# Patient Record
Sex: FEMALE | Race: WHITE | Employment: FULL TIME | URBAN - METROPOLITAN AREA
[De-identification: names, ages, dates, MRNs, and addresses within clinical notes are randomized per-mention and may not be internally consistent; named-entity substitution may affect disease eponyms.]

---

## 2019-08-04 ENCOUNTER — HOSPITAL ENCOUNTER (EMERGENCY)
Age: 34
Discharge: HOME | End: 2019-08-04
Attending: EMERGENCY MEDICINE
Payer: MEDICARE

## 2019-08-04 VITALS
WEIGHT: 200 LBS | TEMPERATURE: 98.3 F | BODY MASS INDEX: 37.76 KG/M2 | DIASTOLIC BLOOD PRESSURE: 92 MMHG | SYSTOLIC BLOOD PRESSURE: 146 MMHG | HEIGHT: 61 IN | HEART RATE: 100 BPM | RESPIRATION RATE: 16 BRPM | OXYGEN SATURATION: 96 %

## 2019-08-04 DIAGNOSIS — S91.309A OPEN WOUND OF FOOT EXCLUDING TOES: ICD-10-CM

## 2019-08-04 DIAGNOSIS — N18.9 CHRONIC KIDNEY DISEASE, UNSPECIFIED CKD STAGE: ICD-10-CM

## 2019-08-04 DIAGNOSIS — R60.9 PERIPHERAL EDEMA: Primary | ICD-10-CM

## 2019-08-04 DIAGNOSIS — R60.9 DEPENDENT EDEMA: ICD-10-CM

## 2019-08-04 LAB
A/G RATIO: 0.9 (ref 1.1–2.2)
ALBUMIN SERPL-MCNC: 3.5 G/DL (ref 3.4–5)
ALP BLD-CCNC: 94 U/L (ref 40–129)
ALT SERPL-CCNC: 34 U/L (ref 10–40)
ANION GAP SERPL CALCULATED.3IONS-SCNC: 11 MMOL/L (ref 3–16)
AST SERPL-CCNC: 38 U/L (ref 15–37)
BASOPHILS ABSOLUTE: 0 K/UL (ref 0–0.2)
BASOPHILS RELATIVE PERCENT: 0.4 %
BILIRUB SERPL-MCNC: <0.2 MG/DL (ref 0–1)
BUN BLDV-MCNC: 34 MG/DL (ref 7–20)
CALCIUM SERPL-MCNC: 8.7 MG/DL (ref 8.3–10.6)
CHLORIDE BLD-SCNC: 105 MMOL/L (ref 99–110)
CO2: 22 MMOL/L (ref 21–32)
CREAT SERPL-MCNC: 1.7 MG/DL (ref 0.6–1.1)
EOSINOPHILS ABSOLUTE: 0.3 K/UL (ref 0–0.6)
EOSINOPHILS RELATIVE PERCENT: 2.8 %
GFR AFRICAN AMERICAN: 41
GFR NON-AFRICAN AMERICAN: 34
GLOBULIN: 4.1 G/DL
GLUCOSE BLD-MCNC: 203 MG/DL (ref 70–99)
HCG QUALITATIVE: NEGATIVE
HCT VFR BLD CALC: 28.2 % (ref 36–48)
HEMOGLOBIN: 9.6 G/DL (ref 12–16)
LACTIC ACID, SEPSIS: 0.7 MMOL/L (ref 0.4–1.9)
LYMPHOCYTES ABSOLUTE: 2.6 K/UL (ref 1–5.1)
LYMPHOCYTES RELATIVE PERCENT: 27.3 %
MCH RBC QN AUTO: 28.8 PG (ref 26–34)
MCHC RBC AUTO-ENTMCNC: 34 G/DL (ref 31–36)
MCV RBC AUTO: 84.7 FL (ref 80–100)
MONOCYTES ABSOLUTE: 0.5 K/UL (ref 0–1.3)
MONOCYTES RELATIVE PERCENT: 5.2 %
NEUTROPHILS ABSOLUTE: 6.1 K/UL (ref 1.7–7.7)
NEUTROPHILS RELATIVE PERCENT: 64.3 %
PDW BLD-RTO: 13.3 % (ref 12.4–15.4)
PLATELET # BLD: 332 K/UL (ref 135–450)
PMV BLD AUTO: 7.1 FL (ref 5–10.5)
POTASSIUM SERPL-SCNC: 4.3 MMOL/L (ref 3.5–5.1)
RBC # BLD: 3.32 M/UL (ref 4–5.2)
SODIUM BLD-SCNC: 138 MMOL/L (ref 136–145)
TOTAL PROTEIN: 7.6 G/DL (ref 6.4–8.2)
WBC # BLD: 9.6 K/UL (ref 4–11)

## 2019-08-04 PROCEDURE — 80053 COMPREHEN METABOLIC PANEL: CPT

## 2019-08-04 PROCEDURE — 85025 COMPLETE CBC W/AUTO DIFF WBC: CPT

## 2019-08-04 PROCEDURE — 99283 EMERGENCY DEPT VISIT LOW MDM: CPT

## 2019-08-04 PROCEDURE — 6370000000 HC RX 637 (ALT 250 FOR IP): Performed by: PHYSICIAN ASSISTANT

## 2019-08-04 PROCEDURE — 84703 CHORIONIC GONADOTROPIN ASSAY: CPT

## 2019-08-04 PROCEDURE — 83605 ASSAY OF LACTIC ACID: CPT

## 2019-08-04 RX ORDER — SULFAMETHOXAZOLE AND TRIMETHOPRIM 800; 160 MG/1; MG/1
2 TABLET ORAL ONCE
Status: COMPLETED | OUTPATIENT
Start: 2019-08-04 | End: 2019-08-04

## 2019-08-04 RX ORDER — GABAPENTIN 300 MG/1
300 CAPSULE ORAL 3 TIMES DAILY PRN
Status: ON HOLD | COMMUNITY

## 2019-08-04 RX ORDER — CIPROFLOXACIN 500 MG/1
750 TABLET, FILM COATED ORAL ONCE
Status: COMPLETED | OUTPATIENT
Start: 2019-08-04 | End: 2019-08-04

## 2019-08-04 RX ORDER — CIPROFLOXACIN 750 MG/1
750 TABLET, FILM COATED ORAL 2 TIMES DAILY
Qty: 20 TABLET | Refills: 0 | Status: SHIPPED | OUTPATIENT
Start: 2019-08-04 | End: 2019-08-14

## 2019-08-04 RX ORDER — AMLODIPINE BESYLATE 10 MG/1
10 TABLET ORAL EVERY EVENING
Status: ON HOLD | COMMUNITY

## 2019-08-04 RX ORDER — SULFAMETHOXAZOLE AND TRIMETHOPRIM 800; 160 MG/1; MG/1
2 TABLET ORAL 2 TIMES DAILY
Qty: 40 TABLET | Refills: 0 | Status: SHIPPED | OUTPATIENT
Start: 2019-08-04 | End: 2019-08-14

## 2019-08-04 RX ORDER — M-VIT,TX,IRON,MINS/CALC/FOLIC 27MG-0.4MG
1 TABLET ORAL DAILY
Status: ON HOLD | COMMUNITY

## 2019-08-04 RX ORDER — CETIRIZINE HYDROCHLORIDE 10 MG/1
10 TABLET ORAL EVERY EVENING
Status: ON HOLD | COMMUNITY

## 2019-08-04 RX ADMIN — SULFAMETHOXAZOLE AND TRIMETHOPRIM 2 TABLET: 800; 160 TABLET ORAL at 21:27

## 2019-08-04 RX ADMIN — CIPROFLOXACIN HYDROCHLORIDE 750 MG: 500 TABLET, FILM COATED ORAL at 21:27

## 2019-08-04 ASSESSMENT — PAIN DESCRIPTION - LOCATION: LOCATION: LEG

## 2019-08-04 ASSESSMENT — PAIN DESCRIPTION - ORIENTATION: ORIENTATION: RIGHT;LEFT

## 2019-08-04 ASSESSMENT — ENCOUNTER SYMPTOMS
ABDOMINAL PAIN: 0
DIARRHEA: 0
SHORTNESS OF BREATH: 0
VOMITING: 0
BLOOD IN STOOL: 0
NAUSEA: 0

## 2019-08-04 ASSESSMENT — PAIN DESCRIPTION - PAIN TYPE: TYPE: ACUTE PAIN

## 2019-08-04 ASSESSMENT — PAIN SCALES - GENERAL: PAINLEVEL_OUTOF10: 6

## 2019-08-05 ENCOUNTER — HOSPITAL ENCOUNTER (OUTPATIENT)
Dept: VASCULAR LAB | Age: 34
Discharge: HOME | End: 2019-08-05
Payer: MEDICARE

## 2019-08-05 DIAGNOSIS — R60.9 PERIPHERAL EDEMA: ICD-10-CM

## 2019-08-05 PROCEDURE — 93970 EXTREMITY STUDY: CPT

## 2019-08-05 NOTE — ED PROVIDER NOTES
Exam reveals no gallop and no friction rub. No murmur heard. 2+ dorsal pedal pulse in bilateral feet. Musculoskeletal: Normal range of motion. She exhibits edema. She exhibits no deformity. Some scabs over the dorsal aspect of the left foot and the lateral aspect of the right ankle. Mild erythema around this but no streaking erythema, fluctuance or drainage noted. No vesicles. No palpable cords. Legs are edematous bilaterally but no pitting edema. Sensation light touch bilateral lower extremity's intact. Neurological: She is alert and oriented to person, place, and time. No cranial nerve deficit. Skin: Skin is warm and dry. No rash noted. She is not diaphoretic. No erythema. Psychiatric: She has a normal mood and affect. Her behavior is normal.   Nursing note and vitals reviewed.                   DIAGNOSTIC RESULTS   LABS:    Labs Reviewed   CBC WITH AUTO DIFFERENTIAL - Abnormal; Notable for the following components:       Result Value    RBC 3.32 (*)     Hemoglobin 9.6 (*)     Hematocrit 28.2 (*)     All other components within normal limits    Narrative:     Performed at:  [facility] [address], [place], [address] [employer]   Phone [phone]   COMPREHENSIVE METABOLIC PANEL - Abnormal; Notable for the following components:    Glucose 203 (*)     BUN 34 (*)     CREATININE 1.7 (*)     GFR Non- 34 (*)     GFR African American 41 (*)     Albumin/Globulin Ratio 0.9 (*)     AST 38 (*)     All other components within normal limits    Narrative:     Performed at:  [facility] [address], [place], [address] [employer]   Phone [phone]   LACTATE, SEPSIS    Narrative:     Performed at:  [facility] [address], [place], [address] [employer]   Phone [phone]   HCG, SERUM, QUALITATIVE    Narrative:     Performed at:  [facility] [address], [place], unremarkable. She denies any chest pain or shortness of breath. Low suspicion for acute congestive heart failure, pulmonary embolus. Nothing to suggest acute arterial occlusion, compartment syndrome, necrotizing fasciitis. Given that this wound did happen while she was staying in the edition she will be covered with Cipro and Bactrim for possible vibrio and bacteria from salt water. These do not need to have any renal adjustment as her creatinine clearance is over 50. She will follow-up with her family physician return here for any worsening of symptoms or problems at home. FINAL IMPRESSION      1. Peripheral edema    2. Open wound of foot excluding toes    3. Chronic kidney disease, unspecified CKD stage    4. Dependent edema          DISPOSITION/PLAN   DISPOSITION Decision To Discharge 08/04/2019 09:15:56 PM      PATIENT REFERREDTO:  No follow-up provider specified.     DISCHARGE MEDICATIONS:  Discharge Medication List as of 8/4/2019  9:35 PM      START taking these medications    Details   ciprofloxacin (CIPRO) 750 MG tablet Take 1 tablet by mouth 2 times daily for 10 days, Disp-20 tablet, R-0Print      sulfamethoxazole-trimethoprim (BACTRIM DS) 800-160 MG per tablet Take 2 tablets by mouth 2 times daily for 10 days, Disp-40 tablet, R-0Print             DISCONTINUED MEDICATIONS:  Discharge Medication List as of 8/4/2019  9:35 PM      STOP taking these medications       lisinopril (PRINIVIL;ZESTRIL) 10 MG tablet Comments:   Reason for Stopping:         insulin detemir (LEVEMIR) 100 UNIT/ML injection vial Comments:   Reason for Stopping:                      (Please note that portions ofthis note were completed with a voice recognition program.  Efforts were made to edit the dictations but occasionally words are mis-transcribed.)    Terrye Scheuermann, PA-C (electronically signed)           Terrye Scheuermann, PA-C  08/05/19 0229

## 2022-10-30 ENCOUNTER — HOSPITAL ENCOUNTER (INPATIENT)
Age: 37
LOS: 1 days | Discharge: LEFT BEFORE DISCHARGE | DRG: 369 | End: 2022-10-31
Attending: EMERGENCY MEDICINE | Admitting: STUDENT IN AN ORGANIZED HEALTH CARE EDUCATION/TRAINING PROGRAM
Payer: COMMERCIAL

## 2022-10-30 ENCOUNTER — APPOINTMENT (OUTPATIENT)
Dept: CT IMAGING | Age: 37
DRG: 369 | End: 2022-10-30
Payer: COMMERCIAL

## 2022-10-30 ENCOUNTER — APPOINTMENT (OUTPATIENT)
Dept: GENERAL RADIOLOGY | Age: 37
DRG: 369 | End: 2022-10-30
Payer: COMMERCIAL

## 2022-10-30 DIAGNOSIS — K92.0 HEMATEMESIS WITH NAUSEA: Primary | ICD-10-CM

## 2022-10-30 PROBLEM — E10.69 TYPE 1 DIABETES MELLITUS WITH OTHER SPECIFIED COMPLICATION (HCC): Status: ACTIVE | Noted: 2022-10-30

## 2022-10-30 PROBLEM — D64.9 NORMOCYTIC ANEMIA: Status: ACTIVE | Noted: 2022-10-30

## 2022-10-30 PROBLEM — N12 PYELONEPHRITIS: Status: ACTIVE | Noted: 2022-10-30

## 2022-10-30 LAB
ABSOLUTE EOS #: <0.03 K/UL (ref 0–0.44)
ABSOLUTE IMMATURE GRANULOCYTE: 0.04 K/UL (ref 0–0.3)
ABSOLUTE LYMPH #: 1.4 K/UL (ref 1.1–3.7)
ABSOLUTE MONO #: 0.22 K/UL (ref 0.1–1.2)
ALBUMIN SERPL-MCNC: 3.9 G/DL (ref 3.5–5.2)
ALBUMIN/GLOBULIN RATIO: 1 (ref 1–2.5)
ALP BLD-CCNC: 80 U/L (ref 35–104)
ALT SERPL-CCNC: 10 U/L (ref 5–33)
ANION GAP SERPL CALCULATED.3IONS-SCNC: 10 MMOL/L (ref 9–17)
AST SERPL-CCNC: 22 U/L
BASOPHILS # BLD: 1 % (ref 0–2)
BASOPHILS ABSOLUTE: 0.05 K/UL (ref 0–0.2)
BILIRUB SERPL-MCNC: 0.5 MG/DL (ref 0.3–1.2)
BUN BLDV-MCNC: 16 MG/DL (ref 6–20)
CALCIUM SERPL-MCNC: 8.9 MG/DL (ref 8.6–10.4)
CHLORIDE BLD-SCNC: 108 MMOL/L (ref 98–107)
CO2: 19 MMOL/L (ref 20–31)
CREAT SERPL-MCNC: 1.45 MG/DL (ref 0.5–0.9)
DIRECT EXAM: POSITIVE
EOSINOPHILS RELATIVE PERCENT: 0 % (ref 1–4)
GFR SERPL CREATININE-BSD FRML MDRD: 47 ML/MIN/1.73M2
GLUCOSE BLD-MCNC: 121 MG/DL (ref 65–105)
GLUCOSE BLD-MCNC: 211 MG/DL (ref 70–99)
GLUCOSE BLD-MCNC: 99 MG/DL (ref 65–105)
HCG QUALITATIVE: NEGATIVE
HCT VFR BLD CALC: 31.7 % (ref 36.3–47.1)
HEMOGLOBIN: 10.5 G/DL (ref 11.9–15.1)
IMMATURE GRANULOCYTES: 0 %
LIPASE: 34 U/L (ref 13–60)
LYMPHOCYTES # BLD: 13 % (ref 24–43)
Lab: ABNORMAL
MAGNESIUM: 1.9 MG/DL (ref 1.6–2.6)
MCH RBC QN AUTO: 27.6 PG (ref 25.2–33.5)
MCHC RBC AUTO-ENTMCNC: 33.1 G/DL (ref 28.4–34.8)
MCV RBC AUTO: 83.2 FL (ref 82.6–102.9)
MONOCYTES # BLD: 2 % (ref 3–12)
NRBC AUTOMATED: 0 PER 100 WBC
PDW BLD-RTO: 13.3 % (ref 11.8–14.4)
PLATELET # BLD: 376 K/UL (ref 138–453)
PMV BLD AUTO: 9.3 FL (ref 8.1–13.5)
POTASSIUM SERPL-SCNC: 4.4 MMOL/L (ref 3.7–5.3)
RBC # BLD: 3.81 M/UL (ref 3.95–5.11)
SEG NEUTROPHILS: 84 % (ref 36–65)
SEGMENTED NEUTROPHILS ABSOLUTE COUNT: 8.9 K/UL (ref 1.5–8.1)
SODIUM BLD-SCNC: 137 MMOL/L (ref 135–144)
SPECIMEN DESCRIPTION: ABNORMAL
TOTAL PROTEIN: 7.8 G/DL (ref 6.4–8.3)
WBC # BLD: 10.6 K/UL (ref 3.5–11.3)

## 2022-10-30 PROCEDURE — 2060000000 HC ICU INTERMEDIATE R&B

## 2022-10-30 PROCEDURE — 84703 CHORIONIC GONADOTROPIN ASSAY: CPT

## 2022-10-30 PROCEDURE — 83690 ASSAY OF LIPASE: CPT

## 2022-10-30 PROCEDURE — 6360000002 HC RX W HCPCS: Performed by: STUDENT IN AN ORGANIZED HEALTH CARE EDUCATION/TRAINING PROGRAM

## 2022-10-30 PROCEDURE — C9113 INJ PANTOPRAZOLE SODIUM, VIA: HCPCS | Performed by: STUDENT IN AN ORGANIZED HEALTH CARE EDUCATION/TRAINING PROGRAM

## 2022-10-30 PROCEDURE — 93005 ELECTROCARDIOGRAM TRACING: CPT

## 2022-10-30 PROCEDURE — 99285 EMERGENCY DEPT VISIT HI MDM: CPT

## 2022-10-30 PROCEDURE — 99223 1ST HOSP IP/OBS HIGH 75: CPT | Performed by: STUDENT IN AN ORGANIZED HEALTH CARE EDUCATION/TRAINING PROGRAM

## 2022-10-30 PROCEDURE — 85025 COMPLETE CBC W/AUTO DIFF WBC: CPT

## 2022-10-30 PROCEDURE — 6360000002 HC RX W HCPCS

## 2022-10-30 PROCEDURE — 96374 THER/PROPH/DIAG INJ IV PUSH: CPT

## 2022-10-30 PROCEDURE — 82947 ASSAY GLUCOSE BLOOD QUANT: CPT

## 2022-10-30 PROCEDURE — 80053 COMPREHEN METABOLIC PANEL: CPT

## 2022-10-30 PROCEDURE — 96375 TX/PRO/DX INJ NEW DRUG ADDON: CPT

## 2022-10-30 PROCEDURE — 36415 COLL VENOUS BLD VENIPUNCTURE: CPT

## 2022-10-30 PROCEDURE — 96376 TX/PRO/DX INJ SAME DRUG ADON: CPT

## 2022-10-30 PROCEDURE — 2580000003 HC RX 258: Performed by: STUDENT IN AN ORGANIZED HEALTH CARE EDUCATION/TRAINING PROGRAM

## 2022-10-30 PROCEDURE — 82271 OCCULT BLOOD OTHER SOURCES: CPT

## 2022-10-30 PROCEDURE — 71045 X-RAY EXAM CHEST 1 VIEW: CPT

## 2022-10-30 PROCEDURE — 87040 BLOOD CULTURE FOR BACTERIA: CPT

## 2022-10-30 PROCEDURE — 2500000003 HC RX 250 WO HCPCS

## 2022-10-30 PROCEDURE — 83735 ASSAY OF MAGNESIUM: CPT

## 2022-10-30 PROCEDURE — 2580000003 HC RX 258

## 2022-10-30 PROCEDURE — 74176 CT ABD & PELVIS W/O CONTRAST: CPT

## 2022-10-30 PROCEDURE — 96372 THER/PROPH/DIAG INJ SC/IM: CPT

## 2022-10-30 RX ORDER — ACETAMINOPHEN 325 MG/1
650 TABLET ORAL EVERY 6 HOURS PRN
Status: DISCONTINUED | OUTPATIENT
Start: 2022-10-30 | End: 2022-10-31 | Stop reason: HOSPADM

## 2022-10-30 RX ORDER — POTASSIUM CHLORIDE 7.45 MG/ML
10 INJECTION INTRAVENOUS PRN
Status: DISCONTINUED | OUTPATIENT
Start: 2022-10-30 | End: 2022-10-31 | Stop reason: HOSPADM

## 2022-10-30 RX ORDER — METOCLOPRAMIDE HYDROCHLORIDE 5 MG/ML
10 INJECTION INTRAMUSCULAR; INTRAVENOUS EVERY 6 HOURS PRN
Status: DISCONTINUED | OUTPATIENT
Start: 2022-10-30 | End: 2022-10-31 | Stop reason: HOSPADM

## 2022-10-30 RX ORDER — METOPROLOL TARTRATE 5 MG/5ML
5 INJECTION INTRAVENOUS EVERY 6 HOURS PRN
Status: DISCONTINUED | OUTPATIENT
Start: 2022-10-30 | End: 2022-10-30

## 2022-10-30 RX ORDER — LORAZEPAM 2 MG/ML
1 INJECTION INTRAMUSCULAR ONCE
Status: COMPLETED | OUTPATIENT
Start: 2022-10-30 | End: 2022-10-30

## 2022-10-30 RX ORDER — POLYETHYLENE GLYCOL 3350 17 G/17G
17 POWDER, FOR SOLUTION ORAL DAILY PRN
Status: DISCONTINUED | OUTPATIENT
Start: 2022-10-30 | End: 2022-10-31 | Stop reason: HOSPADM

## 2022-10-30 RX ORDER — ONDANSETRON 2 MG/ML
4 INJECTION INTRAMUSCULAR; INTRAVENOUS ONCE
Status: COMPLETED | OUTPATIENT
Start: 2022-10-30 | End: 2022-10-30

## 2022-10-30 RX ORDER — ONDANSETRON 2 MG/ML
4 INJECTION INTRAMUSCULAR; INTRAVENOUS EVERY 6 HOURS PRN
Status: DISCONTINUED | OUTPATIENT
Start: 2022-10-30 | End: 2022-10-31 | Stop reason: HOSPADM

## 2022-10-30 RX ORDER — INSULIN LISPRO 100 [IU]/ML
0-4 INJECTION, SOLUTION INTRAVENOUS; SUBCUTANEOUS NIGHTLY
Status: DISCONTINUED | OUTPATIENT
Start: 2022-10-30 | End: 2022-10-31 | Stop reason: HOSPADM

## 2022-10-30 RX ORDER — POTASSIUM CHLORIDE 20 MEQ/1
40 TABLET, EXTENDED RELEASE ORAL PRN
Status: DISCONTINUED | OUTPATIENT
Start: 2022-10-30 | End: 2022-10-31 | Stop reason: HOSPADM

## 2022-10-30 RX ORDER — FENTANYL CITRATE 50 UG/ML
50 INJECTION, SOLUTION INTRAMUSCULAR; INTRAVENOUS ONCE
Status: COMPLETED | OUTPATIENT
Start: 2022-10-30 | End: 2022-10-30

## 2022-10-30 RX ORDER — ONDANSETRON 4 MG/1
4 TABLET, ORALLY DISINTEGRATING ORAL EVERY 8 HOURS PRN
Status: DISCONTINUED | OUTPATIENT
Start: 2022-10-30 | End: 2022-10-31 | Stop reason: HOSPADM

## 2022-10-30 RX ORDER — INSULIN LISPRO 100 [IU]/ML
0-8 INJECTION, SOLUTION INTRAVENOUS; SUBCUTANEOUS
Status: DISCONTINUED | OUTPATIENT
Start: 2022-10-30 | End: 2022-10-31 | Stop reason: HOSPADM

## 2022-10-30 RX ORDER — SODIUM CHLORIDE 0.9 % (FLUSH) 0.9 %
5-40 SYRINGE (ML) INJECTION PRN
Status: DISCONTINUED | OUTPATIENT
Start: 2022-10-30 | End: 2022-10-31 | Stop reason: HOSPADM

## 2022-10-30 RX ORDER — SODIUM CHLORIDE 9 MG/ML
INJECTION, SOLUTION INTRAVENOUS PRN
Status: DISCONTINUED | OUTPATIENT
Start: 2022-10-30 | End: 2022-10-31 | Stop reason: HOSPADM

## 2022-10-30 RX ORDER — PROMETHAZINE HYDROCHLORIDE 25 MG/ML
25 INJECTION, SOLUTION INTRAMUSCULAR; INTRAVENOUS EVERY 6 HOURS PRN
Status: DISCONTINUED | OUTPATIENT
Start: 2022-10-30 | End: 2022-10-31 | Stop reason: HOSPADM

## 2022-10-30 RX ORDER — SODIUM CHLORIDE 9 MG/ML
INJECTION, SOLUTION INTRAVENOUS CONTINUOUS
Status: DISCONTINUED | OUTPATIENT
Start: 2022-10-30 | End: 2022-10-31

## 2022-10-30 RX ORDER — 0.9 % SODIUM CHLORIDE 0.9 %
1000 INTRAVENOUS SOLUTION INTRAVENOUS ONCE
Status: COMPLETED | OUTPATIENT
Start: 2022-10-30 | End: 2022-10-30

## 2022-10-30 RX ORDER — LORAZEPAM 2 MG/ML
1 INJECTION INTRAMUSCULAR EVERY 6 HOURS PRN
Status: DISCONTINUED | OUTPATIENT
Start: 2022-10-30 | End: 2022-10-31 | Stop reason: HOSPADM

## 2022-10-30 RX ORDER — ACETAMINOPHEN 650 MG/1
650 SUPPOSITORY RECTAL EVERY 6 HOURS PRN
Status: DISCONTINUED | OUTPATIENT
Start: 2022-10-30 | End: 2022-10-31 | Stop reason: HOSPADM

## 2022-10-30 RX ORDER — MAGNESIUM SULFATE IN WATER 40 MG/ML
2000 INJECTION, SOLUTION INTRAVENOUS PRN
Status: DISCONTINUED | OUTPATIENT
Start: 2022-10-30 | End: 2022-10-31 | Stop reason: HOSPADM

## 2022-10-30 RX ORDER — DEXTROSE MONOHYDRATE 100 MG/ML
INJECTION, SOLUTION INTRAVENOUS CONTINUOUS PRN
Status: DISCONTINUED | OUTPATIENT
Start: 2022-10-30 | End: 2022-10-31 | Stop reason: HOSPADM

## 2022-10-30 RX ORDER — PROCHLORPERAZINE EDISYLATE 5 MG/ML
10 INJECTION INTRAMUSCULAR; INTRAVENOUS EVERY 6 HOURS PRN
Status: DISCONTINUED | OUTPATIENT
Start: 2022-10-30 | End: 2022-10-31 | Stop reason: HOSPADM

## 2022-10-30 RX ORDER — LABETALOL HYDROCHLORIDE 5 MG/ML
10 INJECTION, SOLUTION INTRAVENOUS EVERY 4 HOURS PRN
Status: DISCONTINUED | OUTPATIENT
Start: 2022-10-30 | End: 2022-10-31 | Stop reason: HOSPADM

## 2022-10-30 RX ORDER — PROMETHAZINE HYDROCHLORIDE 25 MG/ML
25 INJECTION, SOLUTION INTRAMUSCULAR; INTRAVENOUS ONCE
Status: COMPLETED | OUTPATIENT
Start: 2022-10-30 | End: 2022-10-30

## 2022-10-30 RX ORDER — SODIUM CHLORIDE 0.9 % (FLUSH) 0.9 %
5-40 SYRINGE (ML) INJECTION EVERY 12 HOURS SCHEDULED
Status: DISCONTINUED | OUTPATIENT
Start: 2022-10-30 | End: 2022-10-31 | Stop reason: HOSPADM

## 2022-10-30 RX ADMIN — SODIUM CHLORIDE 80 MG: 9 INJECTION, SOLUTION INTRAVENOUS at 15:23

## 2022-10-30 RX ADMIN — METOCLOPRAMIDE 10 MG: 5 INJECTION, SOLUTION INTRAMUSCULAR; INTRAVENOUS at 20:55

## 2022-10-30 RX ADMIN — SODIUM CHLORIDE: 9 INJECTION, SOLUTION INTRAVENOUS at 16:59

## 2022-10-30 RX ADMIN — SODIUM CHLORIDE, PRESERVATIVE FREE 40 MG: 5 INJECTION INTRAVENOUS at 20:55

## 2022-10-30 RX ADMIN — CEFTRIAXONE SODIUM 1000 MG: 10 INJECTION, POWDER, FOR SOLUTION INTRAVENOUS at 21:24

## 2022-10-30 RX ADMIN — ONDANSETRON 4 MG: 2 INJECTION INTRAMUSCULAR; INTRAVENOUS at 20:10

## 2022-10-30 RX ADMIN — PROMETHAZINE HYDROCHLORIDE 25 MG: 25 INJECTION INTRAMUSCULAR; INTRAVENOUS at 14:17

## 2022-10-30 RX ADMIN — FENTANYL CITRATE 50 MCG: 50 INJECTION, SOLUTION INTRAMUSCULAR; INTRAVENOUS at 14:21

## 2022-10-30 RX ADMIN — ONDANSETRON 4 MG: 2 INJECTION INTRAMUSCULAR; INTRAVENOUS at 13:19

## 2022-10-30 RX ADMIN — SODIUM CHLORIDE 1000 ML: 9 INJECTION, SOLUTION INTRAVENOUS at 13:19

## 2022-10-30 RX ADMIN — LORAZEPAM 1 MG: 2 INJECTION INTRAMUSCULAR; INTRAVENOUS at 17:24

## 2022-10-30 RX ADMIN — ONDANSETRON 4 MG: 2 INJECTION INTRAMUSCULAR; INTRAVENOUS at 14:47

## 2022-10-30 ASSESSMENT — ENCOUNTER SYMPTOMS
EYE REDNESS: 0
EYES NEGATIVE: 1
CHOKING: 0
SHORTNESS OF BREATH: 0
CHEST TIGHTNESS: 0
RHINORRHEA: 0
VOMITING: 1
ALLERGIC/IMMUNOLOGIC NEGATIVE: 1
COUGH: 0
DIARRHEA: 1
NAUSEA: 1
ABDOMINAL PAIN: 1

## 2022-10-30 ASSESSMENT — PAIN - FUNCTIONAL ASSESSMENT: PAIN_FUNCTIONAL_ASSESSMENT: 0-10

## 2022-10-30 ASSESSMENT — PAIN DESCRIPTION - LOCATION
LOCATION: FLANK;ABDOMEN
LOCATION: ABDOMEN;FLANK

## 2022-10-30 ASSESSMENT — PAIN SCALES - GENERAL
PAINLEVEL_OUTOF10: 10

## 2022-10-30 ASSESSMENT — PAIN DESCRIPTION - ORIENTATION
ORIENTATION: RIGHT;LEFT
ORIENTATION: RIGHT;LEFT

## 2022-10-30 NOTE — PLAN OF CARE
Problem: Discharge Planning  Goal: Discharge to home or other facility with appropriate resources  Outcome: Progressing  Flowsheets (Taken 10/30/2022 1700)  Discharge to home or other facility with appropriate resources:   Identify barriers to discharge with patient and caregiver   Arrange for needed discharge resources and transportation as appropriate   Identify discharge learning needs (meds, wound care, etc)   Refer to discharge planning if patient needs post-hospital services based on physician order or complex needs related to functional status, cognitive ability or social support system     Problem: Pain  Goal: Verbalizes/displays adequate comfort level or baseline comfort level  Outcome: Progressing     Problem: Safety - Adult  Goal: Free from fall injury  Outcome: Progressing

## 2022-10-30 NOTE — ED PROVIDER NOTES
[facility]     Emergency Department     Faculty Attestation    I performed a history and physical examination of the patient and discussed management with the resident. I have reviewed and agree with the residents findings including all diagnostic interpretations, and treatment plans as written at the time of my review. Any areas of disagreement are noted on the chart. I was personally present for the key portions of any procedures. I have documented in the chart those procedures where I was not present during the key portions. For Physician Assistant/ Nurse Practitioner cases/documentation I have personally evaluated this patient and have completed at least one if not all key elements of the E/M (history, physical exam, and MDM). Additional findings are as noted. Primary Care Physician: No primary care provider on file. History: This is a 44 y.o. female who presents to the Emergency Department with complaint of nausea vomiting diarrhea. Began today. Patient states she has history of stage III chronic kidney disease    Physical:   height is 5' 1\" (1.549 m) and weight is 200 lb (90.7 kg). Her blood pressure is 150/133 (abnormal) and her pulse is 88. Her respiration is 24 and oxygen saturation is 100%. Patient is vomiting and having diarrhea in the emergency department, heart regular rate, patient has no significant respiratory distress noted. Impression: Nausea vomiting diarrhea    Plan: IV fluid, antiemetic, CBC, CMP, lipase, urinalysis, hCG and reassess    Based on my initial history, physical exam and review of the past medical history, my initial differential diagnosis includes the following: Electrlyte abnormalities, GI Bleed, UTI    During the Emergency Department course the patient had the following responses to our interventions: Minimal improvement on vomiting and diarrhea.      Review of radiological and laboratory results (and input from consultants) now identifies the following problems listed here: elevated glucose, elevated creatinine, baseline anemia, wit positive gastric cult blood in emesis    After reviewing the Emergency Department evaluation, clinical response and results of diagnostic studies, I believe the most probable diagnosis/es is/are: GI Bleed  In the previous differential diagnosis possibilities are now highly unlikely, including pancreatitis , for the following reasons: normal lipase    After reviewing the diagnosis and (risk, benefits and alternatives) treatment options with the patient, we agreed the following plan: Admission      (Please note that portions of this note were completed with a voice recognition program.  Efforts were made to edit the dictations but occasionally words are mis-transcribed.)    Bello Smith.  Agustin Saenz MD, [address] [facility]  Attending Emergency Medicine Physician         Katherine Baker MD  10/30/22 [address] [facility]. Agustin Saenz MD  10/30/22 0653

## 2022-10-30 NOTE — ED PROVIDER NOTES
[facility]  ED  Emergency Department Encounter  Emergency Medicine Resident     Pt Name: Kathia Delgado  MRN: [id number]  [place] 1985  Date of evaluation: 10/30/22  PCP:  No primary care provider on file. CHIEF COMPLAINT       Chief Complaint   Patient presents with    Emesis     Dark brown in color, constant since this AM    Diarrhea     HISTORY OFPRESENT ILLNESS  (Location/Symptom, Timing/Onset, Context/Setting, Quality, Duration, Modifying Factors,Severity.)      Kathia Delgado is a 44 y.o. female who presents with persistent nausea, vomiting and diarrhea since this morning. Also endorsing abdominal pain. Patient is a stage III kidney patient, not on dialysis being followed in [place]. Patient is concerned as she has had bloody emesis in the past.     Patient denies any fevers, headaches, vision changes, chest pain, shortness of breath, cough, congestion, runny nose, and dysuria. PAST MEDICAL / SURGICAL / SOCIAL / FAMILY HISTORY      has a past medical history of Diabetes mellitus ([facility] [address] and Hypertension. has a past surgical history that includes Tubal ligation (11/2018).     Social History     Socioeconomic History    Marital status: Single     Spouse name: Not on file    Number of children: Not on file    Years of education: Not on file    Highest education level: Not on file   Occupational History    Not on file   Tobacco Use    Smoking status: Never    Smokeless tobacco: Never   Substance and Sexual Activity    Alcohol use: No    Drug use: Never    Sexual activity: Not on file   Other Topics Concern    Not on file   Social History Narrative    Not on file     Social Determinants of Health     Financial Resource Strain: Not on file   Food Insecurity: Not on file   Transportation Needs: Not on file   Physical Activity: Not on file   Stress: Not on file   Social Connections: Not on file   Intimate Partner Violence: Not on file   Housing Stability: Not on file     History reviewed. No pertinent family history. Allergies:  Cinnamon and Morphine    Home Medications:  Prior to Admission medications    Medication Sig Start Date End Date Taking? Authorizing Provider   LOSARTAN POTASSIUM PO Take by mouth    Historical Provider, MD   amLODIPine (NORVASC) 10 MG tablet Take 10 mg by mouth every evening    Edward Provider, MD   gabapentin (NEURONTIN) 300 MG capsule Take 300 mg by mouth 3 times daily. Historical Provider, MD   ATORVASTATIN CALCIUM PO Take by mouth every evening    Edward Provider, MD   cetirizine (ZYRTEC) 10 MG tablet Take 10 mg by mouth every evening    Historical Provider, MD   vitamin D (CHOLECALCIFEROL) 1000 UNIT TABS tablet Take 1,000 Units by mouth once a week Indications: on sundays    Historical Provider, MD   Multiple Vitamins-Minerals (THERAPEUTIC MULTIVITAMIN-MINERALS) tablet Take 1 tablet by mouth daily    Historical Provider, MD   ondansetron (ZOFRAN ODT) 4 MG disintegrating tablet Take 1 tablet by mouth every 8 hours as needed for Nausea or Vomiting 1/26/17   Niesha Navarro MD   AMITRIPTYLINE HCL PO Take by mouth    Historical Provider, MD   Insulin Infusion Pump BERTA by Does not apply route    Historical Provider, MD   insulin aspart (NOVOLOG) 100 UNIT/ML injection vial Inject into the skin 3 times daily (before meals) Sliding scale    Historical Provider, MD       REVIEW OF SYSTEMS    (2-9 systems for level 4, 10 or more for level 5)      Review of Systems   Constitutional:  Negative for fever. HENT:  Negative for congestion and rhinorrhea. Eyes:  Negative for redness. Respiratory:  Negative for shortness of breath. Cardiovascular:  Negative for chest pain. Gastrointestinal:  Positive for abdominal pain, diarrhea, nausea and vomiting. Genitourinary:  Negative for dysuria. Musculoskeletal:  Negative for joint swelling. Skin:  Negative for wound. Neurological:  Negative for headaches.      PHYSICAL EXAM   (up to 7 for level 4, 8 or more for level 5)     INITIAL VITALS:    height is 5' 1\" (1.549 m) and weight is 200 lb (90.7 kg). Her axillary temperature is 97.5 °F (36.4 °C). Her blood pressure is 150/133 (abnormal) and her pulse is 88. Her respiration is 24 and oxygen saturation is 100%. Physical Exam  Constitutional:       General: She is in acute distress. Appearance: She is ill-appearing. She is not diaphoretic. HENT:      Head: Normocephalic. Mouth/Throat:      Mouth: Mucous membranes are dry. Eyes:      Extraocular Movements: Extraocular movements intact. Pupils: Pupils are equal, round, and reactive to light. Cardiovascular:      Rate and Rhythm: Normal rate and regular rhythm. Pulses: Normal pulses. Heart sounds: Normal heart sounds. No murmur heard. Pulmonary:      Effort: Pulmonary effort is normal. No respiratory distress. Breath sounds: Normal breath sounds. No wheezing. Abdominal:      General: There is no distension. Palpations: Abdomen is soft. Tenderness: There is abdominal tenderness (diffuse/generalized). There is no guarding or rebound. Musculoskeletal:         General: Normal range of motion. Cervical back: Normal range of motion. No tenderness. Right lower leg: No edema. Left lower leg: No edema. Lymphadenopathy:      Cervical: No cervical adenopathy. Skin:     General: Skin is warm. Neurological:      Mental Status: She is oriented to person, place, and time. Cranial Nerves: No cranial nerve deficit. Motor: No weakness.      DIFFERENTIAL  DIAGNOSIS     PLAN (LABS / IMAGING / EKG):  Orders Placed This Encounter   Procedures    XR CHEST PORTABLE    CBC with Auto Differential    CMP    Lipase    Magnesium    Urinalysis with Reflex to Culture    OCCULT BLOOD SCREEN    HCG Qualitative, Serum    OCCULT BLOOD GASTRIC / DUODENUM    Occult blood gastric / duodenum    Telemetry monitoring - 72 hour duration    Inpatient consult to GI    Inpatient consult to Internal Medicine    ADMIT TO INPATIENT    ADMIT TO INPATIENT     MEDICATIONS ORDERED:  Orders Placed This Encounter   Medications    ondansetron (ZOFRAN) injection 4 mg    0.9 % sodium chloride bolus    promethazine (PHENERGAN) injection 25 mg    fentaNYL (SUBLIMAZE) injection 50 mcg    pantoprazole (PROTONIX) 80 mg in sodium chloride 0.9 % 50 mL bolus    DISCONTD: pantoprazole (PROTONIX) 80 mg in sodium chloride 0.9 % 100 mL infusion    ondansetron (ZOFRAN) injection 4 mg    0.9 % sodium chloride infusion     DDX: GI bleed, gastroenteritis    Initial MDM/Plan: 44 y.o. female who presents with vomiting, diarrhea since this morning. Concerned for blood in vomit due to brown discoloration. Will check for occult blood in vomit and stool. Also check labs for electrolyte abnormality, blood loss anemia. Give IVF, zofran and reassess. DIAGNOSTIC RESULTS / EMERGENCY DEPARTMENT COURSE / MDM     LABS:  Labs Reviewed   CBC WITH AUTO DIFFERENTIAL - Abnormal; Notable for the following components:       Result Value    RBC 3.81 (*)     Hemoglobin 10.5 (*)     Hematocrit 31.7 (*)     Seg Neutrophils 84 (*)     Lymphocytes 13 (*)     Monocytes 2 (*)     Eosinophils % 0 (*)     Segs Absolute 8.90 (*)     All other components within normal limits   COMPREHENSIVE METABOLIC PANEL - Abnormal; Notable for the following components:    Glucose 211 (*)     Creatinine 1.45 (*)     Est, Glom Filt Rate 47 (*)     Chloride 108 (*)     CO2 19 (*)     All other components within normal limits   OCCULT BLOOD GASTRIC / DUODENUM - Abnormal; Notable for the following components:    Direct Exam POSITIVE (*)     All other components within normal limits   LIPASE   MAGNESIUM   HCG, SERUM, QUALITATIVE   URINALYSIS WITH REFLEX TO CULTURE   OCCULT BLOOD SCREEN   OCCULT BLOOD GASTRIC / DUODENUM       RADIOLOGY:  XR CHEST PORTABLE    Result Date: 10/30/2022  EXAMINATION: ONE XRAY VIEW OF THE CHEST 10/30/2022 2:51 pm COMPARISON: None. HISTORY: ORDERING SYSTEM PROVIDED HISTORY: vomiting blood TECHNOLOGIST PROVIDED HISTORY: vomiting blood FINDINGS: The cardiac silhouette appears within normal limits. No confluent airspace opacity or pleural effusion is seen. No evidence of intra-free air is seen beneath the diaphragm. No evidence of acute pulmonary abnormality seen. No radiographic evidence of intra-free air is seen beneath the diaphragm. EKG  EKG obtained in triage, prior to physician evaluation and orders. EKG did not function in decision making. R[place]y Coma Scale  Eye Opening: Spontaneous  Best Verbal Response: Oriented  Best Motor Response: Obeys commands  [place]y Coma Scale Score: 15    EMERGENCY DEPARTMENT COURSE:  ED Course as of 10/30/22 1638   Sun Oct 30, 2022   1418 Creatinine(!): 1.45  Previously  2.04 on 7/22/22 [CP]   1418 hCG Qual: NEGATIVE  Hcg negative [CP]   1419 Magnesium: 1.9  Mg normal [CP]   0891 Spoke with Carine from GI. Hematemesis is likely secondary to diabetes. Recommended PPO and check hemoglobin A1c [CP]   1456 Spoke with IM team, who will admit the patient [CP]   [phone] with Carli's mom Jaylen with permission ([phone]). 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 \"  [CP]      ED Course User Index  [CP] Cesar Gonzalez MD     PROCEDURES:  None    CONSULTS:  IP CONSULT TO GI  IP CONSULT TO INTERNAL MEDICINE    CRITICAL CARE:  There was significant risk of life threatening deterioration of patient's condition requiring my direct management. Critical care time 5 minutes, excluding any documented procedures. FINAL IMPRESSION      1. Hematemesis with nausea        DISPOSITION / PLAN     DISPOSITION Admitted 10/30/2022 04:00:21 PM    PATIENTREFERRED TO:  No follow-up provider specified.     DISCHARGE MEDICATIONS:  New Prescriptions    No medications on file       Libia Orozco MD  Emergency Medicine Resident    (Please note that portions of this note were completed with a voice recognition program.  Efforts were made to edit the dictations but occasionally words are mis-transcribed.)        Anika Campo MD  Resident  10/30/22 4175       Anika Campo MD  Resident  10/30/22 0318

## 2022-10-30 NOTE — ED NOTES
Writer called Pharmacy regarding Phenergan not being sent. Pharmacy stated they would send medication to ED.       Juanjose Benietz RN  10/30/22 4373

## 2022-10-30 NOTE — ED NOTES
Pt arrived to ED alert and oriented x4 via EMS. Pt c/o abdominal and flank pain with n/v/d. Pt reports symptoms began this AM, states she has been having diarrhea and vomiting \"all day\". Pt c/o diffuse abdominal pain, actively vomiting on arrival.  Pt states \"my kidneys hurt\", has a hx of kidney failure and is on a list for a kidney transplant. Pt denies having been around anyone suspected to have COVID-19 or anyone that has been sick, denies recent travel outside the [facility] or [address]. Pt placed on continuous pulse ox and BP cuff. RR rapid and labored. Will continue plan of care.      Love Johnson RN  10/30/22 6216

## 2022-10-30 NOTE — PROGRESS NOTES
Patient given ativan x1 with good relief of nausea. Patient down for CT abdomen and pelvis. Writer also notifies residents for orders for blood sugars and also that patient's BP remains elevated. New orders pending.

## 2022-10-30 NOTE — ED NOTES
Writer Perfect Served admitting service regarding orders and pt still being in pain and vomiting. Waiting on response.       Erin Narvaez RN  10/30/22 5244

## 2022-10-30 NOTE — ED NOTES
Labeled blood specimens sent to lab via tube system.     [x] Lavender   [] on ice   [x] Blue   [x] Green/yellow  [x] Green/black [] on ice  [] Pink  [x] Red  [] Yellow  [] Blood Cultures      Amina Yoder RN  10/30/22 1885

## 2022-10-31 VITALS
TEMPERATURE: 99.4 F | HEART RATE: 95 BPM | BODY MASS INDEX: 37.76 KG/M2 | WEIGHT: 200 LBS | RESPIRATION RATE: 18 BRPM | OXYGEN SATURATION: 100 % | HEIGHT: 61 IN | DIASTOLIC BLOOD PRESSURE: 98 MMHG | SYSTOLIC BLOOD PRESSURE: 168 MMHG

## 2022-10-31 PROBLEM — R80.9 PROTEINURIA DUE TO TYPE 1 DIABETES MELLITUS (HCC): Status: ACTIVE | Noted: 2022-10-31

## 2022-10-31 PROBLEM — D63.8 ANEMIA OF CHRONIC DISEASE: Status: ACTIVE | Noted: 2022-10-31

## 2022-10-31 PROBLEM — F41.9 ANXIETY: Status: ACTIVE | Noted: 2022-10-31

## 2022-10-31 PROBLEM — E10.22 TYPE 1 DIABETES MELLITUS WITH STAGE 3A CHRONIC KIDNEY DISEASE (HCC): Status: ACTIVE | Noted: 2022-10-30

## 2022-10-31 PROBLEM — Z96.41 INSULIN PUMP IN PLACE: Status: ACTIVE | Noted: 2022-10-31

## 2022-10-31 PROBLEM — N18.31 TYPE 1 DIABETES MELLITUS WITH STAGE 3A CHRONIC KIDNEY DISEASE (HCC): Status: ACTIVE | Noted: 2022-10-30

## 2022-10-31 PROBLEM — N17.9 AKI (ACUTE KIDNEY INJURY) (HCC): Status: ACTIVE | Noted: 2022-10-31

## 2022-10-31 PROBLEM — E10.29 PROTEINURIA DUE TO TYPE 1 DIABETES MELLITUS (HCC): Status: ACTIVE | Noted: 2022-10-31

## 2022-10-31 LAB
ABSOLUTE EOS #: <0.03 K/UL (ref 0–0.44)
ABSOLUTE IMMATURE GRANULOCYTE: 0.04 K/UL (ref 0–0.3)
ABSOLUTE LYMPH #: 2.02 K/UL (ref 1.1–3.7)
ABSOLUTE MONO #: 0.72 K/UL (ref 0.1–1.2)
ANION GAP SERPL CALCULATED.3IONS-SCNC: 15 MMOL/L (ref 9–17)
BASOPHILS # BLD: 0 % (ref 0–2)
BASOPHILS ABSOLUTE: 0.04 K/UL (ref 0–0.2)
BETA-HYDROXYBUTYRATE: 1.64 MMOL/L (ref 0.02–0.27)
BILIRUBIN URINE: NEGATIVE
BUN BLDV-MCNC: 19 MG/DL (ref 6–20)
CALCIUM SERPL-MCNC: 8.1 MG/DL (ref 8.6–10.4)
CASTS UA: NORMAL /LPF (ref 0–2)
CHLORIDE BLD-SCNC: 105 MMOL/L (ref 98–107)
CO2: 18 MMOL/L (ref 20–31)
COLOR: YELLOW
CREAT SERPL-MCNC: 1.75 MG/DL (ref 0.5–0.9)
CREATININE URINE: 105.9 MG/DL (ref 28–217)
EKG ATRIAL RATE: 79 BPM
EKG P AXIS: 58 DEGREES
EKG P-R INTERVAL: 130 MS
EKG Q-T INTERVAL: 396 MS
EKG QRS DURATION: 76 MS
EKG QTC CALCULATION (BAZETT): 454 MS
EKG R AXIS: 17 DEGREES
EKG T AXIS: 19 DEGREES
EKG VENTRICULAR RATE: 79 BPM
EOSINOPHILS RELATIVE PERCENT: 0 % (ref 1–4)
EPITHELIAL CELLS UA: NORMAL /HPF (ref 0–5)
ESTIMATED AVERAGE GLUCOSE: 128 MG/DL
GFR SERPL CREATININE-BSD FRML MDRD: 38 ML/MIN/1.73M2
GLUCOSE BLD-MCNC: 115 MG/DL (ref 65–105)
GLUCOSE BLD-MCNC: 192 MG/DL (ref 70–99)
GLUCOSE URINE: NEGATIVE
HBA1C MFR BLD: 6.1 % (ref 4–6)
HCT VFR BLD CALC: 28.9 % (ref 36.3–47.1)
HEMOGLOBIN: 9.4 G/DL (ref 11.9–15.1)
IMMATURE GRANULOCYTES: 0 %
KETONES, URINE: ABNORMAL
LACTIC ACID, SEPSIS WHOLE BLOOD: 0.7 MMOL/L (ref 0.5–1.9)
LEUKOCYTE ESTERASE, URINE: NEGATIVE
LYMPHOCYTES # BLD: 17 % (ref 24–43)
MCH RBC QN AUTO: 27.9 PG (ref 25.2–33.5)
MCHC RBC AUTO-ENTMCNC: 32.5 G/DL (ref 28.4–34.8)
MCV RBC AUTO: 85.8 FL (ref 82.6–102.9)
MONOCYTES # BLD: 6 % (ref 3–12)
NITRITE, URINE: NEGATIVE
NRBC AUTOMATED: 0 PER 100 WBC
PDW BLD-RTO: 13.5 % (ref 11.8–14.4)
PH UA: 5.5 (ref 5–8)
PLATELET # BLD: 305 K/UL (ref 138–453)
PMV BLD AUTO: 9.6 FL (ref 8.1–13.5)
POTASSIUM SERPL-SCNC: 3.9 MMOL/L (ref 3.7–5.3)
PROTEIN UA: ABNORMAL
RBC # BLD: 3.37 M/UL (ref 3.95–5.11)
RBC UA: NORMAL /HPF (ref 0–2)
SEG NEUTROPHILS: 77 % (ref 36–65)
SEGMENTED NEUTROPHILS ABSOLUTE COUNT: 9.17 K/UL (ref 1.5–8.1)
SODIUM BLD-SCNC: 138 MMOL/L (ref 135–144)
SPECIFIC GRAVITY UA: 1.02 (ref 1–1.03)
TOTAL PROTEIN, URINE: 226 MG/DL
TURBIDITY: CLEAR
URINE HGB: ABNORMAL
URINE TOTAL PROTEIN CREATININE RATIO: 2.13 (ref 0–0.2)
UROBILINOGEN, URINE: NORMAL
WBC # BLD: 12 K/UL (ref 3.5–11.3)
WBC UA: NORMAL /HPF (ref 0–5)

## 2022-10-31 PROCEDURE — 36415 COLL VENOUS BLD VENIPUNCTURE: CPT

## 2022-10-31 PROCEDURE — 81001 URINALYSIS AUTO W/SCOPE: CPT

## 2022-10-31 PROCEDURE — 6360000002 HC RX W HCPCS: Performed by: STUDENT IN AN ORGANIZED HEALTH CARE EDUCATION/TRAINING PROGRAM

## 2022-10-31 PROCEDURE — 84156 ASSAY OF PROTEIN URINE: CPT

## 2022-10-31 PROCEDURE — 2580000003 HC RX 258: Performed by: STUDENT IN AN ORGANIZED HEALTH CARE EDUCATION/TRAINING PROGRAM

## 2022-10-31 PROCEDURE — 83605 ASSAY OF LACTIC ACID: CPT

## 2022-10-31 PROCEDURE — 6360000002 HC RX W HCPCS

## 2022-10-31 PROCEDURE — 93010 ELECTROCARDIOGRAM REPORT: CPT | Performed by: INTERNAL MEDICINE

## 2022-10-31 PROCEDURE — 82570 ASSAY OF URINE CREATININE: CPT

## 2022-10-31 PROCEDURE — 83036 HEMOGLOBIN GLYCOSYLATED A1C: CPT

## 2022-10-31 PROCEDURE — 85025 COMPLETE CBC W/AUTO DIFF WBC: CPT

## 2022-10-31 PROCEDURE — 2580000003 HC RX 258

## 2022-10-31 PROCEDURE — 82010 KETONE BODYS QUAN: CPT

## 2022-10-31 PROCEDURE — 6370000000 HC RX 637 (ALT 250 FOR IP)

## 2022-10-31 PROCEDURE — 80048 BASIC METABOLIC PNL TOTAL CA: CPT

## 2022-10-31 PROCEDURE — 82947 ASSAY GLUCOSE BLOOD QUANT: CPT

## 2022-10-31 PROCEDURE — 99233 SBSQ HOSP IP/OBS HIGH 50: CPT | Performed by: INTERNAL MEDICINE

## 2022-10-31 PROCEDURE — C9113 INJ PANTOPRAZOLE SODIUM, VIA: HCPCS | Performed by: STUDENT IN AN ORGANIZED HEALTH CARE EDUCATION/TRAINING PROGRAM

## 2022-10-31 PROCEDURE — 99222 1ST HOSP IP/OBS MODERATE 55: CPT | Performed by: INTERNAL MEDICINE

## 2022-10-31 RX ORDER — 0.9 % SODIUM CHLORIDE 0.9 %
1000 INTRAVENOUS SOLUTION INTRAVENOUS ONCE
Status: COMPLETED | OUTPATIENT
Start: 2022-10-31 | End: 2022-10-31

## 2022-10-31 RX ORDER — SODIUM CHLORIDE 9 MG/ML
INJECTION, SOLUTION INTRAVENOUS CONTINUOUS
Status: DISCONTINUED | OUTPATIENT
Start: 2022-10-31 | End: 2022-10-31 | Stop reason: HOSPADM

## 2022-10-31 RX ADMIN — ONDANSETRON 4 MG: 4 TABLET, ORALLY DISINTEGRATING ORAL at 08:31

## 2022-10-31 RX ADMIN — METOCLOPRAMIDE 10 MG: 5 INJECTION, SOLUTION INTRAMUSCULAR; INTRAVENOUS at 12:27

## 2022-10-31 RX ADMIN — SODIUM CHLORIDE, PRESERVATIVE FREE 40 MG: 5 INJECTION INTRAVENOUS at 08:18

## 2022-10-31 RX ADMIN — LORAZEPAM 1 MG: 2 INJECTION INTRAMUSCULAR; INTRAVENOUS at 00:30

## 2022-10-31 RX ADMIN — SODIUM CHLORIDE: 9 INJECTION, SOLUTION INTRAVENOUS at 15:01

## 2022-10-31 RX ADMIN — SODIUM CHLORIDE 1000 ML: 9 INJECTION, SOLUTION INTRAVENOUS at 11:41

## 2022-10-31 RX ADMIN — PROCHLORPERAZINE EDISYLATE 10 MG: 5 INJECTION INTRAMUSCULAR; INTRAVENOUS at 12:35

## 2022-10-31 ASSESSMENT — ENCOUNTER SYMPTOMS
CHEST TIGHTNESS: 0
EYES NEGATIVE: 1
ABDOMINAL PAIN: 1
VOMITING: 0
ALLERGIC/IMMUNOLOGIC NEGATIVE: 1
CONSTIPATION: 0
BLOOD IN STOOL: 0
SHORTNESS OF BREATH: 0
NAUSEA: 0
COUGH: 0
DIARRHEA: 0

## 2022-10-31 ASSESSMENT — PAIN SCALES - GENERAL: PAINLEVEL_OUTOF10: 0

## 2022-10-31 NOTE — CONSULTS
[place] [facility]    GASTROENTEROLOGY CONSULT    Patient:   Laurel Adams   :    1985   Facility:   [facility]   Date:    10/31/2022  Admission Dx:  Hematemesis [K92.0]  Hematemesis with nausea [K92.0]  Requesting physician: Iman Carlin MD  Reason for consult:  Hematemesis   CC : N/V    SUBJECTIVE     HISTORY OF PRESENT ILLNESS  This is a 44 y.o.   female who was admitted 10/30/2022 with Hematemesis [K92.0]  Hematemesis with nausea [K92.0]. We have been asked to see the patient in consultation by Iman Carlin MD for hematemesis. 65-year-old female with a PMH of T1 DM on insulin pump and hypertension who presented to the ED for evaluation of nausea, vomiting, diarrhea and hematemesis. GI consulted for hematemesis. Patient reports she was doing well and then yesterday morning developed nausea followed by several episodes of nonbloody emesis (approximately 30 episodes). Later she noted an episode of coffee-ground emesis prompting her admission. While in the ED, patient had an episode of coffee-ground emesis which was occult positive. Admitting hemoglobin was 10.5 g/dL. Patient reports associated diffuse abdominal pain (musculoskeletal) from vomiting. Patient denies any further episodes of hematemesis. Last episode of vomiting was 10 PM last night which was nonbloody. Patient denies any prior episodes of hematemesis. Patient reports she had 1 episode of diarrhea as well yesterday none since. CT abdomen and pelvis concerning for pyelonephritis or renal infarct. 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 No longstanding history of heartburn or reflux but does admit to early satiety. No dysphagia or odynophagia. Per care everywhere, her HbA1c was 7.6% in 2022. No prior EGD.       Previous GI history:     No prior EGD or colonoscopy    OBJECTIVE:     PAST MEDICAL/SURGICAL HISTORY  Past Medical History:   Diagnosis Date    Diabetes mellitus ([facility] [address]     type 1    Hypertension      Past Surgical History:   Procedure Laterality Date    TUBAL LIGATION  11/2018       ALLERGIES:  Allergies   Allergen Reactions    Cinnamon Hives    Morphine Other (See Comments)     Hallucinations         HOME MEDICATIONS:  Prior to Admission medications    Medication Sig Start Date End Date Taking? Authorizing Provider   LOSARTAN POTASSIUM PO Take by mouth    Historical Provider, MD   amLODIPine (NORVASC) 10 MG tablet Take 10 mg by mouth every evening    Historical Provider, MD   gabapentin (NEURONTIN) 300 MG capsule Take 300 mg by mouth 3 times daily.   Patient not taking: Reported on 10/30/2022    Historical Provider, MD   ATORVASTATIN CALCIUM PO Take by mouth every evening    Historical Provider, MD   cetirizine (ZYRTEC) 10 MG tablet Take 10 mg by mouth every evening    Historical Provider, MD   vitamin D (CHOLECALCIFEROL) 1000 UNIT TABS tablet Take 1,000 Units by mouth once a week Indications: on sundays  Patient not taking: Reported on 10/30/2022    Historical Provider, MD   Multiple Vitamins-Minerals (THERAPEUTIC MULTIVITAMIN-MINERALS) tablet Take 1 tablet by mouth daily  Patient not taking: Reported on 10/30/2022    Historical Provider, MD   ondansetron (ZOFRAN ODT) 4 MG disintegrating tablet Take 1 tablet by mouth every 8 hours as needed for Nausea or Vomiting  Patient not taking: Reported on 10/30/2022 1/26/17   Carlos Davidson MD   AMITRIPTYLINE HCL PO Take by mouth    Historical Provider, MD   Insulin Infusion Pump BERTA by Does not apply route    Historical Provider, MD   insulin aspart (NOVOLOG) 100 UNIT/ML injection vial Inject into the skin 3 times daily (before meals) Sliding scale    Historical Provider, MD       CURRENT MEDICATIONS:  Scheduled Meds:   sodium chloride flush  5-40 mL IntraVENous 2 times per day    pantoprazole (PROTONIX) 40 mg injection  40 mg IntraVENous Q12H    insulin lispro  0-8 Units SubCUTAneous TID WC    insulin lispro  0-4 Units SubCUTAneous Nightly    cefTRIAXone (ROCEPHIN) IV  1,000 mg IntraVENous Q24H     Continuous Infusions:   sodium chloride 100 mL/hr at 10/31/22 0801    sodium chloride      sodium chloride 100 mL/hr at 10/30/22 1659    dextrose       PRN Meds:sodium chloride flush, sodium chloride, ondansetron **OR** ondansetron, polyethylene glycol, acetaminophen **OR** acetaminophen, potassium chloride **OR** potassium alternative oral replacement **OR** potassium chloride, potassium chloride, magnesium sulfate, promethazine, metoclopramide, prochlorperazine, glucose, dextrose bolus **OR** dextrose bolus, glucagon (rDNA), dextrose, labetalol, LORazepam    SOCIAL HISTORY:     Tobacco:   reports that she has never smoked. She has never used smokeless tobacco.  Alcohol:   reports no history of alcohol use. Illicit drugs:  reports no history of drug use. FAMILY HISTORY:     History reviewed. No pertinent family history. REVIEW OF SYSTEMS:    Constitutional: No fever, no chills, no lethargy, no weakness. HEENT:  No headache, otalgia, itchy eyes, nasal discharge or sore throat. Cardiac:  No chest pain, dyspnea, orthopnea or PND. Chest:   No cough, phlegm or wheezing. Abdomen:  + abdominal pain, + nausea /vomiting with coffee-ground hematemesis. Neuro:  No focal weakness, abnormal movements or seizure like activity. Skin:   No rashes, no itching. :   No hematuria, no pyuria, no dysuria, no flank pain. Extremities:  No swelling or joint pains. ROS was otherwise negative except as mentioned in the [address]      PHYSICAL EXAM:    /82   Pulse 100   Temp 98.8 °F (37.1 °C) (Oral)   Resp 15   Ht 5' 1\" (1.549 m)   Wt 200 lb (90.7 kg)   SpO2 100%   BMI 37.79 kg/m²     GENERAL:   Well developed, Well nourished, No apparent distress  HEAD:   Normocephalic, Atraumatic  EENT:   EOMI, Sclera not icteric, Oropharynx moist   NECK:   Supple, Trachea midline  LUNGS:  CTA Bilaterally  HEART:  RRR, No murmur  ABDOMEN:   Soft, mild diffuse tenderness, Nondistended, BS WNL  EXT:   No clubbing. No cyanosis. No edema. SKIN:   No rashes. No jaundice. No stigmata of liver disease. MUSC/SKEL:   Adequate muscle bulk for patient's age, No significant synovitis, No deformities  NEURO:  A&O x Three, CN II- XII grossly intact      LABS AND IMAGING:     CBC  Recent Labs     10/30/22  1313 10/31/22  0559   WBC 10.6 12.0*   HGB 10.5* 9.4*   HCT 31.7* 28.9*   MCV 83.2 85.8   MCH 27.6 27.9   MCHC 33.1 32.5    305       IMMATURE PLTs  No results found for: PLTFLUORE    BMP  Recent Labs     10/30/22  1313 10/31/22  0559    138   K 4.4 3.9   * 105   CO2 19* 18*   BUN 16 19   CREATININE 1.45* 1.75*   GLUCOSE 211* 192*   CALCIUM 8.9 8.1*       LFTS  Recent Labs     10/30/22  1313   ALKPHOS 80   BILITOT 0.5   AST 22   ALT 10   PROT 7.8   LABALBU 3.9       AMYLASE/LIPASE/AMMONIA  Recent Labs     10/30/22  1313   LIPASE 34       PT/INR  No results for input(s): PROTIME, INR in the last 72 hours. ANEMIA STUDIES  No results for input(s): IRON, LABIRON, TIBC, UIBC, FERRITIN, [id number], FOLATE, OCCULTBLD in the last 72 hours.       LIVER WORK UP:    Acute Hepatitis Panel   No results found for: HEPBSAG, HEPCAB, HEPBIGM, HEPAIGM    HCV Genotype   No results found for: HEPATITISCGENOTYPE    HCV Quantitative   No results found for: HCVQNT    AFP  No results found for: AFP    Alpha 1 antitrypsin   No results found for: A1A    Anti - Liver/Kidney Ab  No results found for: LIVER-KIDNEYMICROSOMALAB    FRANCISCO  No results found for: FRANCISCO    AMA  No results found for: MITOAB    ASMA  No results found for: SMOOTHMUSCAB    Ceruloplasmin  No results found for: CERULOPLSM    Celiac panel  No results found for: TISSTRNTIIGG, TTGIGA, IGA    IgG  No results found for: IGG    IgM  No results found for: IGM    GGT   No results found for: LABGGT    PT/INR  No results for input(s): PROTIME, INR in the last 72 hours. Cancer Markers:  CEA:  No results found for: CEA  Ca 125:  No results found for:   Ca 19-9:   No results found for:   AFP: No results found for: AFP    Lactic acid:No results for input(s): LACTACIDWB in the last 72 hours. IMAGING  CT ABDOMEN PELVIS WO CONTRAST Additional Contrast? None    Result Date: 10/30/2022  EXAMINATION: CT OF THE ABDOMEN AND PELVIS WITHOUT CONTRAST 10/30/2022 6:23 pm TECHNIQUE: CT of the abdomen and pelvis was performed without the administration of intravenous contrast. Multiplanar reformatted images are provided for review. Automated exposure control, iterative reconstruction, and/or weight based adjustment of the mA/kV was utilized to reduce the radiation dose to as low as reasonably achievable. COMPARISON: 01/26/2017 HISTORY: ORDERING SYSTEM PROVIDED HISTORY: severe abdominal pain TECHNOLOGIST PROVIDED HISTORY: severe abdominal pain Is the patient pregnant?->No FINDINGS: Lower Chest: The lung bases are clear and the heart size is normal.  There is a mild hiatal hernia. Organs: The liver, gallbladder, pancreas, and spleen are within normal limits for a noncontrast study. No adrenal masses are identified. There is symmetric stranding of fat around both kidneys. A small hypodensity within the left kidney may reflect a cyst.  No obstructing renal stones are visualized. GI/Bowel: There is no small bowel obstruction or free air. Trace pelvic free fluid is noted. The appendix is normal in caliber. Pelvis: The urinary bladder is unremarkable. The uterus and adnexal structures are grossly within normal limits. Peritoneum/Retroperitoneum: No pathologically enlarged lymph nodes are identified. Bones/Soft Tissues: No acute osseous abnormality. There is a unilateral L5 pars interarticularis defect. A small amount of subcutaneous emphysema within the left buttock is presumably related to a recent injection. Symmetric stranding of fat around the kidneys.   Correlate for clinical signs of pyelonephritis or renal infarct. Trace pelvic free fluid. XR CHEST PORTABLE    Result Date: 10/30/2022  EXAMINATION: ONE XRAY VIEW OF THE CHEST 10/30/2022 2:51 pm COMPARISON: None. HISTORY: ORDERING SYSTEM PROVIDED HISTORY: vomiting blood TECHNOLOGIST PROVIDED HISTORY: vomiting blood FINDINGS: The cardiac silhouette appears within normal limits. No confluent airspace opacity or pleural effusion is seen. No evidence of intra-free air is seen beneath the diaphragm. No evidence of acute pulmonary abnormality seen. No radiographic evidence of intra-free air is seen beneath the diaphragm. IMPRESSION:     1. Hematemesis. Suspect Delmis-Suarez tear versus esophagitis in the setting of persistent nausea and vomiting  2. Nausea and vomiting-resolved. Possibly secondary to uncontrolled diabetes versus pyelonephritis versus gastroenteritis versus other  3. Diarrhea-single episode-resolved -possible gastroenteritis  4. Diffuse abdominal pain-musculoskeletal in nature secondary to  5. Type 1 diabetes-uncontrolled  6. Obesity with a BMI of 37.79        Old records, labs and imaging reviewed. PLAN   1. No strong indication for EGD as nausea and vomiting has resolved. Discussed with patient who is in agreement with conservative treatment as EGD would not likely change our plan of care. 2.  Protonix 40 mg IV twice daily x 8 weeks then decrease to lowest effective dose. Okay to change to p.o. when tolerating diet  3. Elective outpatient EGD  4. Diet as tolerated  5. GI will sign off      Initial care time/code: Spent 80 out of 80 minutes on this date of service including chart prep, patient interaction, discussion with primary team, documentation and coordination of care for the above conditions    This plan was formulated in collaboration with Dr. Troy Kaiser  Thank you for allowing us to participate in the care of your patient.     Electronically signed by: KARON Thomas - CNP on 10/31/2022 at 9:50 AM     Please note that this note was generated using a voice recognition dictation software. Although every effort was made to ensure the accuracy of this automated transcription, some errors in transcription may have occurred.

## 2022-10-31 NOTE — PLAN OF CARE
Problem: Discharge Planning  Goal: Discharge to home or other facility with appropriate resources  Outcome: Progressing     Problem: Pain  Goal: Verbalizes/displays adequate comfort level or baseline comfort level  Outcome: Progressing  Flowsheets (Taken 10/31/2022 0757)  Verbalizes/displays adequate comfort level or baseline comfort level:   Encourage patient to monitor pain and request assistance   Assess pain using appropriate pain scale   Administer analgesics based on type and severity of pain and evaluate response     Problem: Safety - Adult  Goal: Free from fall injury  Outcome: Progressing     Problem: Chronic Conditions and Co-morbidities  Goal: Patient's chronic conditions and co-morbidity symptoms are monitored and maintained or improved  Outcome: Progressing

## 2022-10-31 NOTE — CARE COORDINATION
Case Management Assessment  Initial Evaluation    Date/Time of Evaluation: 10/31/2022 11:15 AM  Assessment Completed by: Praneeth Hilliard RN    If patient is discharged prior to next notation, then this note serves as note for discharge by case management. Patient Name: Selina Flynn                   YOB: 1985  Diagnosis: Hematemesis [K92.0]  Hematemesis with nausea [K92.0]                   Date / Time: 10/30/2022  1:08 PM    Patient Admission Status: Inpatient   Readmission Risk (Low < 19, Mod (19-27), High > 27): Readmission Risk Score: 10.7    Current PCP: No primary care provider on file. PCP verified by CM? (P) No (No PCP, list provided)    Chart Reviewed: Yes      History Provided by: (P) Patient  Patient Orientation: (P) Alert and Oriented, Person, Place    Patient Cognition: (P) Alert    Hospitalization in the last 30 days (Readmission):  No    If yes, Readmission Assessment in CM Navigator will be completed.     Advance Directives:      Code Status: Full Code   Patient's Primary Decision Maker is: (P) Patient Declined (Legal Next of Kin Remains as Decision Maker)      Discharge Planning:    Patient lives with: (P) Other (Comment) (roommate) Type of Home: (P) House  Primary Care Giver: (P) Self  Patient Support Systems include: (P) Friends/Neighbors   Current Financial resources: (P) Medicaid  Current community resources:    Current services prior to admission: (P) None            Current DME:              Type of Home Care services:  (P) None    ADLS  Prior functional level: (P) Independent in ADLs/IADLs  Current functional level: (P) Independent in ADLs/IADLs    PT AM-PAC:   /24  OT AM-PAC:   /24    Family can provide assistance at DC: (P) No  Would you like Case Management to discuss the discharge plan with any other family members/significant others, and if so, who? (P) No  Plans to Return to Present Housing: (P) Yes  Other Identified Issues/Barriers to RETURNING to current housing: na  Potential Assistance needed at discharge: (P) N/A            Potential DME:    Patient expects to discharge to: (P) [facility] for transportation at discharge: (P) Friends    Financial    Payor: [address] / Plan: [address] / Product Type: *No Product type* /     Does insurance require precert for SNF: Yes    Potential assistance Purchasing Medications: (P) No  Meds-to-Beds request: Yes      [id number] [address]  [facility] [address] [address]  Phone: [phone] Fax: [phone]      Notes:    Factors facilitating achievement of predicted outcomes: Cooperative    Barriers to discharge: Stairs at home    Additional Case Management Notes:  Spoke with patient at bedside. Role explained. Plan to return home. Has transportation. Denies further needs. Address, telephone numbers and ER contacts verified. No PCP list provided. The Plan for Transition of Care is related to the following treatment goals of Hematemesis [K92.0]  Hematemesis with nausea [F48.4]    IF APPLICABLE: The Patient and/or patient representative Carli and her family were provided with a choice of provider and agrees with the discharge plan. Freedom of choice list with basic dialogue that supports the patient's individualized plan of care/goals and shares the quality data associated with the providers was provided to: (P) Patient   Patient Representative Name:       The Patient and/or Patient Representative Agree with the Discharge Plan?  (P) Yes    Praneeth Hilliard RN  Case Management Department

## 2022-10-31 NOTE — FLOWSHEET NOTE
Patient request AMA form. Resident informed of patient desire to leave, explained readmission due to medical complications of course of treatment is not completed, like DKA, or unresolved infection.    Offered for physician to speak to patient at bedside patient states \"no thanks I understand\" AMA form completed

## 2022-10-31 NOTE — H&P
[facility]     Department of Internal Medicine - Staff Internal Medicine Teaching Service          ADMISSION NOTE/HISTORY AND PHYSICAL EXAMINATION   Date: 10/30/2022  Patient Name: Lila Del Rio  Date of admission: 10/30/2022  1:08 PM  YOB: 1985  PCP: No primary care provider on file. History Obtained From:  Patient, Electronic medical record    Tiffanie Fierro     Chief complaint: Hematemesis. HISTORY OF PRESENTING ILLNESS   Patient, 44years old female, presents to the hospital with hematemesis. Patient states that she was having episodes of nonbloody vomiting in the morning, followed by abdominal pain and hematemesis. Patient denies any fever and chills. Patient denies travel or new states and trying new food. Patient states that she cannot tolerate any oral diet as it aggravates her vomiting. Patient denies of having any such episodes ever before. In ED, her hemoglobin was 10.5. Her creatinine was 1.45 which is somewhat at baseline. Lipase was normal.  Her occult blood test came back positive. Chest x-ray was done which is negative for any acute abnormality. CT abdomen showed pyelonephritis or renal infarct. No other abnormality seen. Patient was given 80 Mg of pantoprazole IV in ED. Per records, patient also has a history of type 1 diabetes mellitus and was last seen at [facility]. She has been diagnosed since 2005. Her HbA1c was 7.6% on July 2022, she uses insulin aspart infusion pump. Patient also has chronic kidney disease as result of her type 1 diabetes. Per records her CKD has been stable and she has never been on dialysis. Review of Systems   Constitutional:  Positive for fatigue. Negative for activity change and chills. HENT: Negative. Eyes: Negative. Respiratory:  Negative for cough, choking, chest tightness and shortness of breath. Cardiovascular:  Negative for chest pain and leg swelling. Gastrointestinal:  Positive for abdominal pain, diarrhea, nausea and vomiting. Endocrine: Negative. Genitourinary: Negative. Musculoskeletal: Negative. Skin: Negative. Allergic/Immunologic: Negative. Neurological:  Positive for weakness and light-headedness. Hematological: Negative. Psychiatric/Behavioral: Negative. PAST MEDICAL HISTORY     Past Medical History:   Diagnosis Date    Diabetes mellitus ([facility] [address]     type 1    Hypertension        PAST SURGICAL HISTORY     Past Surgical History:   Procedure Laterality Date    TUBAL LIGATION  11/2018       ALLERGIES     Cinnamon and Morphine    MEDICATIONS PRIOR TO ADMISSION     Prior to Admission medications    Medication Sig Start Date End Date Taking? Authorizing Provider   LOSARTAN POTASSIUM PO Take by mouth    Historical Provider, MD   amLODIPine (NORVASC) 10 MG tablet Take 10 mg by mouth every evening    Historical Provider, MD   gabapentin (NEURONTIN) 300 MG capsule Take 300 mg by mouth 3 times daily.   Patient not taking: Reported on 10/30/2022    Historical Provider, MD   ATORVASTATIN CALCIUM PO Take by mouth every evening    Historical Provider, MD   cetirizine (ZYRTEC) 10 MG tablet Take 10 mg by mouth every evening    Historical Provider, MD   vitamin D (CHOLECALCIFEROL) 1000 UNIT TABS tablet Take 1,000 Units by mouth once a week Indications: on sundays  Patient not taking: Reported on 10/30/2022    Historical Provider, MD   Multiple Vitamins-Minerals (THERAPEUTIC MULTIVITAMIN-MINERALS) tablet Take 1 tablet by mouth daily  Patient not taking: Reported on 10/30/2022    Historical Provider, MD   ondansetron (ZOFRAN ODT) 4 MG disintegrating tablet Take 1 tablet by mouth every 8 hours as needed for Nausea or Vomiting  Patient not taking: Reported on 10/30/2022 1/26/17   Ana Maria Carcamo MD   AMITRIPTYLINE HCL PO Take by mouth    Historical Provider, MD   Insulin Infusion Pump BERTA by Does not apply route    Historical Provider, MD insulin aspart (NOVOLOG) 100 UNIT/ML injection vial Inject into the skin 3 times daily (before meals) Sliding scale    Historical Provider, MD       SOCIAL HISTORY     Tobacco: None  Alcohol: None  Illicits: None    FAMILY HISTORY     History reviewed. No pertinent family history. PHYSICAL EXAM     Vitals: BP (!) 179/112   Pulse (!) 113   Temp 98.6 °F (37 °C) (Axillary)   Resp (!) 32   Ht 5' 1\" (1.549 m)   Wt 200 lb (90.7 kg)   SpO2 100%   BMI 37.79 kg/m²   Tmax: Temp (24hrs), Av.1 °F (36.7 °C), Min:97.5 °F (36.4 °C), Max:98.6 °F (37 °C)    Last Body weight:   Wt Readings from Last 3 Encounters:   10/30/22 200 lb (90.7 kg)   19 200 lb (90.7 kg)   17 165 lb (74.8 kg)     Body Mass Index : Body mass index is 37.79 kg/m². Physical Exam  Constitutional:       General: She is awake. Comments: Actively retching   Cardiovascular:      Rate and Rhythm: Normal rate. Heart sounds: Normal heart sounds. Pulmonary:      Effort: Pulmonary effort is normal.      Breath sounds: Normal breath sounds. Abdominal:      Tenderness: There is generalized abdominal tenderness. There is no guarding or rebound. Musculoskeletal:      Right lower leg: No edema. Left lower leg: No edema. Neurological:      Mental Status: She is alert and oriented to person, place, and time. Psychiatric:         Behavior: Behavior is cooperative.      INVESTIGATIONS     Laboratory Testing:     Recent Results (from the past 24 hour(s))   CBC with Auto Differential    Collection Time: 10/30/22  1:13 PM   Result Value Ref Range    WBC 10.6 3.5 - 11.3 k/uL    RBC 3.81 (L) 3.95 - 5.11 m/uL    Hemoglobin 10.5 (L) 11.9 - 15.1 g/dL    Hematocrit 31.7 (L) 36.3 - 47.1 %    MCV 83.2 82.6 - 102.9 fL    MCH 27.6 25.2 - 33.5 pg    MCHC 33.1 28.4 - 34.8 g/dL    RDW 13.3 11.8 - 14.4 %    Platelets 264 435 - 334 k/uL    MPV 9.3 8.1 - 13.5 fL    NRBC Automated 0.0 0.0 per 100 WBC    Seg Neutrophils 84 (H) 36 - 65 % Lymphocytes 13 (L) 24 - 43 %    Monocytes 2 (L) 3 - 12 %    Eosinophils % 0 (L) 1 - 4 %    Basophils 1 0 - 2 %    Immature Granulocytes 0 0 %    Segs Absolute 8.90 (H) 1.50 - 8.10 k/uL    Absolute Lymph # 1.40 1.10 - 3.70 k/uL    Absolute Mono # 0.22 0.10 - 1.20 k/uL    Absolute Eos # <0.03 0.00 - 0.44 k/uL    Basophils Absolute 0.05 0.00 - 0.20 k/uL    Absolute Immature Granulocyte 0.04 0.00 - 0.30 k/uL   CMP    Collection Time: 10/30/22  1:13 PM   Result Value Ref Range    Glucose 211 (H) 70 - 99 mg/dL    BUN 16 6 - 20 mg/dL    Creatinine 1.45 (H) 0.50 - 0.90 mg/dL    Est, Glom Filt Rate 47 (L) >60 mL/min/1.73m2    Calcium 8.9 8.6 - 10.4 mg/dL    Sodium 137 135 - 144 mmol/L    Potassium 4.4 3.7 - 5.3 mmol/L    Chloride 108 (H) 98 - 107 mmol/L    CO2 19 (L) 20 - 31 mmol/L    Anion Gap 10 9 - 17 mmol/L    Alkaline Phosphatase 80 35 - 104 U/L    ALT 10 5 - 33 U/L    AST 22 <32 U/L    Total Bilirubin 0.5 0.3 - 1.2 mg/dL    Total Protein 7.8 6.4 - 8.3 g/dL    Albumin 3.9 3.5 - 5.2 g/dL    Albumin/Globulin Ratio 1.0 1.0 - 2.5   Lipase    Collection Time: 10/30/22  1:13 PM   Result Value Ref Range    Lipase 34 13 - 60 U/L   Magnesium    Collection Time: 10/30/22  1:13 PM   Result Value Ref Range    Magnesium 1.9 1.6 - 2.6 mg/dL   HCG Qualitative, Serum    Collection Time: 10/30/22  1:13 PM   Result Value Ref Range    hCG Qual NEGATIVE NEGATIVE   Occult blood gastric / duodenum    Collection Time: 10/30/22  2:08 PM   Result Value Ref Range    Specimen Description . GASTRIC     Special Requests QCOK   EXP [id number]       Direct Exam POSITIVE (A)    POC Glucose Fingerstick    Collection Time: 10/30/22  5:04 PM   Result Value Ref Range    POC Glucose 121 (H) 65 - 105 mg/dL       Imaging:   XR CHEST PORTABLE    Result Date: 10/30/2022  No evidence of acute pulmonary abnormality seen. No radiographic evidence of intra-free air is seen beneath the diaphragm.        ASSESSMENT & PLAN     ASSESSMENT / PLAN: IMPRESSION  Patient, 44years old female, presented to the hospital with hematemesis. Patient states that she started getting bouts of vomiting in the morning which were initially nonbloody but then changed to bloody vomiting with abdominal pain. Her abdominal imaging was done which was negative for any acute perforation. Her abdominal CT did show pyelonephritis or renal ischemia. Hematemesis  Plan:   Hemoglobin at presentation 10.5  Occult blood test positive  Chest x-ray abdominal CT negative for any acute pathology. Abdominal CT suggestive of pyelonephritis or renal infarct   GI consulted, appreciate recommendations. Hemoglobin monitor. Aspiration precautions    Type 1 diabetes mellitus with other specified complication ([facility] [address]  Plan:   Patient on insulin infusion aspart pump  POC glucose 121-99  Monitor for glucose      GI ppx: Protonix 40 Mg IV    Discharge Planning:  consulted, appreciate recommendations    Gisel Belcher MD  Internal Medicine Resident, PGY-1  [address], [place]  10/30/2022, 8:15 PM

## 2022-10-31 NOTE — PROGRESS NOTES
[facility]  Internal Medicine Teaching Residency Program  Inpatient Daily Progress Note  ______________________________________________________________________________    Patient: Darryn Duran  YOB: 1985   RFF:[id number]    Acct: [de-identified]     Room: 5/56-12  Admit date: 10/30/2022  Today's date: 10/31/22  Number of days in the hospital: 1    SUBJECTIVE   Admitting Diagnosis: Hematemesis  CC: Hematemesis. - Pt examined at bedside. Chart & results reviewed. Patient denies nausea and vomiting  Patient resting comfortably on bed  Patient does report mild abdominal pain  Patient hemodynamically stable  No acute events overnight  Patient states that she has her last episode of vomiting at 10 in the night      Review of Systems   Constitutional:  Positive for fatigue. Negative for chills, diaphoresis and fever. HENT: Negative. Eyes: Negative. Respiratory:  Negative for cough, chest tightness and shortness of breath. Cardiovascular:  Negative for chest pain and leg swelling. Gastrointestinal:  Positive for abdominal pain. Negative for blood in stool, constipation, diarrhea, nausea and vomiting. Endocrine: Negative. Genitourinary:  Negative for dysuria, flank pain, hematuria and urgency. Musculoskeletal: Negative. Skin: Negative. Allergic/Immunologic: Negative. Neurological: Negative. Hematological: Negative. Psychiatric/Behavioral: Negative. BRIEF HISTORY     Patient, 44years old female, presents to the hospital with hematemesis. Patient states that she was having episodes of nonbloody vomiting in the morning, followed by abdominal pain and hematemesis. Patient denies any fever and chills. Patient denies travel or new states and trying new food. Patient states that she cannot tolerate any oral diet as it aggravates her vomiting. Patient denies of having any such episodes ever before.  In ED, her hemoglobin was 10.5. Her creatinine was 1.45 which is somewhat at baseline. Lipase was normal.  Her occult blood test came back positive. Chest x-ray was done which is negative for any acute abnormality. CT abdomen showed pyelonephritis or renal infarct. No other abnormality seen. Patient was given 80 Mg of pantoprazole IV in ED. Per records, patient also has a history of type 1 diabetes mellitus and was last seen at [facility]. She has been diagnosed since . Her HbA1c was 7.6% on 2022, she uses insulin aspart infusion pump. Patient also has chronic kidney disease as result of her type 1 diabetes. Per records her CKD has been stable and she has never been on dialysis. OBJECTIVE     Vital Signs:  BP (!) 137/94   Pulse 88   Temp 98 °F (36.7 °C) (Oral)   Resp 19   Ht 5' 1\" (1.549 m)   Wt 200 lb (90.7 kg)   SpO2 100%   BMI 37.79 kg/m²     Temp (24hrs), Av.9 °F (36.6 °C), Min:97.4 °F (36.3 °C), Max:98.6 °F (37 °C)    In: 8.6   Out: -       Physical Exam  Constitutional:       General: She is awake. Comments: On room air   Cardiovascular:      Rate and Rhythm: Normal rate. Heart sounds: Normal heart sounds. Pulmonary:      Breath sounds: Normal breath sounds. Chest:      Chest wall: No swelling, tenderness or edema. Abdominal:      General: Abdomen is flat. Bowel sounds are normal.      Palpations: Abdomen is soft. Tenderness: There is no abdominal tenderness. Musculoskeletal:      Right lower leg: No edema. Left lower leg: No edema. Neurological:      Mental Status: She is alert and oriented to person, place, and time. Mental status is at baseline. Psychiatric:         Behavior: Behavior is cooperative.          Medications:  Scheduled Medications:    sodium chloride flush  5-40 mL IntraVENous 2 times per day    pantoprazole (PROTONIX) 40 mg injection  40 mg IntraVENous Q12H    insulin lispro  0-8 Units SubCUTAneous TID WC    insulin lispro 0-4 Units SubCUTAneous Nightly    cefTRIAXone (ROCEPHIN) IV  1,000 mg IntraVENous Q24H     Continuous Infusions:    sodium chloride      sodium chloride      sodium chloride 100 mL/hr at 10/30/22 1659    dextrose       PRN Medicationssodium chloride flush, 5-40 mL, PRN  sodium chloride, , PRN  ondansetron, 4 mg, Q8H PRN   Or  ondansetron, 4 mg, Q6H PRN  polyethylene glycol, 17 g, Daily PRN  acetaminophen, 650 mg, Q6H PRN   Or  acetaminophen, 650 mg, Q6H PRN  potassium chloride, 40 mEq, PRN   Or  potassium alternative oral replacement, 40 mEq, PRN   Or  potassium chloride, 10 mEq, PRN  potassium chloride, 10 mEq, PRN  magnesium sulfate, 2,000 mg, PRN  promethazine, 25 mg, Q6H PRN  metoclopramide, 10 mg, Q6H PRN  prochlorperazine, 10 mg, Q6H PRN  glucose, 4 tablet, PRN  dextrose bolus, 125 mL, PRN   Or  dextrose bolus, 250 mL, PRN  glucagon (rDNA), 1 mg, PRN  dextrose, , Continuous PRN  labetalol, 10 mg, Q4H PRN  LORazepam, 1 mg, Q6H PRN        Diagnostic Labs:  CBC:   Recent Labs     10/30/22  1313 10/31/22  0559   WBC 10.6 12.0*   RBC 3.81* 3.37*   HGB 10.5* 9.4*   HCT 31.7* 28.9*   MCV 83.2 85.8   RDW 13.3 13.5    305     BMP:   Recent Labs     10/30/22  1313 10/31/22  0559    138   K 4.4 3.9   * 105   CO2 19* 18*   BUN 16 19   CREATININE 1.45* 1.75*     BNP: No results for input(s): BNP in the last 72 hours. PT/INR: No results for input(s): PROTIME, INR in the last 72 hours. APTT: No results for input(s): APTT in the last 72 hours. CARDIAC ENZYMES: No results for input(s): CKMB, CKMBINDEX, TROPONINI in the last 72 hours.     Invalid input(s): CKTOTAL;3  FASTING LIPID PANEL:No results found for: CHOL, HDL, TRIG  LIVER PROFILE:   Recent Labs     10/30/22  1313   AST 22   ALT 10   BILITOT 0.5   ALKPHOS 80      MICROBIOLOGY:   Lab Results   Component Value Date/Time    CULTURE NO GROWTH <24 HRS 10/30/2022 11:35 PM    CULTURE NO GROWTH <24 HRS 10/30/2022 11:35 PM       Imaging:    CT ABDOMEN PELVIS WO CONTRAST Additional Contrast? None    Result Date: 10/30/2022  Symmetric stranding of fat around the kidneys. Correlate for clinical signs of pyelonephritis or renal infarct. Trace pelvic free fluid. XR CHEST PORTABLE    Result Date: 10/30/2022  No evidence of acute pulmonary abnormality seen. No radiographic evidence of intra-free air is seen beneath the diaphragm. ASSESSMENT & PLAN     ASSESSMENT / PLAN:   Patient, 44years old female, presented to the hospital with hematemesis. Patient states that she started getting bouts of vomiting in the morning which were initially nonbloody but then changed to bloody vomiting with abdominal pain. Her abdominal imaging was done which was negative for any acute perforation. Her abdominal CT did show pyelonephritis or renal ischemia. Hematemesis  Plan:   Hemoglobin at presentation 10.5  Occult blood test positive  Chest x-ray abdominal CT negative for any acute pathology. Abdominal CT suggestive of pyelonephritis or renal infarct   GI consulted, appreciate recommendations. Hemoglobin monitor. Aspiration precautions      Type 1 diabetes mellitus with other specified complication (HCC)  Plan:   Patient on insulin infusion aspart pump  POC glucose 121-99  Monitor for glucose    Pyelonephritis  Plan:   CT proven pyelonephritis  Urinalysis negative for UTI  Patient currently on ceftriaxone    Plan for today  Follow-up with lactic acid  Follow-up with Beta  hydroxybutyrate level  Continue ceftriaxone till cultures came back  GI ppx: Protonix 40 Mg IV    Discharge Planning:  consulted, appreciate recommendations    Indigo Wright M.D. Internal Medicine Resident PGY-1  [facility].    7:16 AM 10/31/2022     Please note that part of this chart was generated using voice recognition dictation software.  Although every effort was made to ensure the accuracy of this automated transcription, some errors in transcription may have occurred.

## 2022-10-31 NOTE — CARE COORDINATION
[place] [facility] [place]  Written by: Jair Woodruff RN    Patient Name: Chuy Canales  Attending Provider: No att. providers found  Admit Date: 10/30/2022  1:08 PM  MRN: [id number]  Account: [de-identified]                     : 1985  Discharge Date: 10/31/2022      Disposition: home left AMA     Jair Woodruff RN

## 2022-10-31 NOTE — CARE COORDINATION
Transitional Planning  Call from Radha Moreira from [facility], wanting discharge plan. Update provided along with room telephone number.

## 2022-11-01 ENCOUNTER — APPOINTMENT (OUTPATIENT)
Dept: GENERAL RADIOLOGY | Age: 37
DRG: 368 | End: 2022-11-01
Payer: COMMERCIAL

## 2022-11-01 ENCOUNTER — HOSPITAL ENCOUNTER (INPATIENT)
Age: 37
LOS: 5 days | Discharge: HOME | DRG: 368 | End: 2022-11-06
Attending: EMERGENCY MEDICINE | Admitting: INTERNAL MEDICINE
Payer: COMMERCIAL

## 2022-11-01 DIAGNOSIS — F41.9 ANXIETY: ICD-10-CM

## 2022-11-01 DIAGNOSIS — K92.0 HEMATEMESIS WITHOUT NAUSEA: ICD-10-CM

## 2022-11-01 DIAGNOSIS — N12 PYELONEPHRITIS: Primary | ICD-10-CM

## 2022-11-01 PROBLEM — N10 ACUTE PYELONEPHRITIS: Status: ACTIVE | Noted: 2022-11-01

## 2022-11-01 LAB
ABSOLUTE EOS #: <0.03 K/UL (ref 0–0.44)
ABSOLUTE IMMATURE GRANULOCYTE: 0.07 K/UL (ref 0–0.3)
ABSOLUTE LYMPH #: 1.66 K/UL (ref 1.1–3.7)
ABSOLUTE MONO #: 0.33 K/UL (ref 0.1–1.2)
ALBUMIN SERPL-MCNC: 3.8 G/DL (ref 3.5–5.2)
ALBUMIN/GLOBULIN RATIO: 1 (ref 1–2.5)
ALP BLD-CCNC: 72 U/L (ref 35–104)
ALT SERPL-CCNC: 11 U/L (ref 5–33)
ANION GAP SERPL CALCULATED.3IONS-SCNC: 18 MMOL/L (ref 9–17)
ANION GAP SERPL CALCULATED.3IONS-SCNC: 18 MMOL/L (ref 9–17)
AST SERPL-CCNC: 23 U/L
BACTERIA: ABNORMAL
BASOPHILS # BLD: 1 % (ref 0–2)
BASOPHILS ABSOLUTE: 0.07 K/UL (ref 0–0.2)
BETA-HYDROXYBUTYRATE: 2.85 MMOL/L (ref 0.02–0.27)
BILIRUB SERPL-MCNC: 0.5 MG/DL (ref 0.3–1.2)
BILIRUBIN URINE: NEGATIVE
BUN BLDV-MCNC: 16 MG/DL (ref 6–20)
BUN BLDV-MCNC: 16 MG/DL (ref 6–20)
CALCIUM SERPL-MCNC: 8.4 MG/DL (ref 8.6–10.4)
CALCIUM SERPL-MCNC: 9 MG/DL (ref 8.6–10.4)
CARBOXYHEMOGLOBIN: 1.1 % (ref 0–5)
CASTS UA: ABNORMAL /LPF (ref 0–8)
CHLORIDE BLD-SCNC: 104 MMOL/L (ref 98–107)
CHLORIDE BLD-SCNC: 104 MMOL/L (ref 98–107)
CO2: 16 MMOL/L (ref 20–31)
CO2: 16 MMOL/L (ref 20–31)
COLOR: YELLOW
CREAT SERPL-MCNC: 1.68 MG/DL (ref 0.5–0.9)
CREAT SERPL-MCNC: 1.75 MG/DL (ref 0.5–0.9)
EOSINOPHILS RELATIVE PERCENT: 0 % (ref 1–4)
EPITHELIAL CELLS UA: ABNORMAL /HPF (ref 0–5)
FIO2: ABNORMAL
FOLATE: 15.9 NG/ML
GFR SERPL CREATININE-BSD FRML MDRD: 38 ML/MIN/1.73M2
GFR SERPL CREATININE-BSD FRML MDRD: 40 ML/MIN/1.73M2
GLUCOSE BLD-MCNC: 114 MG/DL (ref 65–105)
GLUCOSE BLD-MCNC: 117 MG/DL (ref 65–105)
GLUCOSE BLD-MCNC: 128 MG/DL (ref 70–99)
GLUCOSE BLD-MCNC: 142 MG/DL (ref 65–105)
GLUCOSE BLD-MCNC: 144 MG/DL (ref 65–105)
GLUCOSE BLD-MCNC: 154 MG/DL (ref 70–99)
GLUCOSE BLD-MCNC: 65 MG/DL (ref 65–105)
GLUCOSE BLD-MCNC: 68 MG/DL (ref 65–105)
GLUCOSE URINE: NEGATIVE
HCG QUALITATIVE: NEGATIVE
HCO3 VENOUS: 18.4 MMOL/L (ref 24–30)
HCT VFR BLD CALC: 29.6 % (ref 36.3–47.1)
HEMOGLOBIN: 9.9 G/DL (ref 11.9–15.1)
IMMATURE GRANULOCYTES: 1 %
KETONES, URINE: ABNORMAL
LACTIC ACID, WHOLE BLOOD: 1.6 MMOL/L (ref 0.7–2.1)
LACTIC ACID, WHOLE BLOOD: 2.8 MMOL/L (ref 0.7–2.1)
LEUKOCYTE ESTERASE, URINE: NEGATIVE
LIPASE: 19 U/L (ref 13–60)
LYMPHOCYTES # BLD: 15 % (ref 24–43)
MCH RBC QN AUTO: 27.9 PG (ref 25.2–33.5)
MCHC RBC AUTO-ENTMCNC: 33.4 G/DL (ref 28.4–34.8)
MCV RBC AUTO: 83.4 FL (ref 82.6–102.9)
MONOCYTES # BLD: 3 % (ref 3–12)
NEGATIVE BASE EXCESS, VEN: 4.9 MMOL/L (ref 0–2)
NITRITE, URINE: NEGATIVE
NRBC AUTOMATED: 0 PER 100 WBC
O2 SAT, VEN: 79 % (ref 60–85)
PATIENT TEMP: 37
PCO2, VEN: 29.5 MM HG (ref 39–55)
PDW BLD-RTO: 13.6 % (ref 11.8–14.4)
PH UA: 5.5 (ref 5–8)
PH VENOUS: 7.41 (ref 7.32–7.42)
PLATELET # BLD: 329 K/UL (ref 138–453)
PMV BLD AUTO: 9.4 FL (ref 8.1–13.5)
PO2, VEN: 43.5 MM HG (ref 30–50)
POTASSIUM SERPL-SCNC: 3.5 MMOL/L (ref 3.7–5.3)
POTASSIUM SERPL-SCNC: 3.5 MMOL/L (ref 3.7–5.3)
PROTEIN UA: ABNORMAL
RBC # BLD: 3.55 M/UL (ref 3.95–5.11)
RBC UA: ABNORMAL /HPF (ref 0–4)
SEG NEUTROPHILS: 80 % (ref 36–65)
SEGMENTED NEUTROPHILS ABSOLUTE COUNT: 8.61 K/UL (ref 1.5–8.1)
SODIUM BLD-SCNC: 138 MMOL/L (ref 135–144)
SODIUM BLD-SCNC: 138 MMOL/L (ref 135–144)
SPECIFIC GRAVITY UA: 1.02 (ref 1–1.03)
TOTAL PROTEIN: 7.7 G/DL (ref 6.4–8.3)
TSH SERPL DL<=0.05 MIU/L-ACNC: 2.26 UIU/ML (ref 0.3–5)
TURBIDITY: CLEAR
URINE HGB: ABNORMAL
UROBILINOGEN, URINE: NORMAL
VITAMIN B-12: 425 PG/ML (ref 232–1245)
VITAMIN D 25-HYDROXY: 30.1 NG/ML
WBC # BLD: 10.8 K/UL (ref 3.5–11.3)
WBC UA: ABNORMAL /HPF (ref 0–5)

## 2022-11-01 PROCEDURE — 99223 1ST HOSP IP/OBS HIGH 75: CPT | Performed by: INTERNAL MEDICINE

## 2022-11-01 PROCEDURE — 36415 COLL VENOUS BLD VENIPUNCTURE: CPT

## 2022-11-01 PROCEDURE — 2580000003 HC RX 258

## 2022-11-01 PROCEDURE — 82947 ASSAY GLUCOSE BLOOD QUANT: CPT

## 2022-11-01 PROCEDURE — 99285 EMERGENCY DEPT VISIT HI MDM: CPT

## 2022-11-01 PROCEDURE — 82805 BLOOD GASES W/O2 SATURATION: CPT

## 2022-11-01 PROCEDURE — C9113 INJ PANTOPRAZOLE SODIUM, VIA: HCPCS

## 2022-11-01 PROCEDURE — 83690 ASSAY OF LIPASE: CPT

## 2022-11-01 PROCEDURE — 80053 COMPREHEN METABOLIC PANEL: CPT

## 2022-11-01 PROCEDURE — 82607 VITAMIN B-12: CPT

## 2022-11-01 PROCEDURE — 2060000000 HC ICU INTERMEDIATE R&B

## 2022-11-01 PROCEDURE — 80048 BASIC METABOLIC PNL TOTAL CA: CPT

## 2022-11-01 PROCEDURE — 84443 ASSAY THYROID STIM HORMONE: CPT

## 2022-11-01 PROCEDURE — 6360000002 HC RX W HCPCS: Performed by: STUDENT IN AN ORGANIZED HEALTH CARE EDUCATION/TRAINING PROGRAM

## 2022-11-01 PROCEDURE — 96375 TX/PRO/DX INJ NEW DRUG ADDON: CPT

## 2022-11-01 PROCEDURE — 6360000002 HC RX W HCPCS

## 2022-11-01 PROCEDURE — 87086 URINE CULTURE/COLONY COUNT: CPT

## 2022-11-01 PROCEDURE — 81001 URINALYSIS AUTO W/SCOPE: CPT

## 2022-11-01 PROCEDURE — 82306 VITAMIN D 25 HYDROXY: CPT

## 2022-11-01 PROCEDURE — 6370000000 HC RX 637 (ALT 250 FOR IP)

## 2022-11-01 PROCEDURE — 83735 ASSAY OF MAGNESIUM: CPT

## 2022-11-01 PROCEDURE — 2500000003 HC RX 250 WO HCPCS

## 2022-11-01 PROCEDURE — 82010 KETONE BODYS QUAN: CPT

## 2022-11-01 PROCEDURE — A4216 STERILE WATER/SALINE, 10 ML: HCPCS | Performed by: STUDENT IN AN ORGANIZED HEALTH CARE EDUCATION/TRAINING PROGRAM

## 2022-11-01 PROCEDURE — 96365 THER/PROPH/DIAG IV INF INIT: CPT

## 2022-11-01 PROCEDURE — 83605 ASSAY OF LACTIC ACID: CPT

## 2022-11-01 PROCEDURE — 85025 COMPLETE CBC W/AUTO DIFF WBC: CPT

## 2022-11-01 PROCEDURE — 2500000003 HC RX 250 WO HCPCS: Performed by: STUDENT IN AN ORGANIZED HEALTH CARE EDUCATION/TRAINING PROGRAM

## 2022-11-01 PROCEDURE — 84703 CHORIONIC GONADOTROPIN ASSAY: CPT

## 2022-11-01 PROCEDURE — 82746 ASSAY OF FOLIC ACID SERUM: CPT

## 2022-11-01 PROCEDURE — 74018 RADEX ABDOMEN 1 VIEW: CPT

## 2022-11-01 PROCEDURE — 2580000003 HC RX 258: Performed by: STUDENT IN AN ORGANIZED HEALTH CARE EDUCATION/TRAINING PROGRAM

## 2022-11-01 PROCEDURE — 93005 ELECTROCARDIOGRAM TRACING: CPT

## 2022-11-01 RX ORDER — METOCLOPRAMIDE HYDROCHLORIDE 5 MG/ML
10 INJECTION INTRAMUSCULAR; INTRAVENOUS ONCE
Status: COMPLETED | OUTPATIENT
Start: 2022-11-01 | End: 2022-11-01

## 2022-11-01 RX ORDER — AMLODIPINE BESYLATE 10 MG/1
10 TABLET ORAL EVERY EVENING
Status: DISCONTINUED | OUTPATIENT
Start: 2022-11-01 | End: 2022-11-06 | Stop reason: HOSPADM

## 2022-11-01 RX ORDER — SODIUM CHLORIDE 9 MG/ML
INJECTION, SOLUTION INTRAVENOUS PRN
Status: DISCONTINUED | OUTPATIENT
Start: 2022-11-01 | End: 2022-11-04

## 2022-11-01 RX ORDER — DICYCLOMINE HYDROCHLORIDE 10 MG/ML
20 INJECTION INTRAMUSCULAR 4 TIMES DAILY PRN
Status: DISCONTINUED | OUTPATIENT
Start: 2022-11-01 | End: 2022-11-06 | Stop reason: HOSPADM

## 2022-11-01 RX ORDER — INSULIN LISPRO 100 [IU]/ML
0-4 INJECTION, SOLUTION INTRAVENOUS; SUBCUTANEOUS NIGHTLY
Status: DISCONTINUED | OUTPATIENT
Start: 2022-11-01 | End: 2022-11-03

## 2022-11-01 RX ORDER — 0.9 % SODIUM CHLORIDE 0.9 %
1000 INTRAVENOUS SOLUTION INTRAVENOUS ONCE
Status: COMPLETED | OUTPATIENT
Start: 2022-11-01 | End: 2022-11-01

## 2022-11-01 RX ORDER — PROMETHAZINE HYDROCHLORIDE 25 MG/ML
6.25 INJECTION, SOLUTION INTRAMUSCULAR; INTRAVENOUS ONCE
Status: COMPLETED | OUTPATIENT
Start: 2022-11-01 | End: 2022-11-01

## 2022-11-01 RX ORDER — ONDANSETRON 2 MG/ML
4 INJECTION INTRAMUSCULAR; INTRAVENOUS EVERY 6 HOURS PRN
Status: DISCONTINUED | OUTPATIENT
Start: 2022-11-01 | End: 2022-11-03

## 2022-11-01 RX ORDER — HEPARIN SODIUM 5000 [USP'U]/ML
5000 INJECTION, SOLUTION INTRAVENOUS; SUBCUTANEOUS EVERY 8 HOURS SCHEDULED
Status: DISCONTINUED | OUTPATIENT
Start: 2022-11-01 | End: 2022-11-06 | Stop reason: HOSPADM

## 2022-11-01 RX ORDER — FENTANYL CITRATE 50 UG/ML
25 INJECTION, SOLUTION INTRAMUSCULAR; INTRAVENOUS ONCE
Status: COMPLETED | OUTPATIENT
Start: 2022-11-02 | End: 2022-11-01

## 2022-11-01 RX ORDER — KETOROLAC TROMETHAMINE 30 MG/ML
30 INJECTION, SOLUTION INTRAMUSCULAR; INTRAVENOUS ONCE
Status: COMPLETED | OUTPATIENT
Start: 2022-11-01 | End: 2022-11-01

## 2022-11-01 RX ORDER — ACETAMINOPHEN 650 MG/1
650 SUPPOSITORY RECTAL EVERY 6 HOURS PRN
Status: DISCONTINUED | OUTPATIENT
Start: 2022-11-01 | End: 2022-11-02

## 2022-11-01 RX ORDER — ALPRAZOLAM 0.25 MG/1
0.5 TABLET ORAL 2 TIMES DAILY PRN
Status: DISCONTINUED | OUTPATIENT
Start: 2022-11-01 | End: 2022-11-02

## 2022-11-01 RX ORDER — HYDROXYZINE HYDROCHLORIDE 25 MG/1
25 TABLET, FILM COATED ORAL 3 TIMES DAILY PRN
COMMUNITY

## 2022-11-01 RX ORDER — METOCLOPRAMIDE HYDROCHLORIDE 5 MG/ML
10 INJECTION INTRAMUSCULAR; INTRAVENOUS EVERY 6 HOURS
Status: DISCONTINUED | OUTPATIENT
Start: 2022-11-01 | End: 2022-11-05

## 2022-11-01 RX ORDER — SODIUM CHLORIDE 9 MG/ML
INJECTION, SOLUTION INTRAVENOUS CONTINUOUS
Status: DISCONTINUED | OUTPATIENT
Start: 2022-11-01 | End: 2022-11-02

## 2022-11-01 RX ORDER — DEXTROSE MONOHYDRATE 100 MG/ML
INJECTION, SOLUTION INTRAVENOUS CONTINUOUS PRN
Status: DISCONTINUED | OUTPATIENT
Start: 2022-11-01 | End: 2022-11-06 | Stop reason: HOSPADM

## 2022-11-01 RX ORDER — POTASSIUM CHLORIDE 7.45 MG/ML
10 INJECTION INTRAVENOUS
Status: COMPLETED | OUTPATIENT
Start: 2022-11-01 | End: 2022-11-01

## 2022-11-01 RX ORDER — SODIUM CHLORIDE 0.9 % (FLUSH) 0.9 %
5-40 SYRINGE (ML) INJECTION EVERY 12 HOURS SCHEDULED
Status: DISCONTINUED | OUTPATIENT
Start: 2022-11-01 | End: 2022-11-06 | Stop reason: HOSPADM

## 2022-11-01 RX ORDER — LORAZEPAM 2 MG/ML
0.5 INJECTION INTRAMUSCULAR PRN
Status: COMPLETED | OUTPATIENT
Start: 2022-11-01 | End: 2022-11-02

## 2022-11-01 RX ORDER — HYDROXYZINE HYDROCHLORIDE 10 MG/1
10 TABLET, FILM COATED ORAL 3 TIMES DAILY PRN
Status: DISCONTINUED | OUTPATIENT
Start: 2022-11-01 | End: 2022-11-06 | Stop reason: HOSPADM

## 2022-11-01 RX ORDER — ACETAMINOPHEN 325 MG/1
650 TABLET ORAL EVERY 6 HOURS PRN
Status: DISCONTINUED | OUTPATIENT
Start: 2022-11-01 | End: 2022-11-02

## 2022-11-01 RX ORDER — ONDANSETRON 2 MG/ML
4 INJECTION INTRAMUSCULAR; INTRAVENOUS ONCE
Status: COMPLETED | OUTPATIENT
Start: 2022-11-01 | End: 2022-11-01

## 2022-11-01 RX ORDER — ONDANSETRON 4 MG/1
4 TABLET, ORALLY DISINTEGRATING ORAL EVERY 8 HOURS PRN
Status: DISCONTINUED | OUTPATIENT
Start: 2022-11-01 | End: 2022-11-03

## 2022-11-01 RX ORDER — INSULIN LISPRO 100 [IU]/ML
0-4 INJECTION, SOLUTION INTRAVENOUS; SUBCUTANEOUS
Status: DISCONTINUED | OUTPATIENT
Start: 2022-11-01 | End: 2022-11-03

## 2022-11-01 RX ORDER — SODIUM CHLORIDE 0.9 % (FLUSH) 0.9 %
5-40 SYRINGE (ML) INJECTION PRN
Status: DISCONTINUED | OUTPATIENT
Start: 2022-11-01 | End: 2022-11-06 | Stop reason: HOSPADM

## 2022-11-01 RX ORDER — HYDROXYZINE HYDROCHLORIDE 10 MG/1
10 TABLET, FILM COATED ORAL 3 TIMES DAILY PRN
Status: DISCONTINUED | OUTPATIENT
Start: 2022-11-01 | End: 2022-11-01

## 2022-11-01 RX ORDER — POLYETHYLENE GLYCOL 3350 17 G/17G
17 POWDER, FOR SOLUTION ORAL DAILY PRN
Status: DISCONTINUED | OUTPATIENT
Start: 2022-11-01 | End: 2022-11-06 | Stop reason: HOSPADM

## 2022-11-01 RX ORDER — LORAZEPAM 2 MG/ML
0.5 INJECTION INTRAMUSCULAR ONCE
Status: DISCONTINUED | OUTPATIENT
Start: 2022-11-02 | End: 2022-11-01

## 2022-11-01 RX ORDER — DIPHENHYDRAMINE HYDROCHLORIDE 50 MG/ML
25 INJECTION INTRAMUSCULAR; INTRAVENOUS ONCE
Status: COMPLETED | OUTPATIENT
Start: 2022-11-01 | End: 2022-11-01

## 2022-11-01 RX ORDER — LABETALOL HYDROCHLORIDE 5 MG/ML
10 INJECTION, SOLUTION INTRAVENOUS EVERY 6 HOURS PRN
Status: DISCONTINUED | OUTPATIENT
Start: 2022-11-01 | End: 2022-11-06 | Stop reason: HOSPADM

## 2022-11-01 RX ADMIN — POTASSIUM CHLORIDE 10 MEQ: 7.46 INJECTION, SOLUTION INTRAVENOUS at 23:37

## 2022-11-01 RX ADMIN — KETOROLAC TROMETHAMINE 30 MG: 30 INJECTION, SOLUTION INTRAMUSCULAR at 11:31

## 2022-11-01 RX ADMIN — FAMOTIDINE 20 MG: 10 INJECTION, SOLUTION INTRAVENOUS at 12:30

## 2022-11-01 RX ADMIN — ONDANSETRON 4 MG: 2 INJECTION INTRAMUSCULAR; INTRAVENOUS at 13:20

## 2022-11-01 RX ADMIN — POTASSIUM CHLORIDE 10 MEQ: 7.46 INJECTION, SOLUTION INTRAVENOUS at 22:34

## 2022-11-01 RX ADMIN — DEXTROSE MONOHYDRATE 125 ML: 100 INJECTION, SOLUTION INTRAVENOUS at 17:29

## 2022-11-01 RX ADMIN — ONDANSETRON 4 MG: 2 INJECTION INTRAMUSCULAR; INTRAVENOUS at 20:45

## 2022-11-01 RX ADMIN — Medication 10 MG: at 20:50

## 2022-11-01 RX ADMIN — FENTANYL CITRATE 25 MCG: 50 INJECTION, SOLUTION INTRAMUSCULAR; INTRAVENOUS at 23:44

## 2022-11-01 RX ADMIN — METOCLOPRAMIDE 10 MG: 5 INJECTION, SOLUTION INTRAMUSCULAR; INTRAVENOUS at 11:32

## 2022-11-01 RX ADMIN — SODIUM CHLORIDE: 9 INJECTION, SOLUTION INTRAVENOUS at 20:59

## 2022-11-01 RX ADMIN — CEFTRIAXONE SODIUM 1000 MG: 1 INJECTION, POWDER, FOR SOLUTION INTRAMUSCULAR; INTRAVENOUS at 12:32

## 2022-11-01 RX ADMIN — METOCLOPRAMIDE 10 MG: 5 INJECTION, SOLUTION INTRAMUSCULAR; INTRAVENOUS at 20:45

## 2022-11-01 RX ADMIN — SODIUM CHLORIDE, PRESERVATIVE FREE 10 ML: 5 INJECTION INTRAVENOUS at 20:51

## 2022-11-01 RX ADMIN — DIPHENHYDRAMINE HYDROCHLORIDE 25 MG: 50 INJECTION, SOLUTION INTRAMUSCULAR; INTRAVENOUS at 11:31

## 2022-11-01 RX ADMIN — SODIUM CHLORIDE, PRESERVATIVE FREE 40 MG: 5 INJECTION INTRAVENOUS at 20:45

## 2022-11-01 RX ADMIN — DICYCLOMINE HYDROCHLORIDE 20 MG: 10 INJECTION, SOLUTION INTRAMUSCULAR at 22:17

## 2022-11-01 RX ADMIN — PROMETHAZINE HYDROCHLORIDE 6.25 MG: 25 INJECTION INTRAMUSCULAR; INTRAVENOUS at 23:27

## 2022-11-01 RX ADMIN — METOCLOPRAMIDE 10 MG: 5 INJECTION, SOLUTION INTRAMUSCULAR; INTRAVENOUS at 15:15

## 2022-11-01 RX ADMIN — ALPRAZOLAM 0.5 MG: 0.25 TABLET ORAL at 22:34

## 2022-11-01 RX ADMIN — SODIUM CHLORIDE 1000 ML: 9 INJECTION, SOLUTION INTRAVENOUS at 11:37

## 2022-11-01 RX ADMIN — SODIUM CHLORIDE: 9 INJECTION, SOLUTION INTRAVENOUS at 17:59

## 2022-11-01 ASSESSMENT — PAIN DESCRIPTION - LOCATION
LOCATION: ABDOMEN;BACK
LOCATION: ABDOMEN;FLANK
LOCATION: ABDOMEN;BACK

## 2022-11-01 ASSESSMENT — ENCOUNTER SYMPTOMS
ABDOMINAL DISTENTION: 0
VOMITING: 1
CONSTIPATION: 0
RHINORRHEA: 0
SORE THROAT: 0
ANAL BLEEDING: 0
PHOTOPHOBIA: 0
DIARRHEA: 0
CHEST TIGHTNESS: 0
ABDOMINAL PAIN: 1
SHORTNESS OF BREATH: 0
NAUSEA: 1

## 2022-11-01 ASSESSMENT — PAIN DESCRIPTION - PAIN TYPE: TYPE: CHRONIC PAIN

## 2022-11-01 ASSESSMENT — PAIN DESCRIPTION - ORIENTATION: ORIENTATION: LEFT

## 2022-11-01 ASSESSMENT — PAIN SCALES - GENERAL
PAINLEVEL_OUTOF10: 8
PAINLEVEL_OUTOF10: 5

## 2022-11-01 ASSESSMENT — PAIN DESCRIPTION - DESCRIPTORS: DESCRIPTORS: ACHING;TENDER

## 2022-11-01 ASSESSMENT — PAIN - FUNCTIONAL ASSESSMENT: PAIN_FUNCTIONAL_ASSESSMENT: 0-10

## 2022-11-01 NOTE — ED NOTES
Report called to Aroldo Rojas RN. Aware that patient is still vomiting and perfect serve to admitting resident pending for orders.      Serge Kumar RN  11/01/22 [id number]

## 2022-11-01 NOTE — ED PROVIDER NOTES
[facility]  ED  Emergency Department Encounter  EmergencyMedicine Resident     Pt Name:Carli Torres  MRN: [id number]  [place] 1985  Date of evaluation: 11/1/22  PCP:  No primary care provider on file. CHIEF COMPLAINT       Chief Complaint   Patient presents with    Emesis    Abdominal Pain       HISTORY OF PRESENT ILLNESS  (Location/Symptom, Timing/Onset, Context/Setting, Quality, Duration, Modifying Factors, Severity.)      Selina Flynn is a 44 y.o. female who presents with worsening bilateral flank pain lower abdominal pain nausea vomiting. Patient signed out AMA yesterday from hospital.  She was being treated for pyelonephritis and hematemesis likely secondary to Delmis-Suarez tear. PAST MEDICAL / SURGICAL / SOCIAL / FAMILY HISTORY      has a past medical history of Diabetes mellitus ([facility] [address] and Hypertension. has a past surgical history that includes Tubal ligation (11/2018). Social History     Socioeconomic History    Marital status: Single     Spouse name: Not on file    Number of children: Not on file    Years of education: Not on file    Highest education level: Not on file   Occupational History    Not on file   Tobacco Use    Smoking status: Never    Smokeless tobacco: Never   Substance and Sexual Activity    Alcohol use: No    Drug use: Never    Sexual activity: Not on file   Other Topics Concern    Not on file   Social History Narrative    Not on file     Social Determinants of Health     Financial Resource Strain: Not on file   Food Insecurity: Not on file   Transportation Needs: Not on file   Physical Activity: Not on file   Stress: Not on file   Social Connections: Not on file   Intimate Partner Violence: Not on file   Housing Stability: Not on file       No family history on file. Allergies:  Cinnamon and Morphine    Home Medications:  Prior to Admission medications    Medication Sig Start Date End Date Taking?  Authorizing Provider   LOSARTAN POTASSIUM PO Take by mouth    Historical Provider, MD   amLODIPine (NORVASC) 10 MG tablet Take 10 mg by mouth every evening    Historical Provider, MD   gabapentin (NEURONTIN) 300 MG capsule Take 300 mg by mouth 3 times daily. Patient not taking: Reported on 10/30/2022    Historical Provider, MD   ATORVASTATIN CALCIUM PO Take by mouth every evening    Historical Provider, MD   cetirizine (ZYRTEC) 10 MG tablet Take 10 mg by mouth every evening    Historical Provider, MD   vitamin D (CHOLECALCIFEROL) 1000 UNIT TABS tablet Take 1,000 Units by mouth once a week Indications: on sundays  Patient not taking: Reported on 10/30/2022    Historical Provider, MD   Multiple Vitamins-Minerals (THERAPEUTIC MULTIVITAMIN-MINERALS) tablet Take 1 tablet by mouth daily  Patient not taking: Reported on 10/30/2022    Historical Provider, MD   ondansetron (ZOFRAN ODT) 4 MG disintegrating tablet Take 1 tablet by mouth every 8 hours as needed for Nausea or Vomiting  Patient not taking: Reported on 10/30/2022 1/26/17   Reena Moe MD   AMITRIPTYLINE HCL PO Take by mouth    Historical Provider, MD   Insulin Infusion Pump BERTA by Does not apply route    Historical Provider, MD   insulin aspart (NOVOLOG) 100 UNIT/ML injection vial Inject into the skin 3 times daily (before meals) Sliding scale    Historical Provider, MD       REVIEW OF SYSTEMS    (2-9 systems for level 4, 10 or more for level 5)      Review of Systems   Constitutional:  Negative for chills and fever. HENT:  Negative for rhinorrhea and sore throat. Eyes:  Negative for photophobia. Respiratory:  Negative for chest tightness and shortness of breath. Cardiovascular:  Negative for chest pain. Gastrointestinal:  Positive for abdominal pain, nausea and vomiting. Negative for abdominal distention, anal bleeding, constipation and diarrhea. Endocrine: Negative for polyuria. Genitourinary:  Positive for flank pain. Negative for difficulty urinating.    Musculoskeletal:  Negative for arthralgias. Skin:  Negative for rash. Neurological:  Negative for weakness and headaches. PHYSICAL EXAM   (up to 7 for level 4, 8 or more for level 5)      INITIAL VITALS:   BP (!) 187/103   Pulse 100   Temp 98.5 °F (36.9 °C) (Axillary)   Resp 24   Ht 5' 1\" (1.549 m)   Wt 200 lb (90.7 kg)   SpO2 100%   BMI 37.79 kg/m²     Physical Exam  Constitutional:       General: She is in acute distress. HENT:      Head: Normocephalic and atraumatic. Nose: Nose normal.      Mouth/Throat:      Mouth: Mucous membranes are dry. Eyes:      Pupils: Pupils are equal, round, and reactive to light. Cardiovascular:      Rate and Rhythm: Tachycardia present. Pulses: Normal pulses. Heart sounds: Normal heart sounds. Pulmonary:      Breath sounds: Normal breath sounds. Abdominal:      General: There is no distension. Palpations: Abdomen is soft. Tenderness: There is abdominal tenderness. There is right CVA tenderness and left CVA tenderness. There is no guarding or rebound. Musculoskeletal:      Cervical back: No tenderness. Right lower leg: No edema. Left lower leg: No edema. Skin:     Capillary Refill: Capillary refill takes less than 2 seconds. Coloration: Skin is not jaundiced. Findings: No rash. Neurological:      General: No focal deficit present. Mental Status: She is oriented to person, place, and time.        DIFFERENTIAL  DIAGNOSIS     PLAN (LABS / IMAGING / EKG):  Orders Placed This Encounter   Procedures    Culture, Urine    Urinalysis with Microscopic    CBC with Auto Differential    CMP    Lipase    Lactic Acid    HCG Qualitative, Serum    Inpatient consult to Internal Medicine    POC Glucose Fingerstick    ADMIT TO INPATIENT       MEDICATIONS ORDERED:  Orders Placed This Encounter   Medications    metoclopramide (REGLAN) injection 10 mg    diphenhydrAMINE (BENADRYL) injection 25 mg    0.9 % sodium chloride bolus    ketorolac (TORADOL) injection 30 mg    famotidine (PEPCID) 20 mg in sodium chloride (PF) 0.9 % 10 mL injection    cefTRIAXone (ROCEPHIN) 1,000 mg in sodium chloride 0.9 % 50 mL IVPB mini-bag     Order Specific Question:   Antimicrobial Indications     Answer:   Urinary Tract Infection    ondansetron (ZOFRAN) injection 4 mg       DIAGNOSTIC RESULTS / EMERGENCY DEPARTMENT COURSE / MDM   LAB RESULTS:  Results for orders placed or performed during the hospital encounter of 11/01/22   CBC with Auto Differential   Result Value Ref Range    WBC 10.8 3.5 - 11.3 k/uL    RBC 3.55 (L) 3.95 - 5.11 m/uL    Hemoglobin 9.9 (L) 11.9 - 15.1 g/dL    Hematocrit 29.6 (L) 36.3 - 47.1 %    MCV 83.4 82.6 - 102.9 fL    MCH 27.9 25.2 - 33.5 pg    MCHC 33.4 28.4 - 34.8 g/dL    RDW 13.6 11.8 - 14.4 %    Platelets 898 788 - 630 k/uL    MPV 9.4 8.1 - 13.5 fL    NRBC Automated 0.0 0.0 per 100 WBC    Seg Neutrophils 80 (H) 36 - 65 %    Lymphocytes 15 (L) 24 - 43 %    Monocytes 3 3 - 12 %    Eosinophils % 0 (L) 1 - 4 %    Basophils 1 0 - 2 %    Immature Granulocytes 1 (H) 0 %    Segs Absolute 8.61 (H) 1.50 - 8.10 k/uL    Absolute Lymph # 1.66 1.10 - 3.70 k/uL    Absolute Mono # 0.33 0.10 - 1.20 k/uL    Absolute Eos # <0.03 0.00 - 0.44 k/uL    Basophils Absolute 0.07 0.00 - 0.20 k/uL    Absolute Immature Granulocyte 0.07 0.00 - 0.30 k/uL   CMP   Result Value Ref Range    Glucose 154 (H) 70 - 99 mg/dL    BUN 16 6 - 20 mg/dL    Creatinine 1.75 (H) 0.50 - 0.90 mg/dL    Est, Glom Filt Rate 38 (L) >60 mL/min/1.73m2    Calcium 9.0 8.6 - 10.4 mg/dL    Sodium 138 135 - 144 mmol/L    Potassium 3.5 (L) 3.7 - 5.3 mmol/L    Chloride 104 98 - 107 mmol/L    CO2 16 (L) 20 - 31 mmol/L    Anion Gap 18 (H) 9 - 17 mmol/L    Alkaline Phosphatase 72 35 - 104 U/L    ALT 11 5 - 33 U/L    AST 23 <32 U/L    Total Bilirubin 0.5 0.3 - 1.2 mg/dL    Total Protein 7.7 6.4 - 8.3 g/dL    Albumin 3.8 3.5 - 5.2 g/dL    Albumin/Globulin Ratio 1.0 1.0 - 2.5   Lipase   Result Value Ref Range    Lipase 19 13 - 60 U/L   Lactic Acid   Result Value Ref Range    Lactic Acid, Whole Blood 2.8 (H) 0.7 - 2.1 mmol/L   HCG Qualitative, Serum   Result Value Ref Range    hCG Qual NEGATIVE NEGATIVE   POC Glucose Fingerstick   Result Value Ref Range    POC Glucose 144 (H) 65 - 105 mg/dL       RADIOLOGY:  CT ABDOMEN PELVIS WO CONTRAST Additional Contrast? None    Result Date: 10/30/2022  Symmetric stranding of fat around the kidneys. Correlate for clinical signs of pyelonephritis or renal infarct. Trace pelvic free fluid. XR CHEST PORTABLE    Result Date: 10/30/2022  No evidence of acute pulmonary abnormality seen. No radiographic evidence of intra-free air is seen beneath the diaphragm. EKG Interpretation  none    EMERGENCY DEPARTMENT COURSE:  ED Course as of 11/01/22 1310   Tue Nov 01, 2022   1107 Patient left AMA yesterday was noted to have bilateral pyelonephritis possible renal infarcts. Nausea vomiting mild kidney pain abdominal pain. Will get labs will give symptomatic control plan for admission [ZE]   1308 Spoke with medicine service patient be admitted. [ZE]      ED Course User Index  [ZE] Luz Freeman DO       PROCEDURES:  Procedures     CONSULTS:  IP CONSULT TO INTERNAL MEDICINE    CRITICAL CARE:  none    MDM  Patient presents in acute distress hemoglobin stable from hematemesis. Tachycardic otherwise vital stable. Patient had minimal relief with antiemetics and fluids. Labs were per patient's baseline from previous admission. She was admitted for continued work-up and care pyelonephritis and hematemesis    FINAL IMPRESSION      1. Pyelonephritis          DISPOSITION / PLAN     DISPOSITION Admitted 11/01/2022 01:01:37 PM      PATIENT REFERRED TO:  No follow-up provider specified.     DISCHARGE MEDICATIONS:  New Prescriptions    No medications on file       Manuel Tsai DO  Emergency Medicine Resident    (Please note that portions of thisnote were completed with a voice recognition program.  Efforts were made to edit the dictations but occasionally words are mis-transcribed.)        Aime Han DO  Resident  11/01/22 [id number]

## 2022-11-01 NOTE — ED NOTES
EKG shown to admitting resident and signed by ER physician.      Roxana Strickland RN  11/01/22 0874

## 2022-11-01 NOTE — ED PROVIDER NOTES
[facility] ED     Emergency Department     Faculty Attestation        I performed a history and physical examination of the patient and discussed management with the resident. I reviewed the residents note and agree with the documented findings and plan of care. Any areas of disagreement are noted on the chart. I was personally present for the key portions of any procedures. I have documented in the chart those procedures where I was not present during the key portions. I have reviewed the emergency nurses triage note. I agree with the chief complaint, past medical history, past surgical history, allergies, medications, social and family history as documented unless otherwise noted below. For Physician Assistant/ Nurse Practitioner cases/documentation I have personally evaluated this patient and have completed at least one if not all key elements of the E/M (history, physical exam, and MDM). Additional findings are as noted. Vital Signs: BP: (!) 187/103  Heart Rate: 100  Resp: 24  Temp: 98.5 °F (36.9 °C) SpO2: 100 %  PCP:  No primary care provider on file. Pertinent Comments:     Patient is a 27-year-old female with recent admission and leaving AMA with admission for pyelonephritis with likely renal infarct as well as nausea and vomiting associated with likely Delmis-Suarez tear or gastritis. Patient had symptoms return relatively quickly after leaving the hospital.   Currently does have some hematemesis with some coffee-ground component/dark digested blood component. Denies any blood in the stool however. There is bilateral back pain associated consistent with her pyelonephritis as well. Assessment/plan: We will attend better symptomatic control as well as give prophylaxis with Pepcid or Protonix.    Start IV ceftriaxone as well    Critical Care  None      (Please note that portions of this note were completed with a voice recognition program. Efforts were made to edit the dictations but occasionally words are mis-transcribed.  Whenever words are used in this note in any gender, they shall be construed as though they were used in the gender appropriate to the circumstances; and whenever words are used in this note in the singular or plural form, they shall be construed as though they were used in the form appropriate to the circumstances.)    MD Alejandro Kapoor  Attending Emergency Medicine Physician           Zoe Centeno MD  11/01/22 1124

## 2022-11-01 NOTE — ED NOTES
Spoke to admitting resident verbally regarding phenergan for patient at [phone]. Stated she would order it. Perfect served at 1808 asking if medication was able to be ordered as patient was not due for any nausea medications yet and was still vomiting. Did not respond.      Millicent Langston RN  11/01/22 3603

## 2022-11-01 NOTE — ED TRIAGE NOTES
Patient arrives c/o uncontrollable vomiting. Emesis is dark brown. Also has diarrhea. Dx with possible kidney infection recently, left AMA yesterday for similar symptoms.

## 2022-11-01 NOTE — H&P
[facility]     Department of Internal Medicine - Staff Internal Medicine Teaching Service          ADMISSION NOTE/HISTORY AND PHYSICAL EXAMINATION   Date: 11/1/2022  Patient Name: Suzette Stephens  Date of admission: 11/1/2022 10:50 AM  YOB: 1985  PCP: No primary care provider on file. History Obtained From:  patient    CHIEF COMPLAINT     Chief complaint: Emesis; bloody emesis; abdominal pain    HISTORY OF PRESENTING ILLNESS     The patient is a pleasant 44 y.o. female presents with a chief complaint of nausea, hematemesis, and abdominal pain. The patient was recently admitted to the hospital with the same complaints which she had episodes of nonbloody vomiting in the morning following by hematemesis and abdominal pain. During the previous admission, her hemoglobin was 10.5, creatinine was 1.45 which was baseline, lipase was normal, occult blood was positive. CT abdomen showed pyelonephritis/renal infarct. She was started on IV ceftriaxone. She was also given IV PPI twice daily due to hematemesis likely secondary to Delmis-Suarez tear. GI was consulted who recommended conservative treatment and not EGD with Protonix 40 mg IV twice daily for 8 weeks and change to p.o. when the patient tolerates. After this, the patient left AMA. The patient presented to ED today with the similar complaints with increased intensity of abdominal pain and vomiting frequency. During evaluation, she had hematemesis with coffee-ground component/dark digested blood component in her vomitus. She also complains of abdominal and bilateral flank pain. The patient reports that she was tolerating liquids yesterday but today, she woke up and started throwing up uncontrollably. She also complains of chills. During examination, the patient had diffuse abdominal tenderness. The patient was again started on ceftriaxone IV 1 dose.             Review of Systems:  General ROS: Completed and except as mentioned above were negative   HEENT ROS: Completed and except as mentioned above were negative   Respiratory ROS: Chest examination normal.  No wheezing or rhonchi during bilateral chest auscultation. Cardiovascular ROS:  Completed and except as mentioned above were negative  Gastrointestinal ROS: Diffuse abdominal pain with tenderness present during exam.  Neurological ROS: Patient awake, alert, AAO X1, under distress due to pain. PAST MEDICAL HISTORY     Past Medical History:   Diagnosis Date    Diabetes mellitus ([place])     type 1    Hypertension        PAST SURGICAL HISTORY     Past Surgical History:   Procedure Laterality Date    TUBAL LIGATION  11/2018       ALLERGIES     Cinnamon and Morphine    MEDICATIONS PRIOR TO ADMISSION     Prior to Admission medications    Medication Sig Start Date End Date Taking? Authorizing Provider   LOSARTAN POTASSIUM PO Take by mouth    Historical Provider, MD   amLODIPine (NORVASC) 10 MG tablet Take 10 mg by mouth every evening    Historical Provider, MD   gabapentin (NEURONTIN) 300 MG capsule Take 300 mg by mouth 3 times daily.   Patient not taking: Reported on 10/30/2022    Historical Provider, MD   ATORVASTATIN CALCIUM PO Take by mouth every evening    Historical Provider, MD   cetirizine (ZYRTEC) 10 MG tablet Take 10 mg by mouth every evening    Historical Provider, MD   vitamin D (CHOLECALCIFEROL) 1000 UNIT TABS tablet Take 1,000 Units by mouth once a week Indications: on sundays  Patient not taking: Reported on 10/30/2022    Historical Provider, MD   Multiple Vitamins-Minerals (THERAPEUTIC MULTIVITAMIN-MINERALS) tablet Take 1 tablet by mouth daily  Patient not taking: Reported on 10/30/2022    Historical Provider, MD   ondansetron (ZOFRAN ODT) 4 MG disintegrating tablet Take 1 tablet by mouth every 8 hours as needed for Nausea or Vomiting  Patient not taking: Reported on 10/30/2022 1/26/17   Hugo Cagle MD   AMITRIPTYLINE HCL PO Take by mouth Historical Provider, MD   Insulin Infusion Pump BERTA by Does not apply route    Historical Provider, MD   insulin aspart (NOVOLOG) 100 UNIT/ML injection vial Inject into the skin 3 times daily (before meals) Sliding scale    Historical Provider, MD       SOCIAL HISTORY     Tobacco:   Tobacco Use: Low Risk     Smoking Tobacco Use: Never    Smokeless Tobacco Use: Never    Passive Exposure: Not on file      Alcohol:   Alcohol Use: Not on file     FAMILY HISTORY     No family history on file. PHYSICAL EXAM     Vitals: BP (!) 166/94   Pulse (!) 104   Temp 98.5 °F (36.9 °C) (Axillary)   Resp 22   Ht 5' 1\" (1.549 m)   Wt 200 lb (90.7 kg)   SpO2 100%   BMI 37.79 kg/m²   Tmax: Temp (24hrs), Av.5 °F (36.9 °C), Min:98.5 °F (36.9 °C), Max:98.5 °F (36.9 °C)    Last Body weight:   Wt Readings from Last 3 Encounters:   22 200 lb (90.7 kg)   10/30/22 200 lb (90.7 kg)   19 200 lb (90.7 kg)     Body Mass Index : Body mass index is 37.79 kg/m². PHYSICAL EXAMINATION:  Constitutional: This is a well developed, well nourished, 35-39.9 - Obesity Grade II 44y.o. year old female who is alert, oriented, cooperative and in no apparent distress. Head:normocephalic and atraumatic. EENT:  PERRLA. No conjunctival injections. Neck: Supple without thyromegaly. No elevated JVP. Respiratory: Chest was symmetrical without dullness to percussion. Breath sounds bilaterally were clear to auscultation. There were no wheezes, rhonchi or rales. Cardiovascular: Regular without murmur, clicks, gallops or rubs. Abdomen: Diffuse abdominal tenderness noted. Bowel sounds normal.  Musculoskeletal: Normal curvature of the spine. No gross muscle weakness. Extremities:  No lower extremity edema, ulcerations, tenderness, varicosities or erythema. Muscle size, tone and strength are normal.  No involuntary movements are noted.         INVESTIGATIONS     Laboratory Testing:     Recent Results (from the past 24 hour(s))   POC Glucose Fingerstick    Collection Time: 11/01/22 11:18 AM   Result Value Ref Range    POC Glucose 144 (H) 65 - 105 mg/dL   CBC with Auto Differential    Collection Time: 11/01/22 11:26 AM   Result Value Ref Range    WBC 10.8 3.5 - 11.3 k/uL    RBC 3.55 (L) 3.95 - 5.11 m/uL    Hemoglobin 9.9 (L) 11.9 - 15.1 g/dL    Hematocrit 29.6 (L) 36.3 - 47.1 %    MCV 83.4 82.6 - 102.9 fL    MCH 27.9 25.2 - 33.5 pg    MCHC 33.4 28.4 - 34.8 g/dL    RDW 13.6 11.8 - 14.4 %    Platelets 279 045 - 931 k/uL    MPV 9.4 8.1 - 13.5 fL    NRBC Automated 0.0 0.0 per 100 WBC    Seg Neutrophils 80 (H) 36 - 65 %    Lymphocytes 15 (L) 24 - 43 %    Monocytes 3 3 - 12 %    Eosinophils % 0 (L) 1 - 4 %    Basophils 1 0 - 2 %    Immature Granulocytes 1 (H) 0 %    Segs Absolute 8.61 (H) 1.50 - 8.10 k/uL    Absolute Lymph # 1.66 1.10 - 3.70 k/uL    Absolute Mono # 0.33 0.10 - 1.20 k/uL    Absolute Eos # <0.03 0.00 - 0.44 k/uL    Basophils Absolute 0.07 0.00 - 0.20 k/uL    Absolute Immature Granulocyte 0.07 0.00 - 0.30 k/uL   CMP    Collection Time: 11/01/22 11:26 AM   Result Value Ref Range    Glucose 154 (H) 70 - 99 mg/dL    BUN 16 6 - 20 mg/dL    Creatinine 1.75 (H) 0.50 - 0.90 mg/dL    Est, Glom Filt Rate 38 (L) >60 mL/min/1.73m2    Calcium 9.0 8.6 - 10.4 mg/dL    Sodium 138 135 - 144 mmol/L    Potassium 3.5 (L) 3.7 - 5.3 mmol/L    Chloride 104 98 - 107 mmol/L    CO2 16 (L) 20 - 31 mmol/L    Anion Gap 18 (H) 9 - 17 mmol/L    Alkaline Phosphatase 72 35 - 104 U/L    ALT 11 5 - 33 U/L    AST 23 <32 U/L    Total Bilirubin 0.5 0.3 - 1.2 mg/dL    Total Protein 7.7 6.4 - 8.3 g/dL    Albumin 3.8 3.5 - 5.2 g/dL    Albumin/Globulin Ratio 1.0 1.0 - 2.5   Lipase    Collection Time: 11/01/22 11:26 AM   Result Value Ref Range    Lipase 19 13 - 60 U/L   Lactic Acid    Collection Time: 11/01/22 11:26 AM   Result Value Ref Range    Lactic Acid, Whole Blood 2.8 (H) 0.7 - 2.1 mmol/L   HCG Qualitative, Serum    Collection Time: 11/01/22 11:26 AM Result Value Ref Range    hCG Qual NEGATIVE NEGATIVE   POC Glucose Fingerstick    Collection Time: 11/01/22  2:59 PM   Result Value Ref Range    POC Glucose 114 (H) 65 - 105 mg/dL   Urinalysis with Microscopic    Collection Time: 11/01/22  4:10 PM   Result Value Ref Range    Color, UA Yellow Yellow    Turbidity UA Clear Clear    Glucose, Ur NEGATIVE NEGATIVE    Bilirubin Urine NEGATIVE NEGATIVE    Ketones, Urine MODERATE (A) NEGATIVE    Specific Gravity, UA 1.016 1.005 - 1.030    Urine Hgb MODERATE (A) NEGATIVE    pH, UA 5.5 5.0 - 8.0    Protein, UA 4+ (A) NEGATIVE    Urobilinogen, Urine Normal Normal    Nitrite, Urine NEGATIVE NEGATIVE    Leukocyte Esterase, Urine NEGATIVE NEGATIVE    WBC, UA 0 TO 2 0 - 5 /HPF    RBC, UA 2 TO 5 0 - 4 /HPF    Casts UA  0 - 8 /LPF     10 TO 20 HYALINE Reference range defined for non-centrifuged specimen. Epithelial Cells UA 0 TO 2 0 - 5 /HPF    Bacteria, UA FEW (A) None   POC Glucose Fingerstick    Collection Time: 11/01/22  4:56 PM   Result Value Ref Range    POC Glucose 65 65 - 105 mg/dL   POC Glucose Fingerstick    Collection Time: 11/01/22  5:26 PM   Result Value Ref Range    POC Glucose 68 65 - 105 mg/dL   POC Glucose Fingerstick    Collection Time: 11/01/22  5:57 PM   Result Value Ref Range    POC Glucose 142 (H) 65 - 105 mg/dL       Imaging:   CT ABDOMEN PELVIS WO CONTRAST Additional Contrast? None    Result Date: 10/30/2022  Symmetric stranding of fat around the kidneys. Correlate for clinical signs of pyelonephritis or renal infarct. Trace pelvic free fluid. XR CHEST PORTABLE    Result Date: 10/30/2022  No evidence of acute pulmonary abnormality seen. No radiographic evidence of intra-free air is seen beneath the diaphragm. ASSESSMENT & PLAN     ASSESSMENT / PLAN:    Principal Problem:    Hematemesis  Active Problems:    Pyelonephritis    Acute pyelonephritis  Resolved Problems:    * No resolved hospital problems.  *     Hematemesis possibly secondary to Delmis-Suarez tear:  -Hemoglobin at presentation 9.9  -Previous chest x-ray and CT negative for any perforation  -GI was consulted on previous admission on 10/30/2022. Recommended conservative treatment with IV PPI Protonix 40 mg twice daily for 8 weeks with transition to oral when tolerating diet. We will continue with psych recommendation.  -We will monitor hemoglobin. Pyelonephritis/acute pyelonephritis:  -Patient's CT on previous admission revealed pyelonephritis/renal infarct.  -Patient given ceftriaxone once in ED.  -WBC count within normal limits  -Urine culture sent. We will follow-up with results    Abdominal pain/emesis possibly secondary to intestinal obstruction:  -We will follow-up on plain x-ray of abdomen and right upper quadrant USG.  0 Reglan 10 mg IV every 6 hours started.  -0.9% sodium chloride  mL/h started    Type 1 diabetes mellitus:  -Patient on insulin pump at home  -Started on low-dose sliding scale insulin  -Hypoglycemia protocol in place  -Monitor for glucose    DVT ppx: Heparin  GI ppx: Protonix    PT/OT/SW: Consulted  Discharge Planning: In process    Raul Dean MD  Internal Medicine Resident, PGY-1  [address], [place]  11/1/2022, 7:16 PM  Attending Physician Statement  I have discussed the care of [facility], including pertinent history and exam findings,  with the resident. I have seen and examined the patient and the key elements of all parts of the encounter have been performed by me. I agree with the assessment, plan and orders as documented by the resident with additions . Seen in ER. Treatment plan Discussed with nursing staff in detail , all questions answered . Electronically signed by Deann Arcos MD on   11/1/22 at 9:30 PM EDT    Please note that this chart was generated using voice recognition Dragon dictation software.   Although every effort was made to ensure the accuracy of this automated transcription, some errors in transcription may have occurred.

## 2022-11-02 ENCOUNTER — ANESTHESIA (OUTPATIENT)
Dept: ENDOSCOPY | Age: 37
DRG: 368 | End: 2022-11-02
Payer: COMMERCIAL

## 2022-11-02 ENCOUNTER — APPOINTMENT (OUTPATIENT)
Dept: ULTRASOUND IMAGING | Age: 37
DRG: 368 | End: 2022-11-02
Payer: COMMERCIAL

## 2022-11-02 ENCOUNTER — ANESTHESIA EVENT (OUTPATIENT)
Dept: ENDOSCOPY | Age: 37
DRG: 368 | End: 2022-11-02
Payer: COMMERCIAL

## 2022-11-02 LAB
ABSOLUTE EOS #: <0.03 K/UL (ref 0–0.44)
ABSOLUTE IMMATURE GRANULOCYTE: 0.05 K/UL (ref 0–0.3)
ABSOLUTE LYMPH #: 2.25 K/UL (ref 1.1–3.7)
ABSOLUTE MONO #: 0.67 K/UL (ref 0.1–1.2)
ANION GAP SERPL CALCULATED.3IONS-SCNC: 15 MMOL/L (ref 9–17)
BASOPHILS # BLD: 1 % (ref 0–2)
BASOPHILS ABSOLUTE: 0.05 K/UL (ref 0–0.2)
BUN BLDV-MCNC: 17 MG/DL (ref 6–20)
CALCIUM SERPL-MCNC: 8.1 MG/DL (ref 8.6–10.4)
CHLORIDE BLD-SCNC: 109 MMOL/L (ref 98–107)
CO2: 18 MMOL/L (ref 20–31)
CREAT SERPL-MCNC: 1.9 MG/DL (ref 0.5–0.9)
CULTURE: NORMAL
EKG ATRIAL RATE: 103 BPM
EKG P AXIS: 59 DEGREES
EKG P-R INTERVAL: 130 MS
EKG Q-T INTERVAL: 348 MS
EKG QRS DURATION: 74 MS
EKG QTC CALCULATION (BAZETT): 455 MS
EKG R AXIS: 24 DEGREES
EKG T AXIS: 51 DEGREES
EKG VENTRICULAR RATE: 103 BPM
EOSINOPHILS RELATIVE PERCENT: 0 % (ref 1–4)
GFR SERPL CREATININE-BSD FRML MDRD: 34 ML/MIN/1.73M2
GLUCOSE BLD-MCNC: 134 MG/DL (ref 65–105)
GLUCOSE BLD-MCNC: 136 MG/DL (ref 70–99)
GLUCOSE BLD-MCNC: 149 MG/DL (ref 65–105)
GLUCOSE BLD-MCNC: 152 MG/DL (ref 65–105)
HCT VFR BLD CALC: 26 % (ref 36.3–47.1)
HEMOGLOBIN: 8.7 G/DL (ref 11.9–15.1)
IMMATURE GRANULOCYTES: 1 %
LYMPHOCYTES # BLD: 20 % (ref 24–43)
MAGNESIUM: 1.8 MG/DL (ref 1.6–2.6)
MCH RBC QN AUTO: 28.2 PG (ref 25.2–33.5)
MCHC RBC AUTO-ENTMCNC: 33.5 G/DL (ref 28.4–34.8)
MCV RBC AUTO: 84.1 FL (ref 82.6–102.9)
MONOCYTES # BLD: 6 % (ref 3–12)
NRBC AUTOMATED: 0 PER 100 WBC
PDW BLD-RTO: 13.9 % (ref 11.8–14.4)
PLATELET # BLD: 244 K/UL (ref 138–453)
PMV BLD AUTO: 9.4 FL (ref 8.1–13.5)
POTASSIUM SERPL-SCNC: 3.6 MMOL/L (ref 3.7–5.3)
RBC # BLD: 3.09 M/UL (ref 3.95–5.11)
SEG NEUTROPHILS: 72 % (ref 36–65)
SEGMENTED NEUTROPHILS ABSOLUTE COUNT: 8.02 K/UL (ref 1.5–8.1)
SODIUM BLD-SCNC: 142 MMOL/L (ref 135–144)
SPECIMEN DESCRIPTION: NORMAL
WBC # BLD: 11.1 K/UL (ref 3.5–11.3)

## 2022-11-02 PROCEDURE — 2500000003 HC RX 250 WO HCPCS

## 2022-11-02 PROCEDURE — 76705 ECHO EXAM OF ABDOMEN: CPT

## 2022-11-02 PROCEDURE — 82947 ASSAY GLUCOSE BLOOD QUANT: CPT

## 2022-11-02 PROCEDURE — 2709999900 HC NON-CHARGEABLE SUPPLY: Performed by: INTERNAL MEDICINE

## 2022-11-02 PROCEDURE — 88305 TISSUE EXAM BY PATHOLOGIST: CPT

## 2022-11-02 PROCEDURE — 2500000003 HC RX 250 WO HCPCS: Performed by: INTERNAL MEDICINE

## 2022-11-02 PROCEDURE — 7100000011 HC PHASE II RECOVERY - ADDTL 15 MIN: Performed by: INTERNAL MEDICINE

## 2022-11-02 PROCEDURE — 99223 1ST HOSP IP/OBS HIGH 75: CPT | Performed by: INTERNAL MEDICINE

## 2022-11-02 PROCEDURE — 6360000002 HC RX W HCPCS: Performed by: INTERNAL MEDICINE

## 2022-11-02 PROCEDURE — C9113 INJ PANTOPRAZOLE SODIUM, VIA: HCPCS

## 2022-11-02 PROCEDURE — 80048 BASIC METABOLIC PNL TOTAL CA: CPT

## 2022-11-02 PROCEDURE — 2580000003 HC RX 258: Performed by: INTERNAL MEDICINE

## 2022-11-02 PROCEDURE — 2580000003 HC RX 258

## 2022-11-02 PROCEDURE — 3700000000 HC ANESTHESIA ATTENDED CARE: Performed by: INTERNAL MEDICINE

## 2022-11-02 PROCEDURE — 43239 EGD BIOPSY SINGLE/MULTIPLE: CPT | Performed by: INTERNAL MEDICINE

## 2022-11-02 PROCEDURE — 2500000003 HC RX 250 WO HCPCS: Performed by: NURSE ANESTHETIST, CERTIFIED REGISTERED

## 2022-11-02 PROCEDURE — 99232 SBSQ HOSP IP/OBS MODERATE 35: CPT | Performed by: INTERNAL MEDICINE

## 2022-11-02 PROCEDURE — 0DB58ZX EXCISION OF ESOPHAGUS, VIA NATURAL OR ARTIFICIAL OPENING ENDOSCOPIC, DIAGNOSTIC: ICD-10-PCS | Performed by: INTERNAL MEDICINE

## 2022-11-02 PROCEDURE — 6360000002 HC RX W HCPCS

## 2022-11-02 PROCEDURE — 93010 ELECTROCARDIOGRAM REPORT: CPT | Performed by: INTERNAL MEDICINE

## 2022-11-02 PROCEDURE — 2060000000 HC ICU INTERMEDIATE R&B

## 2022-11-02 PROCEDURE — 2580000003 HC RX 258: Performed by: NURSE ANESTHETIST, CERTIFIED REGISTERED

## 2022-11-02 PROCEDURE — 3609012400 HC EGD TRANSORAL BIOPSY SINGLE/MULTIPLE: Performed by: INTERNAL MEDICINE

## 2022-11-02 PROCEDURE — 36415 COLL VENOUS BLD VENIPUNCTURE: CPT

## 2022-11-02 PROCEDURE — 6370000000 HC RX 637 (ALT 250 FOR IP): Performed by: INTERNAL MEDICINE

## 2022-11-02 PROCEDURE — 7100000010 HC PHASE II RECOVERY - FIRST 15 MIN: Performed by: INTERNAL MEDICINE

## 2022-11-02 PROCEDURE — C9113 INJ PANTOPRAZOLE SODIUM, VIA: HCPCS | Performed by: INTERNAL MEDICINE

## 2022-11-02 PROCEDURE — 6360000002 HC RX W HCPCS: Performed by: NURSE ANESTHETIST, CERTIFIED REGISTERED

## 2022-11-02 PROCEDURE — 85025 COMPLETE CBC W/AUTO DIFF WBC: CPT

## 2022-11-02 RX ORDER — 0.9 % SODIUM CHLORIDE 0.9 %
1000 INTRAVENOUS SOLUTION INTRAVENOUS ONCE
Status: COMPLETED | OUTPATIENT
Start: 2022-11-02 | End: 2022-11-02

## 2022-11-02 RX ORDER — SODIUM CHLORIDE 9 MG/ML
INJECTION, SOLUTION INTRAVENOUS CONTINUOUS PRN
Status: DISCONTINUED | OUTPATIENT
Start: 2022-11-02 | End: 2022-11-02 | Stop reason: SDUPTHER

## 2022-11-02 RX ORDER — PROMETHAZINE HYDROCHLORIDE 25 MG/ML
6.25 INJECTION, SOLUTION INTRAMUSCULAR; INTRAVENOUS EVERY 4 HOURS PRN
Status: DISCONTINUED | OUTPATIENT
Start: 2022-11-02 | End: 2022-11-06 | Stop reason: HOSPADM

## 2022-11-02 RX ORDER — CLONAZEPAM 0.25 MG/1
0.5 TABLET, ORALLY DISINTEGRATING ORAL EVERY 12 HOURS PRN
Status: DISCONTINUED | OUTPATIENT
Start: 2022-11-02 | End: 2022-11-06 | Stop reason: HOSPADM

## 2022-11-02 RX ORDER — FENTANYL CITRATE 50 UG/ML
50 INJECTION, SOLUTION INTRAMUSCULAR; INTRAVENOUS ONCE
Status: DISCONTINUED | OUTPATIENT
Start: 2022-11-02 | End: 2022-11-02

## 2022-11-02 RX ORDER — LIDOCAINE HYDROCHLORIDE 10 MG/ML
INJECTION, SOLUTION EPIDURAL; INFILTRATION; INTRACAUDAL; PERINEURAL PRN
Status: DISCONTINUED | OUTPATIENT
Start: 2022-11-02 | End: 2022-11-02 | Stop reason: SDUPTHER

## 2022-11-02 RX ORDER — LABETALOL HYDROCHLORIDE 5 MG/ML
10 INJECTION, SOLUTION INTRAVENOUS ONCE
Status: COMPLETED | OUTPATIENT
Start: 2022-11-02 | End: 2022-11-02

## 2022-11-02 RX ORDER — ONDANSETRON 2 MG/ML
INJECTION INTRAMUSCULAR; INTRAVENOUS PRN
Status: DISCONTINUED | OUTPATIENT
Start: 2022-11-02 | End: 2022-11-02 | Stop reason: SDUPTHER

## 2022-11-02 RX ORDER — PROMETHAZINE HYDROCHLORIDE 25 MG/ML
6.25 INJECTION, SOLUTION INTRAMUSCULAR; INTRAVENOUS ONCE
Status: COMPLETED | OUTPATIENT
Start: 2022-11-02 | End: 2022-11-02

## 2022-11-02 RX ORDER — SUCRALFATE ORAL 1 G/10ML
1 SUSPENSION ORAL EVERY 6 HOURS SCHEDULED
Status: DISCONTINUED | OUTPATIENT
Start: 2022-11-02 | End: 2022-11-06 | Stop reason: HOSPADM

## 2022-11-02 RX ORDER — ENALAPRILAT 2.5 MG/2ML
1.25 INJECTION INTRAVENOUS ONCE
Status: COMPLETED | OUTPATIENT
Start: 2022-11-03 | End: 2022-11-03

## 2022-11-02 RX ORDER — LORAZEPAM 2 MG/ML
0.5 INJECTION INTRAMUSCULAR ONCE
Status: COMPLETED | OUTPATIENT
Start: 2022-11-02 | End: 2022-11-02

## 2022-11-02 RX ORDER — PROPOFOL 10 MG/ML
INJECTION, EMULSION INTRAVENOUS PRN
Status: DISCONTINUED | OUTPATIENT
Start: 2022-11-02 | End: 2022-11-02 | Stop reason: SDUPTHER

## 2022-11-02 RX ADMIN — PROMETHAZINE HYDROCHLORIDE 6.25 MG: 25 INJECTION INTRAMUSCULAR; INTRAVENOUS at 11:08

## 2022-11-02 RX ADMIN — SODIUM CHLORIDE, PRESERVATIVE FREE 10 ML: 5 INJECTION INTRAVENOUS at 20:07

## 2022-11-02 RX ADMIN — LORAZEPAM 0.5 MG: 2 INJECTION INTRAMUSCULAR; INTRAVENOUS at 00:21

## 2022-11-02 RX ADMIN — METOCLOPRAMIDE 10 MG: 5 INJECTION, SOLUTION INTRAMUSCULAR; INTRAVENOUS at 14:43

## 2022-11-02 RX ADMIN — SODIUM CHLORIDE: 9 INJECTION, SOLUTION INTRAVENOUS at 12:57

## 2022-11-02 RX ADMIN — METOCLOPRAMIDE 10 MG: 5 INJECTION, SOLUTION INTRAMUSCULAR; INTRAVENOUS at 07:48

## 2022-11-02 RX ADMIN — Medication 10 MG: at 04:14

## 2022-11-02 RX ADMIN — Medication 10 MG: at 20:06

## 2022-11-02 RX ADMIN — ONDANSETRON 4 MG: 2 INJECTION INTRAMUSCULAR; INTRAVENOUS at 23:00

## 2022-11-02 RX ADMIN — SODIUM CHLORIDE: 9 INJECTION, SOLUTION INTRAVENOUS at 12:10

## 2022-11-02 RX ADMIN — PROMETHAZINE HYDROCHLORIDE 6.25 MG: 25 INJECTION INTRAMUSCULAR; INTRAVENOUS at 14:44

## 2022-11-02 RX ADMIN — PROPOFOL 50 MG: 10 INJECTION, EMULSION INTRAVENOUS at 13:20

## 2022-11-02 RX ADMIN — LORAZEPAM 0.5 MG: 2 INJECTION INTRAMUSCULAR; INTRAVENOUS at 16:46

## 2022-11-02 RX ADMIN — CEFTRIAXONE SODIUM 1000 MG: 10 INJECTION, POWDER, FOR SOLUTION INTRAVENOUS at 07:51

## 2022-11-02 RX ADMIN — PROPOFOL 50 MG: 10 INJECTION, EMULSION INTRAVENOUS at 13:19

## 2022-11-02 RX ADMIN — METOCLOPRAMIDE 10 MG: 5 INJECTION, SOLUTION INTRAMUSCULAR; INTRAVENOUS at 20:06

## 2022-11-02 RX ADMIN — ONDANSETRON 4 MG: 2 INJECTION INTRAMUSCULAR; INTRAVENOUS at 13:02

## 2022-11-02 RX ADMIN — SODIUM CHLORIDE 1000 ML: 9 INJECTION, SOLUTION INTRAVENOUS at 11:10

## 2022-11-02 RX ADMIN — SODIUM CHLORIDE, PRESERVATIVE FREE 10 ML: 5 INJECTION INTRAVENOUS at 07:59

## 2022-11-02 RX ADMIN — SODIUM CHLORIDE: 9 INJECTION, SOLUTION INTRAVENOUS at 07:46

## 2022-11-02 RX ADMIN — SODIUM CHLORIDE, PRESERVATIVE FREE 40 MG: 5 INJECTION INTRAVENOUS at 20:06

## 2022-11-02 RX ADMIN — SODIUM CHLORIDE, PRESERVATIVE FREE 40 MG: 5 INJECTION INTRAVENOUS at 07:47

## 2022-11-02 RX ADMIN — Medication 10 MG: at 11:24

## 2022-11-02 RX ADMIN — PROMETHAZINE HYDROCHLORIDE 6.25 MG: 25 INJECTION INTRAMUSCULAR; INTRAVENOUS at 07:50

## 2022-11-02 RX ADMIN — LIDOCAINE HYDROCHLORIDE 50 MG: 10 INJECTION, SOLUTION EPIDURAL; INFILTRATION; INTRACAUDAL; PERINEURAL at 13:19

## 2022-11-02 RX ADMIN — HYDROMORPHONE HYDROCHLORIDE 0.5 MG: 1 INJECTION, SOLUTION INTRAMUSCULAR; INTRAVENOUS; SUBCUTANEOUS at 23:20

## 2022-11-02 RX ADMIN — Medication 10 MG: at 16:46

## 2022-11-02 RX ADMIN — SODIUM CHLORIDE: 9 INJECTION, SOLUTION INTRAVENOUS at 13:13

## 2022-11-02 RX ADMIN — CLONAZEPAM 0.5 MG: 0.25 TABLET, ORALLY DISINTEGRATING ORAL at 20:06

## 2022-11-02 RX ADMIN — ONDANSETRON 4 MG: 2 INJECTION INTRAMUSCULAR; INTRAVENOUS at 16:14

## 2022-11-02 RX ADMIN — PROMETHAZINE HYDROCHLORIDE 6.25 MG: 25 INJECTION INTRAMUSCULAR; INTRAVENOUS at 20:55

## 2022-11-02 RX ADMIN — ONDANSETRON 4 MG: 2 INJECTION INTRAMUSCULAR; INTRAVENOUS at 06:31

## 2022-11-02 RX ADMIN — METOCLOPRAMIDE 10 MG: 5 INJECTION, SOLUTION INTRAMUSCULAR; INTRAVENOUS at 04:13

## 2022-11-02 RX ADMIN — HYDROMORPHONE HYDROCHLORIDE 0.5 MG: 1 INJECTION, SOLUTION INTRAMUSCULAR; INTRAVENOUS; SUBCUTANEOUS at 19:04

## 2022-11-02 ASSESSMENT — PAIN - FUNCTIONAL ASSESSMENT
PAIN_FUNCTIONAL_ASSESSMENT: ACTIVITIES ARE NOT PREVENTED
PAIN_FUNCTIONAL_ASSESSMENT: 0-10

## 2022-11-02 ASSESSMENT — PAIN DESCRIPTION - PAIN TYPE
TYPE: ACUTE PAIN

## 2022-11-02 ASSESSMENT — PAIN DESCRIPTION - ORIENTATION
ORIENTATION: RIGHT;LEFT;UPPER;MID
ORIENTATION: RIGHT;LEFT;MID;UPPER
ORIENTATION: LEFT;RIGHT

## 2022-11-02 ASSESSMENT — ENCOUNTER SYMPTOMS
ALLERGIC/IMMUNOLOGIC NEGATIVE: 1
VOMITING: 1
DIARRHEA: 1
ABDOMINAL PAIN: 1
RESPIRATORY NEGATIVE: 1
NAUSEA: 1
BLOOD IN STOOL: 0
ANAL BLEEDING: 0

## 2022-11-02 ASSESSMENT — PAIN DESCRIPTION - FREQUENCY
FREQUENCY: CONTINUOUS
FREQUENCY: CONTINUOUS

## 2022-11-02 ASSESSMENT — PAIN DESCRIPTION - ONSET
ONSET: ON-GOING

## 2022-11-02 ASSESSMENT — PAIN SCALES - GENERAL
PAINLEVEL_OUTOF10: 6
PAINLEVEL_OUTOF10: 5
PAINLEVEL_OUTOF10: 7
PAINLEVEL_OUTOF10: 9
PAINLEVEL_OUTOF10: 5
PAINLEVEL_OUTOF10: 7
PAINLEVEL_OUTOF10: 4

## 2022-11-02 ASSESSMENT — PAIN DESCRIPTION - LOCATION
LOCATION: ABDOMEN

## 2022-11-02 ASSESSMENT — PAIN DESCRIPTION - DESCRIPTORS
DESCRIPTORS: ACHING;SQUEEZING;DISCOMFORT
DESCRIPTORS: DISCOMFORT;ACHING;SQUEEZING
DESCRIPTORS: ACHING;SQUEEZING;DISCOMFORT

## 2022-11-02 NOTE — CONSULTS
Negative. Previous GI history: Patient denies having any EGD/colonoscopy in the past.    PAST MEDICAL HISTORY:  Past Medical History:   Diagnosis Date    Diabetes mellitus ([facility] [address]     type 1    Hypertension      Past Surgical History:   Procedure Laterality Date    TUBAL LIGATION  11/2018       ALLERGIES:  Allergies   Allergen Reactions    Cinnamon Hives    Morphine Other (See Comments)     Hallucinations         HOME MEDICATIONS:  Prior to Admission medications    Medication Sig Start Date End Date Taking? Authorizing Provider   hydrOXYzine HCl (ATARAX) 25 MG tablet Take 25 mg by mouth 3 times daily as needed for Anxiety or Itching   Yes Historical Provider, MD   LOSARTAN POTASSIUM PO Take 50 mg by mouth daily    Historical Provider, MD   amLODIPine (NORVASC) 10 MG tablet Take 10 mg by mouth every evening  Patient not taking: Reported on 11/1/2022    Historical Provider, MD   gabapentin (NEURONTIN) 300 MG capsule Take 300 mg by mouth 3 times daily as needed.     Historical Provider, MD   ATORVASTATIN CALCIUM PO Take 10 mg by mouth every evening    Historical Provider, MD   cetirizine (ZYRTEC) 10 MG tablet Take 10 mg by mouth every evening    Historical Provider, MD   vitamin D (CHOLECALCIFEROL) 1000 UNIT TABS tablet Take 1,000 Units by mouth once a week Indications: on sundays  Patient not taking: Reported on 10/30/2022    Historical Provider, MD   Multiple Vitamins-Minerals (THERAPEUTIC MULTIVITAMIN-MINERALS) tablet Take 1 tablet by mouth daily  Patient not taking: Reported on 10/30/2022    Historical Provider, MD   ondansetron (ZOFRAN ODT) 4 MG disintegrating tablet Take 1 tablet by mouth every 8 hours as needed for Nausea or Vomiting  Patient not taking: No sig reported 1/26/17   Jonna Servin MD   AMITRIPTYLINE HCL PO Take 10 mg by mouth daily    Historical Provider, MD   Insulin Infusion Pump BERTA by Does not apply route    Historical Provider, MD   insulin aspart (NOVOLOG) 100 UNIT/ML injection vial Inject into the skin 3 times daily (before meals) Sliding scale    Historical Provider, MD     .  CURRENT MEDICATIONS:  Scheduled Meds:   sodium chloride  1,000 mL IntraVENous Once    sucralfate  1 g Oral 4 times per day    amLODIPine  10 mg Oral QPM    sodium chloride flush  5-40 mL IntraVENous 2 times per day    heparin (porcine)  5,000 Units SubCUTAneous 3 times per day    insulin lispro  0-4 Units SubCUTAneous TID WC    insulin lispro  0-4 Units SubCUTAneous Nightly    metoclopramide  10 mg IntraVENous Q6H    pantoprazole (PROTONIX) 40 mg injection  40 mg IntraVENous BID     . Continuous Infusions:   sodium chloride      dextrose      sodium chloride 100 mL/hr at 11/02/22 0746     . PRN Meds:promethazine, clonazePAM, sodium chloride flush, sodium chloride, ondansetron **OR** ondansetron, polyethylene glycol, acetaminophen **OR** acetaminophen, glucose, dextrose bolus **OR** dextrose bolus, glucagon (rDNA), dextrose, hydrOXYzine HCl, labetalol, dicyclomine  . SOCIAL HISTORY:     Social History     Socioeconomic History    Marital status: Single     Spouse name: Not on file    Number of children: Not on file    Years of education: Not on file    Highest education level: Not on file   Occupational History    Not on file   Tobacco Use    Smoking status: Never    Smokeless tobacco: Never   Substance and Sexual Activity    Alcohol use: No    Drug use: Never    Sexual activity: Not on file   Other Topics Concern    Not on file   Social History Narrative    Not on file     Social Determinants of Health     Financial Resource Strain: Not on file   Food Insecurity: Not on file   Transportation Needs: Not on file   Physical Activity: Not on file   Stress: Not on file   Social Connections: Not on file   Intimate Partner Violence: Not on file   Housing Stability: Not on file       FAMILY HISTORY:   History reviewed. No pertinent family history.         PHYSICAL EXAM:    BP (!) 147/109   Pulse 98   Temp 98.1 °F (36.7 °C) (Oral)   Resp 16   Ht 5' 1\" (1.549 m)   Wt 207 lb 14.3 oz (94.3 kg)   SpO2 98%   BMI 39.28 kg/m²   . TMAX[24]    Physical Exam  Constitutional:       Appearance: She is obese. She is ill-appearing. HENT:      Head: Normocephalic. Nose: Nose normal.   Cardiovascular:      Rate and Rhythm: Normal rate and regular rhythm. Pulses: Normal pulses. Heart sounds: Normal heart sounds. Pulmonary:      Effort: Pulmonary effort is normal.      Breath sounds: Normal breath sounds. Abdominal:      General: Abdomen is flat. Tenderness: There is abdominal tenderness. There is right CVA tenderness and left CVA tenderness. Musculoskeletal:         General: Normal range of motion. Cervical back: Normal range of motion. Skin:     General: Skin is warm. Neurological:      General: No focal deficit present. Mental Status: She is alert and oriented to person, place, and time. Mental status is at baseline. LABS and IMAGING:     Hemotological labs:   ANEMIA STUDIES  Recent Labs     11/01/22 2108   [id number]   FOLATE 15.9       CBC  Recent Labs     10/30/22  1313 10/31/22  0559 11/01/22  1126 11/02/22  0638   WBC 10.6 12.0* 10.8 11.1   HGB 10.5* 9.4* 9.9* 8.7*   MCV 83.2 85.8 83.4 84.1   RDW 13.3 13.5 13.6 13.9    305 329 244       PT/INR  No results for input(s): PROTIME, INR in the last 72 hours.     BMP  Recent Labs     11/01/22  1126 11/01/22 2108 11/02/22  0638    138 142   K 3.5* 3.5* 3.6*    104 109*   CO2 16* 16* 18*   BUN 16 16 17   CREATININE 1.75* 1.68* 1.90*   GLUCOSE 154* 128* 136*   CALCIUM 9.0 8.4* 8.1*       LIVER WORK UP  Hepatitis Functional Panel:  Recent Labs     11/01/22  1126   ALKPHOS 72   ALT 11   AST 23   PROT 7.7   BILITOT 0.5   LABALBU 3.8       AMYLASE/LIPASE/AMMONIA  Recent Labs     10/30/22  1313 11/01/22  1126   LIPASE 34 19       Acute Hepatitis Panel   No results found for: HEPBSAG, HEPCAB, HEPBIGM, HEPAIGM    HCV Genotype   No results found for: HEPATITISCGENOTYPE    HCV Quantitative   No results found for: HCVQNT    Metabolic work up:  Ceruloplasmin  AA work up:  Alpha 1 antitrypsin   No results found for: A1A  AMA:  No results found for: MITOAB    ASMA:  No results found for: SMOOTHMUSCAB    ANCA:    FRANCISCO:  No results found for: FRANCISCO    Anti - Liver/Kidney Ab:  No results found for: LIVER-KIDNEYMICROSOMALAB    Ceruloplasmin:  No results found for: CERULOPLSM    Celiac panel:  No results found for: TISSTRNTIIGG, TTGIGA, IGA    Cancer Markers:  CEA:    No results for input(s): CEA in the last 72 hours. Ca 125:   No results for input(s):  in the last 72 hours. Ca 19-9:     Invalid input(s):   AFP: No results for input(s): AFP in the last 72 hours. ASSESSMENT and PLAN:     -Was recently evaluated by GI for similar symptoms, recommended conservative treatment with Protonix twice daily.  -We will plan for a EGD. Keep the Patient NPO  -Further Plans to follow after EGD    This plan was formulated in collaboration with Dr. Troy Kaiser  Thank you for allowing us to participate in the care of your patient.     Electronically signed by:  Ary Duke MD  Internal Medicine Resident PGY-1  [facility]  [phone]  11/2/2022    10:18 AM

## 2022-11-02 NOTE — FLOWSHEET NOTE
Report called to Annika MYERS on CAR2 overseeing care of this patient while pt's nurse Bello Alfaro RN on [facility] is at lunch. Questions addressed and answered.

## 2022-11-02 NOTE — ACP (ADVANCE CARE PLANNING)
Advance Care Planning     Advance Care Planning Inpatient Note  [facility] Department    Today's Date: 11/1/2022  Unit: [facility]- [facility]    Received request from Other: Nurse . Upon review of chart and communication with care team, patient's decision making abilities are not in question. . Patient was/were present in the room during visit. Goals of ACP Conversation:  Discuss advance care planning documents    Health Care Decision Makers:     No healthcare decision makers have been documented. Click here to complete [employer] including selection of the Healthcare Decision Maker Relationship (ie \"Primary\")  Summary:  No Decision Maker named by patient at this time    Advance Care Planning Documents (Patient Wishes):  None     Assessment:  Patient's decision making ability is not in question. Interventions:  Provided education on documents for clarity and greater understanding  Discussed and provided education on state decision maker hierarchy  Encouraged ongoing ACP conversation with future decision makers and loved ones    Care Preferences Communicated:   No    Outcomes/Plan:  Patient will review ACP documentation with agents.      Electronically signed by Cathy Barger on 11/1/2022 at 10:27 PM

## 2022-11-02 NOTE — PLAN OF CARE
Spoke with [address] regarding giving the patient Dilaudid with a morphine allergy. Patient has received Dilaudid in the past and tolerated it well. Patient states Dilaudid helps with her pain and nausea both of which are not controlled at this time. Patient's blood pressure is elevated. We will continue to monitor blood pressure after patient receives Dilaudid as her blood pressure could be elevated secondary to the abdominal pain she is experiencing as well as not being able to take her antihypertensive medication due to nausea and vomiting. Patient states her abdominal pain is diffuse and rates it 9 out of 10 on a pain scale. Patient did have a KUB yesterday that showed no acute abnormalities. Ultrasound of the right upper quadrant today showed no acute abnormalities. If blood pressure remains uncontrolled after pain is controlled will try Cardizem pushes. If abdominal pain is unrelieved will consider repeat CT scan of the abdomen and pelvis.       Lou Reynolds MD

## 2022-11-02 NOTE — ANESTHESIA POSTPROCEDURE EVALUATION
Department of Anesthesiology  Postprocedure Note    Patient: Dot Bailey  MRN: [id number]  YOB: 1985  Date of evaluation: 11/2/2022      Procedure Summary     Date: 11/02/22 Room / Location: [address] [place] [address]    Anesthesia Start: 2069 Anesthesia Stop: 7811    Procedure: EGD BIOPSY Diagnosis:       Hematemesis without nausea      (HEMATEMESIS)    Surgeons: Brown Camp MD Responsible Provider: Derrick Sprague MD    Anesthesia Type: MAC ASA Status: 2          Anesthesia Type: No value filed.     Donte Phase I: Donte Score: 10    Donte Phase II: Donte Score: 10      Anesthesia Post Evaluation    Patient location during evaluation: PACU  Patient participation: complete - patient participated  Level of consciousness: awake and alert  Pain score: 2  Airway patency: patent  Nausea & Vomiting: no nausea and no vomiting  Complications: no  Cardiovascular status: hemodynamically stable  Respiratory status: acceptable  Hydration status: euvolemic

## 2022-11-02 NOTE — PLAN OF CARE
Problem: Discharge Planning  Goal: Discharge to home or other facility with appropriate resources  Recent Flowsheet Documentation  Taken 11/2/2022 0800 by John Flores RN  Discharge to home or other facility with appropriate resources: Identify barriers to discharge with patient and caregiver     Problem: Chronic Conditions and Co-morbidities  Goal: Patient's chronic conditions and co-morbidity symptoms are monitored and maintained or improved  Recent Flowsheet Documentation  Taken 11/2/2022 0800 by Shreya Serrano 34 - Patient's Chronic Conditions and Co-Morbidity Symptoms are Monitored and Maintained or Improved: Monitor and assess patient's chronic conditions and comorbid symptoms for stability, deterioration, or improvement

## 2022-11-02 NOTE — OP NOTE
EGD      Patient:   Shobha Mata    :    1985    Facility:   [facility]   Referring/PCP: No primary care provider on file. Procedure:   Esophagogastroduodenoscopy with biopsy  Date:     2022   Endoscopist:  Azul Manuel MD, [facility]    Indication:   Hematemesis    Postprocedure diagnosis:   Mild esophagitis, hiatal hernia, esophageal nodule    Anesthesia:  MAC    Complications: None    EBL: None from the procedure    Specimen: Sent to the lab    Description of Procedure:  Informed consent was obtained from the patient after explanation of the procedure including indications, description of the procedure,  benefits and possible risks and complications of the procedure, and alternatives. Questions were answered. The patient's history was reviewed and a directed physical examination was performed prior to the procedure. Patient was monitored throughout the procedure with pulse oximetry and periodic assessment of vital signs. Patient was sedated as noted above. With the patient in the left lateral decubitus position, the Olympus videoendoscope was placed in the patient's mouth and under direct visualization passed into the esophagus. Visualization of the esophagus, stomach, and duodenum was performed during both introduction and withdrawal of the endoscope and retroflexed view of the proximal stomach was obtained. The scope was passed to the 2nd portion of the duodenum. The patient tolerated the procedure well and was taken to the recovery area in good condition. Findings[de-identified]   Esophagus: The esophagus had grade B reflux esophagitis in the lower esophagus. At the GE junction there was about 12 mm nodule which could be reactive from reflux disease. This was biopsied with cold biopsy forceps. The rest of the esophagus was otherwise normal.  Stomach: Stomach had small sliding hiatal hernia.   Stomach was otherwise normal  Duodenum: normal    Recommendations: PPI twice dailY  Start full liquid diet advance as tolerated. Repeat upper endoscopy in 4 to 6 weeks to check healing of the esophageal nodule.     Electronically signed by Azul Manuel MD, FACG  on 11/2/2022 at 1:24 PM

## 2022-11-02 NOTE — PROGRESS NOTES
[facility]  Internal Medicine Teaching Residency Program  Inpatient Daily Progress Note  ______________________________________________________________________________    Patient: Laurel Adams  YOB: 1985   Z:[id number]    Acct: [de-identified]     Room: 2017/2017-01  Admit date: 11/1/2022  Today's date: 11/02/22  Number of days in the hospital: 1    SUBJECTIVE   Admitting Diagnosis: Hematemesis  CC: Bloody emesis; pain abdominal  Pt examined at bedside. Chart & results reviewed. -Patient awake, alert, AAO X4  -Patient still complaining of nausea, vomiting, and abdominal pain  -She reports that abdominal pain is 7/10 in intensity, diffuse, aching/crushing in character  -Patient is breathing comfortably on room air and is hemodynamically stable  -She did have a an episode of hematemesis yesterday  -The patient had bowel movement in the morning and but there was no bleeding in stool  -During examination, the patient had diffuse abdominal tenderness      ROS:  Constitutional:  negative for chills, fevers, sweats  Respiratory:  negative for cough, dyspnea on exertion, hemoptysis, shortness of breath, wheezing  Cardiovascular:  negative for chest pain, chest pressure/discomfort, lower extremity edema, palpitations  Gastrointestinal:  negative for abdominal pain, constipation, diarrhea, nausea, vomiting  Neurological:  negative for dizziness, headache  BRIEF HISTORY     The patient is a pleasant 44 y.o. female presents with a chief complaint of nausea, hematemesis, and abdominal pain. The patient was recently admitted to the hospital with the same complaints which she had episodes of nonbloody vomiting in the morning following by hematemesis and abdominal pain. During the previous admission, her hemoglobin was 10.5, creatinine was 1.45 which was baseline, lipase was normal, occult blood was positive. CT abdomen showed pyelonephritis/renal infarct.   She was started on IV ceftriaxone. She was also given IV PPI twice daily due to hematemesis likely secondary to Delmis-Suarez tear. GI was consulted who recommended conservative treatment and not EGD with Protonix 40 mg IV twice daily for 8 weeks and change to p.o. when the patient tolerates. After this, the patient left AMA. The patient presented to ED today with the similar complaints with increased intensity of abdominal pain and vomiting frequency. During evaluation, she had hematemesis with coffee-ground component/dark digested blood component in her vomitus. She also complains of abdominal and bilateral flank pain. The patient reports that she was tolerating liquids yesterday but today, she woke up and started throwing up uncontrollably. She also complains of chills. During examination, the patient had diffuse abdominal tenderness. The patient was again started on ceftriaxone IV 1 dose. OBJECTIVE     Vital Signs:  BP (!) 147/109   Pulse 98   Temp 98.1 °F (36.7 °C) (Oral)   Resp 16   Ht 5' 1\" (1.549 m)   Wt 207 lb 14.3 oz (94.3 kg)   SpO2 98%   BMI 39.28 kg/m²     Temp (24hrs), Av.3 °F (36.8 °C), Min:98 °F (36.7 °C), Max:98.7 °F (37.1 °C)    In: 50   Out: -     Physical Exam:  Constitutional: This is a well developed, well nourished, 35-39.9 - Obesity Grade II 44y.o. year old female who is alert, oriented, cooperative and in no apparent distress. Head:normocephalic and atraumatic. EENT:  PERRLA. No conjunctival injections. Septum was midline, mucosa was without erythema, exudates or cobblestoning. No thrush was noted. Neck: Supple without thyromegaly. No elevated JVP. Trachea was midline. Respiratory: Chest was symmetrical without dullness to percussion. Breath sounds bilaterally were clear to auscultation. There were no wheezes, rhonchi or rales. Cardiovascular: Regular without murmur, clicks, gallops or rubs. Abdomen: Abdominal tenderness noted.   Lymphatic: No lymphadenopathy. Musculoskeletal: Normal curvature of the spine. No gross muscle weakness. Extremities:  No lower extremity edema, ulcerations, tenderness, varicosities or erythema. Muscle size, tone and strength are normal.  No involuntary movements are noted. Medications:  Scheduled Medications:    amLODIPine  10 mg Oral QPM    sodium chloride flush  5-40 mL IntraVENous 2 times per day    heparin (porcine)  5,000 Units SubCUTAneous 3 times per day    insulin lispro  0-4 Units SubCUTAneous TID WC    insulin lispro  0-4 Units SubCUTAneous Nightly    metoclopramide  10 mg IntraVENous Q6H    pantoprazole (PROTONIX) 40 mg injection  40 mg IntraVENous BID     Continuous Infusions:    sodium chloride      dextrose      sodium chloride 100 mL/hr at 11/02/22 0746     PRN Medicationspromethazine, 6.25 mg, Q4H PRN  sodium chloride flush, 5-40 mL, PRN  sodium chloride, , PRN  ondansetron, 4 mg, Q8H PRN   Or  ondansetron, 4 mg, Q6H PRN  polyethylene glycol, 17 g, Daily PRN  acetaminophen, 650 mg, Q6H PRN   Or  acetaminophen, 650 mg, Q6H PRN  glucose, 4 tablet, PRN  dextrose bolus, 125 mL, PRN   Or  dextrose bolus, 250 mL, PRN  glucagon (rDNA), 1 mg, PRN  dextrose, , Continuous PRN  hydrOXYzine HCl, 10 mg, TID PRN  labetalol, 10 mg, Q6H PRN  dicyclomine, 20 mg, 4x Daily PRN  ALPRAZolam, 0.5 mg, BID PRN        Diagnostic Labs:  CBC:   Recent Labs     10/31/22  0559 11/01/22  1126 11/02/22  0638   WBC 12.0* 10.8 11.1   RBC 3.37* 3.55* 3.09*   HGB 9.4* 9.9* 8.7*   HCT 28.9* 29.6* 26.0*   MCV 85.8 83.4 84.1   RDW 13.5 13.6 13.9    329 244     BMP:   Recent Labs     11/01/22  1126 11/01/22  2108 11/02/22  0638    138 142   K 3.5* 3.5* 3.6*    104 109*   CO2 16* 16* 18*   BUN 16 16 17   CREATININE 1.75* 1.68* 1.90*     BNP: No results for input(s): BNP in the last 72 hours. PT/INR: No results for input(s): PROTIME, INR in the last 72 hours. APTT: No results for input(s):  APTT in the last 72 hours.  CARDIAC ENZYMES: No results for input(s): CKMB, CKMBINDEX, TROPONINI in the last 72 hours. Invalid input(s): CKTOTAL;3  FASTING LIPID PANEL:No results found for: CHOL, HDL, TRIG  LIVER PROFILE:   Recent Labs     10/30/22  1313 11/01/22  1126   AST 22 23   ALT 10 11   BILITOT 0.5 0.5   ALKPHOS 80 72      MICROBIOLOGY:   Lab Results   Component Value Date/Time    CULTURE NO GROWTH 2 DAYS 10/30/2022 11:35 PM    CULTURE NO GROWTH 2 DAYS 10/30/2022 11:35 PM       Imaging:    CT ABDOMEN PELVIS WO CONTRAST Additional Contrast? None    Result Date: 10/30/2022  Symmetric stranding of fat around the kidneys. Correlate for clinical signs of pyelonephritis or renal infarct. Trace pelvic free fluid. XR ABDOMEN (KUB) (SINGLE AP VIEW)    Result Date: 11/1/2022  No acute abdominal abnormality by radiograph. XR CHEST PORTABLE    Result Date: 10/30/2022  No evidence of acute pulmonary abnormality seen. No radiographic evidence of intra-free air is seen beneath the diaphragm. ASSESSMENT & PLAN     ASSESSMENT / PLAN:     Hematemesis possibly secondary to Delmis-Suarez tear:  -Hemoglobin at presentation 9.9  -Previous chest x-ray and CT negative for any perforation  -GI was consulted on previous admission on 10/30/2022. Recommended conservative treatment with IV PPI Protonix 40 mg twice daily for 8 weeks with transition to oral when tolerating diet. We will continue with same recommendation.  -We will monitor hemoglobin. Pyelonephritis/acute pyelonephritis:  -Patient's CT on previous admission revealed pyelonephritis/renal infarct.  -Patient given ceftriaxone. -WBC count within normal limits  -Urine culture sent. We will follow-up with results     Abdominal pain/emesis possibly secondary to intestinal obstruction:  -X-ray shows no acute abdominal abnormality  -We will follow-up on right upper quadrant USG.   10 Reglan 10 mg IV every 6 hours started.  -0.9% sodium chloride  mL/h started  -Patient was started with Phenergan every 4 hours as needed  -Sucralfate 1 g oral every 6 hours started. Type 1 diabetes mellitus:  -Patient on insulin pump at home  -Started on low-dose sliding scale insulin  -Hypoglycemia protocol in place  -Monitor for glucose    DVT ppx : Heparin  GI ppx: Protonix    PT/OT: Consulted  Discharge Planning / SW: In process    Barbi Martinez MD  Internal Medicine Resident, PGY-1  [facility]; [place], [place]  11/2/2022, 8:39 AM  Attending Physician Statement  I have discussed the care of [facility], including pertinent history and exam findings,  with the resident. I have seen and examined the patient and the key elements of all parts of the encounter have been performed by me. I agree with the assessment, plan and orders as documented by the resident with additions . Treatment plan Discussed with nursing staff in detail , all questions answered . Electronically signed by Perla Cortez MD on   11/2/22 at 1:20 PM EDT    Please note that this chart was generated using voice recognition Dragon dictation software. Although every effort was made to ensure the accuracy of this automated transcription, some errors in transcription may have occurred.

## 2022-11-02 NOTE — PROGRESS NOTES
2138- Notified Dr. Magdalene Nickerson, Internal med resident on call, that patient is complaining of stomach pain and back pain near kidneys. She stated to try Bentyl since it is ordered. This RN gave that PRN medication, but it was not effective. 2334 Notified Dr. Magdalene Nickerson again that patient is continually vomiting/dry heaving and still complaining of pain. Patient is shaking and tearful. BP is elevated. She ordered fentanyl and ativan IV. See orders.

## 2022-11-02 NOTE — ANESTHESIA PRE PROCEDURE
Department of Anesthesiology  Preprocedure Note       Name:  Tacos Coronel   Age:  44 y.o.  :  1985                                          MRN:  [id number]         Date:  2022      Surgeon: Jie Hedrick):  Leia Franklin MD    Procedure: Procedure(s):  EGD ESOPHAGOGASTRODUODENOSCOPY    Medications prior to admission:   Prior to Admission medications    Medication Sig Start Date End Date Taking? Authorizing Provider   hydrOXYzine HCl (ATARAX) 25 MG tablet Take 25 mg by mouth 3 times daily as needed for Anxiety or Itching   Yes Historical Provider, MD   LOSARTAN POTASSIUM PO Take 50 mg by mouth daily    Historical Provider, MD   amLODIPine (NORVASC) 10 MG tablet Take 10 mg by mouth every evening  Patient not taking: Reported on 2022    Historical Provider, MD   gabapentin (NEURONTIN) 300 MG capsule Take 300 mg by mouth 3 times daily as needed.     Historical Provider, MD   ATORVASTATIN CALCIUM PO Take 10 mg by mouth every evening    Historical Provider, MD   cetirizine (ZYRTEC) 10 MG tablet Take 10 mg by mouth every evening    Historical Provider, MD   vitamin D (CHOLECALCIFEROL) 1000 UNIT TABS tablet Take 1,000 Units by mouth once a week Indications: on sundays  Patient not taking: Reported on 10/30/2022    Historical Provider, MD   Multiple Vitamins-Minerals (THERAPEUTIC MULTIVITAMIN-MINERALS) tablet Take 1 tablet by mouth daily  Patient not taking: Reported on 10/30/2022    Historical Provider, MD   ondansetron (ZOFRAN ODT) 4 MG disintegrating tablet Take 1 tablet by mouth every 8 hours as needed for Nausea or Vomiting  Patient not taking: No sig reported 17   Isabel Sesay MD   AMITRIPTYLINE HCL PO Take 10 mg by mouth daily    Historical Provider, MD   Insulin Infusion Pump BERTA by Does not apply route    Historical Provider, MD   insulin aspart (NOVOLOG) 100 UNIT/ML injection vial Inject into the skin 3 times daily (before meals) Sliding scale    Historical Provider, MD       Current medications:    Current Facility-Administered Medications   Medication Dose Route Frequency Provider Last Rate Last Admin    [MAR Hold] promethazine (PHENERGAN) injection 6.25 mg  6.25 mg IntraMUSCular Q4H PRN Jessica Traylor MD   6.25 mg at 11/02/22 1108    [MAR Hold] sucralfate (CARAFATE) 1 GM/10ML suspension 1 g  1 g Oral 4 times per day Albert Oviedo MD        [facility] Hold] clonazePAM [facility]) disintegrating tablet 0.5 mg  0.5 mg Oral Q12H PRN Albert Oviedo MD        [facility] Hold] amLODIPine (NORVASC) tablet 10 mg  10 mg Oral QPM Albert Oviedo MD        [facility] Hold] sodium chloride flush 0.9 % injection 5-40 mL  5-40 mL IntraVENous 2 times per day Albert Oviedo MD   10 mL at 11/02/22 0759    [MAR Hold] sodium chloride flush 0.9 % injection 5-40 mL  5-40 mL IntraVENous PRN Albert Oviedo MD        [facility] Hold] 0.9 % sodium chloride infusion   IntraVENous PRN Albert Oviedo MD        [facility] Hold] ondansetron (ZOFRAN-ODT) disintegrating tablet 4 mg  4 mg Oral Q8H PRN Albert Oviedo MD        Or   Lou Erazo [facility] Hold] ondansetron [facility]) injection 4 mg  4 mg IntraVENous Q6H PRN Albert Oviedo MD   4 mg at 11/02/22 0631    [MAR Hold] polyethylene glycol (GLYCOLAX) packet 17 g  17 g Oral Daily PRN Albert Oviedo MD        [facility] Hold] acetaminophen (TYLENOL) tablet 650 mg  650 mg Oral Q6H PRN Albert Oviedo MD        Or    [facility] Hold] acetaminophen (TYLENOL) suppository 650 mg  650 mg Rectal Q6H PRN Albert Oviedo MD        [facility] Hold] heparin (porcine) injection 5,000 Units  5,000 Units SubCUTAneous 3 times per day Albert Oviedo MD        [facility] Hold] glucose chewable tablet 16 g  4 tablet Oral PRN Albert Oviedo MD        [facility] Hold] dextrose bolus 10% 125 mL  125 mL IntraVENous PRN Albert Oviedo MD   Stopped at 11/01/22 1800    Or    [MAR Hold] dextrose bolus 10% 250 mL  250 mL IntraVENous PRN Albert Oviedo MD        [facility] Hold] glucagon (rDNA) injection 1 mg  1 mg SubCUTAneous PRN Rio Bartlett MD        [facility] Hold] dextrose 10 % infusion   IntraVENous Continuous PRN Rio Bartlett MD        [facility] Hold] insulin lispro (HUMALOG) injection vial 0-4 Units  0-4 Units SubCUTAneous TID R [address] Rio Bartlett MD        [facility] Hold] insulin lispro (HUMALOG) injection vial 0-4 Units  0-4 Units SubCUTAneous Nightly Rio Bartlett MD        [facility] Hold] metoclopramide [facility]) injection 10 mg  10 mg IntraVENous Q6H Rio Bartlett MD   10 mg at 11/02/22 0748    [MAR Hold] 0.9 % sodium chloride infusion   IntraVENous Continuous Rio Bartlett  mL/hr at 11/02/22 1210 New Bag at 11/02/22 1210    [MAR Hold] hydrOXYzine HCl (ATARAX) tablet 10 mg  10 mg Oral TID PRN Rio Bartlett MD        [facility] Hold] pantoprazole (PROTONIX) 40 mg in sodium chloride (PF) 0.9 % 10 mL injection  40 mg IntraVENous BID Marlene Box MD   40 mg at 11/02/22 0747    [MAR Hold] labetalol (NORMODYNE;TRANDATE) injection 10 mg  10 mg IntraVENous Q6H PRN Rio Bartlett MD   10 mg at 11/02/22 1124    [MAR Hold] dicyclomine (BENTYL) injection 20 mg  20 mg IntraMUSCular 4x Daily PRN Rio Bartlett MD   20 mg at 11/01/22 2217       Allergies:     Allergies   Allergen Reactions    Cinnamon Hives    Morphine Other (See Comments)     Hallucinations         Problem List:    Patient Active Problem List   Diagnosis Code    Hematemesis K92.0    Type 1 diabetes mellitus with stage 3a chronic kidney disease (HCC) E10.22, N18.31    Pyelonephritis N12    Normocytic anemia D64.9    JORGE (acute kidney injury) ([facility] [address] N17.9    Insulin pump in place Z96.41    Proteinuria due to type 1 diabetes mellitus ([facility] [address] E10.29, R80.9    Anxiety F41.9    Anemia of chronic disease D63.8    Acute pyelonephritis N10       Past Medical History:        Diagnosis Date    Diabetes mellitus ([facility] [address]     type 1    Hypertension        Past Surgical History:        Procedure Laterality Date    TUBAL LIGATION  11/2018       Social History:    Social History     Tobacco Use    Smoking status: Never    Smokeless tobacco: Never   Substance Use Topics    Alcohol use: No                                Counseling given: Not Answered      Vital Signs (Current):   Vitals:    11/02/22 1118 11/02/22 1120 11/02/22 1209 11/02/22 1251   BP: (!) 193/108 (!) 188/110 (!) 198/111 (!) 177/84   Pulse: 94 94 94 88   Resp: 16   18   Temp: 36.8 °C (98.2 °F)   36.1 °C (97 °F)   TempSrc: Oral      SpO2: 98%   95%   Weight:    200 lb (90.7 kg)   Height:    5' 1\" (1.549 m)                                              BP Readings from Last 3 Encounters:   11/02/22 (!) 177/84   10/31/22 (!) 168/98   08/04/19 (!) 146/92       NPO Status: Time of last liquid consumption: 2300                        Time of last solid consumption: 2300                        Date of last liquid consumption: 11/01/22                        Date of last solid food consumption: 11/01/22    BMI:   Wt Readings from Last 3 Encounters:   11/02/22 200 lb (90.7 kg)   10/30/22 200 lb (90.7 kg)   08/04/19 200 lb (90.7 kg)     Body mass index is 37.79 kg/m².     CBC:   Lab Results   Component Value Date/Time    WBC 11.1 11/02/2022 06:38 AM    RBC 3.09 11/02/2022 06:38 AM    HGB 8.7 11/02/2022 06:38 AM    HCT 26.0 11/02/2022 06:38 AM    MCV 84.1 11/02/2022 06:38 AM    RDW 13.9 11/02/2022 06:38 AM     11/02/2022 06:38 AM       CMP:   Lab Results   Component Value Date/Time     11/02/2022 06:38 AM    K 3.6 11/02/2022 06:38 AM     11/02/2022 06:38 AM    CO2 18 11/02/2022 06:38 AM    BUN 17 11/02/2022 06:38 AM    CREATININE 1.90 11/02/2022 06:38 AM    GFRAA 41 08/04/2019 07:56 PM    AGRATIO 0.9 08/04/2019 07:56 PM    LABGLOM 34 11/02/2022 06:38 AM    GLUCOSE 136 11/02/2022 06:38 AM    PROT 7.7 11/01/2022 11:26 AM    CALCIUM 8.1 11/02/2022 06:38 AM    BILITOT 0.5 11/01/2022 11:26 AM    ALKPHOS 72 11/01/2022 11:26 AM    AST 23 11/01/2022 11:26 AM    ALT 11 11/01/2022 11:26 AM       POC Tests:   Recent Labs     11/02/22  1117   POCGLU 152*       Coags: No results found for: PROTIME, INR, APTT    HCG (If Applicable):   Lab Results   Component Value Date    PREGTESTUR Negative 05/13/2015        ABGs: No results found for: PHART, PO2ART, WMX3PHK, DEU6TYF, BEART, Q2OJQTDC     Type & Screen (If Applicable):  No results found for: LABABO, LABRH    Drug/Infectious Status (If Applicable):  No results found for: HIV, HEPCAB    COVID-19 Screening (If Applicable): No results found for: COVID19        Anesthesia Evaluation  Patient summary reviewed and Nursing notes reviewed no history of anesthetic complications:   Airway: Mallampati: III  TM distance: >3 FB   Neck ROM: full  Mouth opening: > = 3 FB   Dental:      Comment: Tongue ring removed    Pulmonary: breath sounds clear to auscultation  (+) sleep apnea: on noncompliant,                             Cardiovascular:  Exercise tolerance: good (>4 METS),   (+) hypertension:,       ECG reviewed  Rhythm: regular  Rate: normal                 ROS comment: Sinus tachycardia  Otherwise normal ECG     Neuro/Psych:               GI/Hepatic/Renal:   (+) morbid obesity         ROS comment:  complaining of nausea, vomiting, and abdominal pain    Pt has been dry heaving, last hematemesis last night 11/1. Endo/Other:    (+) DiabetesType I DM, using insulin, blood dyscrasia: anemia:., .                  ROS comment: hgb 8.7 Abdominal:             Vascular: Other Findings:           Anesthesia Plan      MAC     ASA 2       Induction: intravenous. Anesthetic plan and risks discussed with patient. Use of blood products discussed with patient whom consented to blood products.                      KARON Domingo CRNA   11/2/2022

## 2022-11-03 ENCOUNTER — APPOINTMENT (OUTPATIENT)
Dept: GENERAL RADIOLOGY | Age: 37
DRG: 368 | End: 2022-11-03
Payer: COMMERCIAL

## 2022-11-03 PROBLEM — E11.10 KETOACIDOSIS DUE TO DIABETES (HCC): Status: ACTIVE | Noted: 2022-11-03

## 2022-11-03 LAB
ABSOLUTE EOS #: <0.03 K/UL (ref 0–0.44)
ABSOLUTE IMMATURE GRANULOCYTE: 0.13 K/UL (ref 0–0.3)
ABSOLUTE LYMPH #: 1.05 K/UL (ref 1.1–3.7)
ABSOLUTE MONO #: 0.35 K/UL (ref 0.1–1.2)
ANION GAP SERPL CALCULATED.3IONS-SCNC: 14 MMOL/L (ref 9–17)
ANION GAP SERPL CALCULATED.3IONS-SCNC: 14 MMOL/L (ref 9–17)
ANION GAP SERPL CALCULATED.3IONS-SCNC: 20 MMOL/L (ref 9–17)
ANION GAP SERPL CALCULATED.3IONS-SCNC: 24 MMOL/L (ref 9–17)
BASOPHILS # BLD: 1 % (ref 0–2)
BASOPHILS ABSOLUTE: 0.08 K/UL (ref 0–0.2)
BETA-HYDROXYBUTYRATE: 7.22 MMOL/L (ref 0.02–0.27)
BUN BLDV-MCNC: 22 MG/DL (ref 6–20)
BUN BLDV-MCNC: 24 MG/DL (ref 6–20)
BUN BLDV-MCNC: 25 MG/DL (ref 6–20)
BUN BLDV-MCNC: 25 MG/DL (ref 6–20)
CALCIUM SERPL-MCNC: 7.4 MG/DL (ref 8.6–10.4)
CALCIUM SERPL-MCNC: 7.7 MG/DL (ref 8.6–10.4)
CALCIUM SERPL-MCNC: 7.7 MG/DL (ref 8.6–10.4)
CALCIUM SERPL-MCNC: 7.9 MG/DL (ref 8.6–10.4)
CARBOXYHEMOGLOBIN: 0.9 % (ref 0–5)
CARBOXYHEMOGLOBIN: 0.9 % (ref 0–5)
CHLORIDE BLD-SCNC: 101 MMOL/L (ref 98–107)
CHLORIDE BLD-SCNC: 105 MMOL/L (ref 98–107)
CHLORIDE BLD-SCNC: 106 MMOL/L (ref 98–107)
CHLORIDE BLD-SCNC: 99 MMOL/L (ref 98–107)
CO2: 10 MMOL/L (ref 20–31)
CO2: 15 MMOL/L (ref 20–31)
CO2: 15 MMOL/L (ref 20–31)
CO2: 9 MMOL/L (ref 20–31)
CREAT SERPL-MCNC: 1.84 MG/DL (ref 0.5–0.9)
CREAT SERPL-MCNC: 2.05 MG/DL (ref 0.5–0.9)
CREAT SERPL-MCNC: 2.15 MG/DL (ref 0.5–0.9)
CREAT SERPL-MCNC: 2.27 MG/DL (ref 0.5–0.9)
EOSINOPHILS RELATIVE PERCENT: 0 % (ref 1–4)
FIO2: ABNORMAL
FIO2: ABNORMAL
GFR SERPL CREATININE-BSD FRML MDRD: 28 ML/MIN/1.73M2
GFR SERPL CREATININE-BSD FRML MDRD: 30 ML/MIN/1.73M2
GFR SERPL CREATININE-BSD FRML MDRD: 31 ML/MIN/1.73M2
GFR SERPL CREATININE-BSD FRML MDRD: 36 ML/MIN/1.73M2
GLUCOSE BLD-MCNC: 109 MG/DL (ref 65–105)
GLUCOSE BLD-MCNC: 114 MG/DL (ref 65–105)
GLUCOSE BLD-MCNC: 121 MG/DL (ref 65–105)
GLUCOSE BLD-MCNC: 121 MG/DL (ref 70–99)
GLUCOSE BLD-MCNC: 158 MG/DL (ref 65–105)
GLUCOSE BLD-MCNC: 158 MG/DL (ref 65–105)
GLUCOSE BLD-MCNC: 160 MG/DL (ref 65–105)
GLUCOSE BLD-MCNC: 191 MG/DL (ref 65–105)
GLUCOSE BLD-MCNC: 198 MG/DL (ref 70–99)
GLUCOSE BLD-MCNC: 220 MG/DL (ref 65–105)
GLUCOSE BLD-MCNC: 269 MG/DL (ref 65–105)
GLUCOSE BLD-MCNC: 291 MG/DL (ref 65–105)
GLUCOSE BLD-MCNC: 317 MG/DL (ref 70–99)
GLUCOSE BLD-MCNC: 338 MG/DL (ref 65–105)
GLUCOSE BLD-MCNC: 354 MG/DL (ref 65–105)
GLUCOSE BLD-MCNC: 381 MG/DL (ref 70–99)
GLUCOSE BLD-MCNC: 52 MG/DL (ref 65–105)
GLUCOSE BLD-MCNC: 70 MG/DL (ref 65–105)
GLUCOSE BLD-MCNC: 89 MG/DL (ref 65–105)
HCO3 VENOUS: 16.9 MMOL/L (ref 24–30)
HCO3 VENOUS: 9.1 MMOL/L (ref 24–30)
HCT VFR BLD CALC: 29.6 % (ref 36.3–47.1)
HEMOGLOBIN: 9.3 G/DL (ref 11.9–15.1)
IMMATURE GRANULOCYTES: 1 %
LIPASE: 18 U/L (ref 13–60)
LYMPHOCYTES # BLD: 7 % (ref 24–43)
MAGNESIUM: 1.8 MG/DL (ref 1.6–2.6)
MAGNESIUM: 1.8 MG/DL (ref 1.6–2.6)
MCH RBC QN AUTO: 27.5 PG (ref 25.2–33.5)
MCHC RBC AUTO-ENTMCNC: 31.4 G/DL (ref 28.4–34.8)
MCV RBC AUTO: 87.6 FL (ref 82.6–102.9)
MONOCYTES # BLD: 2 % (ref 3–12)
NEGATIVE BASE EXCESS, VEN: 16.7 MMOL/L (ref 0–2)
NEGATIVE BASE EXCESS, VEN: 8.4 MMOL/L (ref 0–2)
NRBC AUTOMATED: 0 PER 100 WBC
O2 SAT, VEN: 58.9 % (ref 60–85)
O2 SAT, VEN: 96 % (ref 60–85)
PATIENT TEMP: 37
PATIENT TEMP: 37
PCO2, VEN: 21.8 MM HG (ref 39–55)
PCO2, VEN: 35.9 MM HG (ref 39–55)
PDW BLD-RTO: 13.7 % (ref 11.8–14.4)
PH VENOUS: 7.24 (ref 7.32–7.42)
PH VENOUS: 7.29 (ref 7.32–7.42)
PHOSPHORUS: 2.5 MG/DL (ref 2.6–4.5)
PHOSPHORUS: 2.7 MG/DL (ref 2.6–4.5)
PLATELET # BLD: 267 K/UL (ref 138–453)
PMV BLD AUTO: 10.3 FL (ref 8.1–13.5)
PO2, VEN: 29.5 MM HG (ref 30–50)
PO2, VEN: 83.2 MM HG (ref 30–50)
POTASSIUM SERPL-SCNC: 3.5 MMOL/L (ref 3.7–5.3)
POTASSIUM SERPL-SCNC: 3.8 MMOL/L (ref 3.7–5.3)
POTASSIUM SERPL-SCNC: 4.3 MMOL/L (ref 3.7–5.3)
POTASSIUM SERPL-SCNC: 4.6 MMOL/L (ref 3.7–5.3)
RBC # BLD: 3.38 M/UL (ref 3.95–5.11)
SEG NEUTROPHILS: 89 % (ref 36–65)
SEGMENTED NEUTROPHILS ABSOLUTE COUNT: 13.42 K/UL (ref 1.5–8.1)
SERUM OSMOLALITY: 302 MOSM/KG (ref 275–295)
SODIUM BLD-SCNC: 131 MMOL/L (ref 135–144)
SODIUM BLD-SCNC: 132 MMOL/L (ref 135–144)
SODIUM BLD-SCNC: 134 MMOL/L (ref 135–144)
SODIUM BLD-SCNC: 135 MMOL/L (ref 135–144)
SURGICAL PATHOLOGY REPORT: NORMAL
WBC # BLD: 15 K/UL (ref 3.5–11.3)

## 2022-11-03 PROCEDURE — 6360000002 HC RX W HCPCS

## 2022-11-03 PROCEDURE — 84100 ASSAY OF PHOSPHORUS: CPT

## 2022-11-03 PROCEDURE — 83735 ASSAY OF MAGNESIUM: CPT

## 2022-11-03 PROCEDURE — 2500000003 HC RX 250 WO HCPCS: Performed by: INTERNAL MEDICINE

## 2022-11-03 PROCEDURE — 6370000000 HC RX 637 (ALT 250 FOR IP): Performed by: INTERNAL MEDICINE

## 2022-11-03 PROCEDURE — 2500000003 HC RX 250 WO HCPCS

## 2022-11-03 PROCEDURE — 2580000003 HC RX 258: Performed by: INTERNAL MEDICINE

## 2022-11-03 PROCEDURE — 36415 COLL VENOUS BLD VENIPUNCTURE: CPT

## 2022-11-03 PROCEDURE — 2060000000 HC ICU INTERMEDIATE R&B

## 2022-11-03 PROCEDURE — 83690 ASSAY OF LIPASE: CPT

## 2022-11-03 PROCEDURE — G0108 DIAB MANAGE TRN  PER INDIV: HCPCS

## 2022-11-03 PROCEDURE — 6360000002 HC RX W HCPCS: Performed by: INTERNAL MEDICINE

## 2022-11-03 PROCEDURE — 99232 SBSQ HOSP IP/OBS MODERATE 35: CPT | Performed by: INTERNAL MEDICINE

## 2022-11-03 PROCEDURE — 82010 KETONE BODYS QUAN: CPT

## 2022-11-03 PROCEDURE — 2580000003 HC RX 258

## 2022-11-03 PROCEDURE — 82947 ASSAY GLUCOSE BLOOD QUANT: CPT

## 2022-11-03 PROCEDURE — C9113 INJ PANTOPRAZOLE SODIUM, VIA: HCPCS | Performed by: INTERNAL MEDICINE

## 2022-11-03 PROCEDURE — 80048 BASIC METABOLIC PNL TOTAL CA: CPT

## 2022-11-03 PROCEDURE — 82805 BLOOD GASES W/O2 SATURATION: CPT

## 2022-11-03 PROCEDURE — 6370000000 HC RX 637 (ALT 250 FOR IP)

## 2022-11-03 PROCEDURE — 83930 ASSAY OF BLOOD OSMOLALITY: CPT

## 2022-11-03 PROCEDURE — A4216 STERILE WATER/SALINE, 10 ML: HCPCS | Performed by: INTERNAL MEDICINE

## 2022-11-03 PROCEDURE — 74018 RADEX ABDOMEN 1 VIEW: CPT

## 2022-11-03 PROCEDURE — 85025 COMPLETE CBC W/AUTO DIFF WBC: CPT

## 2022-11-03 RX ORDER — LABETALOL HYDROCHLORIDE 5 MG/ML
10 INJECTION, SOLUTION INTRAVENOUS ONCE
Status: COMPLETED | OUTPATIENT
Start: 2022-11-03 | End: 2022-11-03

## 2022-11-03 RX ORDER — SODIUM CHLORIDE 450 MG/100ML
INJECTION, SOLUTION INTRAVENOUS CONTINUOUS
Status: DISCONTINUED | OUTPATIENT
Start: 2022-11-03 | End: 2022-11-04

## 2022-11-03 RX ORDER — POTASSIUM CHLORIDE 7.45 MG/ML
10 INJECTION INTRAVENOUS PRN
Status: DISCONTINUED | OUTPATIENT
Start: 2022-11-03 | End: 2022-11-04

## 2022-11-03 RX ORDER — INSULIN LISPRO 100 [IU]/ML
0-4 INJECTION, SOLUTION INTRAVENOUS; SUBCUTANEOUS NIGHTLY
Status: DISCONTINUED | OUTPATIENT
Start: 2022-11-03 | End: 2022-11-03

## 2022-11-03 RX ORDER — DEXTROSE AND SODIUM CHLORIDE 5; .45 G/100ML; G/100ML
INJECTION, SOLUTION INTRAVENOUS CONTINUOUS PRN
Status: DISCONTINUED | OUTPATIENT
Start: 2022-11-03 | End: 2022-11-04

## 2022-11-03 RX ORDER — INSULIN LISPRO 100 [IU]/ML
0-8 INJECTION, SOLUTION INTRAVENOUS; SUBCUTANEOUS
Status: DISCONTINUED | OUTPATIENT
Start: 2022-11-03 | End: 2022-11-03

## 2022-11-03 RX ORDER — 0.9 % SODIUM CHLORIDE 0.9 %
1000 INTRAVENOUS SOLUTION INTRAVENOUS ONCE
Status: COMPLETED | OUTPATIENT
Start: 2022-11-03 | End: 2022-11-03

## 2022-11-03 RX ORDER — MAGNESIUM SULFATE 1 G/100ML
1000 INJECTION INTRAVENOUS PRN
Status: DISCONTINUED | OUTPATIENT
Start: 2022-11-03 | End: 2022-11-06 | Stop reason: HOSPADM

## 2022-11-03 RX ADMIN — POTASSIUM CHLORIDE 10 MEQ: 7.46 INJECTION, SOLUTION INTRAVENOUS at 22:04

## 2022-11-03 RX ADMIN — HEPARIN SODIUM 5000 UNITS: 5000 INJECTION INTRAVENOUS; SUBCUTANEOUS at 17:38

## 2022-11-03 RX ADMIN — SODIUM CHLORIDE, PRESERVATIVE FREE 40 MG: 5 INJECTION INTRAVENOUS at 09:30

## 2022-11-03 RX ADMIN — HYDROMORPHONE HYDROCHLORIDE 0.5 MG: 1 INJECTION, SOLUTION INTRAMUSCULAR; INTRAVENOUS; SUBCUTANEOUS at 14:25

## 2022-11-03 RX ADMIN — ENALAPRILAT 1.25 MG: 1.25 INJECTION INTRAVENOUS at 00:07

## 2022-11-03 RX ADMIN — HYDROMORPHONE HYDROCHLORIDE 0.5 MG: 1 INJECTION, SOLUTION INTRAMUSCULAR; INTRAVENOUS; SUBCUTANEOUS at 04:16

## 2022-11-03 RX ADMIN — METOCLOPRAMIDE 10 MG: 5 INJECTION, SOLUTION INTRAMUSCULAR; INTRAVENOUS at 17:38

## 2022-11-03 RX ADMIN — DEXTROSE MONOHYDRATE 125 ML: 100 INJECTION, SOLUTION INTRAVENOUS at 17:10

## 2022-11-03 RX ADMIN — SUCRALFATE ORAL 1 G: 1 SUSPENSION ORAL at 05:36

## 2022-11-03 RX ADMIN — DEXTROSE AND SODIUM CHLORIDE: 5; 450 INJECTION, SOLUTION INTRAVENOUS at 17:36

## 2022-11-03 RX ADMIN — SODIUM CHLORIDE 0.05 UNITS/KG/HR: 9 INJECTION, SOLUTION INTRAVENOUS at 16:00

## 2022-11-03 RX ADMIN — DICYCLOMINE HYDROCHLORIDE 20 MG: 10 INJECTION, SOLUTION INTRAMUSCULAR at 13:14

## 2022-11-03 RX ADMIN — SODIUM CHLORIDE 1000 ML: 9 INJECTION, SOLUTION INTRAVENOUS at 09:30

## 2022-11-03 RX ADMIN — SODIUM CHLORIDE, PRESERVATIVE FREE 40 MG: 5 INJECTION INTRAVENOUS at 20:41

## 2022-11-03 RX ADMIN — HYDROMORPHONE HYDROCHLORIDE 0.5 MG: 1 INJECTION, SOLUTION INTRAMUSCULAR; INTRAVENOUS; SUBCUTANEOUS at 18:31

## 2022-11-03 RX ADMIN — Medication 10 MG: at 09:35

## 2022-11-03 RX ADMIN — SODIUM CHLORIDE 0.15 UNITS/KG/HR: 9 INJECTION, SOLUTION INTRAVENOUS at 12:59

## 2022-11-03 RX ADMIN — METOCLOPRAMIDE 10 MG: 5 INJECTION, SOLUTION INTRAMUSCULAR; INTRAVENOUS at 09:30

## 2022-11-03 RX ADMIN — POTASSIUM CHLORIDE 10 MEQ: 7.46 INJECTION, SOLUTION INTRAVENOUS at 19:30

## 2022-11-03 RX ADMIN — SODIUM CHLORIDE 0.07 UNITS/KG/HR: 9 INJECTION, SOLUTION INTRAVENOUS at 14:16

## 2022-11-03 RX ADMIN — DEXTROSE AND SODIUM CHLORIDE: 5; 450 INJECTION, SOLUTION INTRAVENOUS at 23:15

## 2022-11-03 RX ADMIN — Medication 10 MG: at 03:18

## 2022-11-03 RX ADMIN — SODIUM BICARBONATE: 84 INJECTION, SOLUTION INTRAVENOUS at 10:39

## 2022-11-03 RX ADMIN — HYDROMORPHONE HYDROCHLORIDE 0.5 MG: 1 INJECTION, SOLUTION INTRAMUSCULAR; INTRAVENOUS; SUBCUTANEOUS at 22:49

## 2022-11-03 RX ADMIN — POTASSIUM CHLORIDE 10 MEQ: 7.46 INJECTION, SOLUTION INTRAVENOUS at 16:37

## 2022-11-03 RX ADMIN — METOCLOPRAMIDE 10 MG: 5 INJECTION, SOLUTION INTRAMUSCULAR; INTRAVENOUS at 02:53

## 2022-11-03 RX ADMIN — SODIUM CHLORIDE, PRESERVATIVE FREE 10 ML: 5 INJECTION INTRAVENOUS at 20:50

## 2022-11-03 RX ADMIN — SODIUM CHLORIDE 0.1 UNITS/KG/HR: 9 INJECTION, SOLUTION INTRAVENOUS at 11:52

## 2022-11-03 RX ADMIN — Medication 10 MG: at 00:44

## 2022-11-03 RX ADMIN — INSULIN LISPRO 3 UNITS: 100 INJECTION, SOLUTION INTRAVENOUS; SUBCUTANEOUS at 06:36

## 2022-11-03 RX ADMIN — ONDANSETRON 4 MG: 2 INJECTION INTRAMUSCULAR; INTRAVENOUS at 05:36

## 2022-11-03 RX ADMIN — PROMETHAZINE HYDROCHLORIDE 6.25 MG: 25 INJECTION INTRAMUSCULAR; INTRAVENOUS at 03:13

## 2022-11-03 RX ADMIN — METOCLOPRAMIDE 10 MG: 5 INJECTION, SOLUTION INTRAMUSCULAR; INTRAVENOUS at 20:41

## 2022-11-03 RX ADMIN — SODIUM CHLORIDE: 4.5 INJECTION, SOLUTION INTRAVENOUS at 11:46

## 2022-11-03 RX ADMIN — HYDROMORPHONE HYDROCHLORIDE 0.5 MG: 1 INJECTION, SOLUTION INTRAMUSCULAR; INTRAVENOUS; SUBCUTANEOUS at 10:34

## 2022-11-03 RX ADMIN — PROMETHAZINE HYDROCHLORIDE 6.25 MG: 25 INJECTION INTRAMUSCULAR; INTRAVENOUS at 11:35

## 2022-11-03 RX ADMIN — PROMETHAZINE HYDROCHLORIDE 6.25 MG: 25 INJECTION INTRAMUSCULAR; INTRAVENOUS at 17:27

## 2022-11-03 RX ADMIN — DEXTROSE AND SODIUM CHLORIDE: 5; 450 INJECTION, SOLUTION INTRAVENOUS at 14:24

## 2022-11-03 ASSESSMENT — ENCOUNTER SYMPTOMS
BLOOD IN STOOL: 0
VOMITING: 1
CHOKING: 0
COUGH: 1
ALLERGIC/IMMUNOLOGIC NEGATIVE: 1
CONSTIPATION: 0
ABDOMINAL PAIN: 1
EYES NEGATIVE: 1
SHORTNESS OF BREATH: 1
CHEST TIGHTNESS: 0
DIARRHEA: 0
TACHYPNEA: 1
NAUSEA: 1

## 2022-11-03 ASSESSMENT — PAIN DESCRIPTION - LOCATION
LOCATION: ABDOMEN

## 2022-11-03 ASSESSMENT — PAIN SCALES - GENERAL
PAINLEVEL_OUTOF10: 4
PAINLEVEL_OUTOF10: 5
PAINLEVEL_OUTOF10: 6
PAINLEVEL_OUTOF10: 7

## 2022-11-03 ASSESSMENT — PAIN DESCRIPTION - FREQUENCY
FREQUENCY: CONTINUOUS

## 2022-11-03 ASSESSMENT — PAIN DESCRIPTION - ONSET
ONSET: ON-GOING

## 2022-11-03 NOTE — PROGRESS NOTES
[facility]  Internal Medicine Teaching Residency Program  Inpatient Daily Progress Note  ______________________________________________________________________________    Patient: Keven Jimenez  YOB: 1985   RDS:[id number]    Acct: [de-identified]     Room: 2017/2017-01  Admit date: 11/1/2022  Today's date: 11/03/22  Number of days in the hospital: 2    SUBJECTIVE   Admitting Diagnosis: Hematemesis  CC: Bloody emesis abdominal pain    - Pt examined at bedside. Chart & results reviewed. Patient complains of chest pain and abdominal pain  Patient denies hematemesis  Patient reports that she is unable to tolerate her diet because of vomiting  Patient complains of nausea  Patient denies lightheadedness and headache  Patient also reports mild shortness of breath, patient saturating well on room air. Patient did get 3 x 0.5 Mg Dilaudid and one-time 25 MCG fentanyl IV, patient reports that the symptoms mentioned drugs does improve her symptom    Review of Systems   Constitutional:  Positive for appetite change and fatigue. Negative for chills, diaphoresis and fever. HENT: Negative. Eyes: Negative. Respiratory:  Positive for cough and shortness of breath. Negative for choking and chest tightness. Cardiovascular:  Positive for chest pain. Negative for palpitations and leg swelling. Gastrointestinal:  Positive for abdominal pain, nausea and vomiting. Negative for blood in stool, constipation and diarrhea. Endocrine: Negative. Genitourinary:  Negative for dysuria, enuresis and hematuria. Musculoskeletal: Negative. Skin: Negative. Allergic/Immunologic: Negative. Neurological:  Positive for weakness and light-headedness. Negative for tremors, syncope and headaches. Hematological: Negative. Psychiatric/Behavioral:  Negative for confusion and decreased concentration.       BRIEF HISTORY   The patient is a pleasant 44 y.o. female presents with a chief complaint of nausea, hematemesis, and abdominal pain. The patient was recently admitted to the hospital with the same complaints which she had episodes of nonbloody vomiting in the morning following by hematemesis and abdominal pain. During the previous admission, her hemoglobin was 10.5, creatinine was 1.45 which was baseline, lipase was normal, occult blood was positive. CT abdomen showed pyelonephritis/renal infarct. She was started on IV ceftriaxone. She was also given IV PPI twice daily due to hematemesis likely secondary to Delmis-Suarez tear. GI was consulted who recommended conservative treatment and not EGD with Protonix 40 mg IV twice daily for 8 weeks and change to p.o. when the patient tolerates. After this, the patient left AMA. The patient presented to ED today with the similar complaints with increased intensity of abdominal pain and vomiting frequency. During evaluation, she had hematemesis with coffee-ground component/dark digested blood component in her vomitus. She also complains of abdominal and bilateral flank pain. The patient reports that she was tolerating liquids yesterday but today, she woke up and started throwing up uncontrollably. She also complains of chills. During examination, the patient had diffuse abdominal tenderness. The patient was again started on ceftriaxone IV 1 dose. OBJECTIVE     Vital Signs:  BP (!) 172/94   Pulse (!) 102   Temp 98.1 °F (36.7 °C) (Oral)   Resp 21   Ht 5' 1\" (1.549 m)   Wt 208 lb 5.4 oz (94.5 kg)   LMP  (LMP Unknown)   SpO2 98%   BMI 39.36 kg/m²     Temp (24hrs), Av.1 °F (36.7 °C), Min:97 °F (36.1 °C), Max:98.5 °F (36.9 °C)    In: 100   Out: -     Physical Exam  Constitutional:       General: She is awake. Comments: Saturating well on room air   Cardiovascular:      Rate and Rhythm: Normal rate. Heart sounds: Normal heart sounds.    Pulmonary:      Effort: Pulmonary effort is normal.      Breath sounds: Normal breath sounds. Chest:      Chest wall: No swelling or tenderness. Abdominal:      Tenderness: There is generalized abdominal tenderness. There is no guarding or rebound. Neurological:      Mental Status: She is alert and oriented to person, place, and time. Mental status is at baseline. Psychiatric:         Behavior: Behavior is cooperative.      Medications:  Scheduled Medications:    sucralfate  1 g Oral 4 times per day    amLODIPine  10 mg Oral QPM    sodium chloride flush  5-40 mL IntraVENous 2 times per day    heparin (porcine)  5,000 Units SubCUTAneous 3 times per day    metoclopramide  10 mg IntraVENous Q6H    pantoprazole (PROTONIX) 40 mg injection  40 mg IntraVENous BID     Continuous Infusions:    dextrose 5 % and 0.45 % NaCl      insulin      sodium chloride      sodium chloride      dextrose       PRN MedicationsHYDROmorphone, 0.5 mg, Q4H PRN  dextrose bolus, 125 mL, PRN   Or  dextrose bolus, 250 mL, PRN  potassium chloride, 10 mEq, PRN  magnesium sulfate, 1,000 mg, PRN  sodium phosphate IVPB, 10 mmol, PRN   Or  sodium phosphate IVPB, 15 mmol, PRN   Or  sodium phosphate IVPB, 20 mmol, PRN  dextrose 5 % and 0.45 % NaCl, , Continuous PRN  promethazine, 6.25 mg, Q4H PRN  clonazePAM, 0.5 mg, Q12H PRN  sodium chloride flush, 5-40 mL, PRN  sodium chloride, , PRN  polyethylene glycol, 17 g, Daily PRN  glucose, 4 tablet, PRN  dextrose bolus, 125 mL, PRN   Or  dextrose bolus, 250 mL, PRN  glucagon (rDNA), 1 mg, PRN  dextrose, , Continuous PRN  hydrOXYzine HCl, 10 mg, TID PRN  labetalol, 10 mg, Q6H PRN  dicyclomine, 20 mg, 4x Daily PRN        Diagnostic Labs:  CBC:   Recent Labs     11/01/22  1126 11/02/22  0638 11/03/22  0629   WBC 10.8 11.1 15.0*   RBC 3.55* 3.09* 3.38*   HGB 9.9* 8.7* 9.3*   HCT 29.6* 26.0* 29.6*   MCV 83.4 84.1 87.6   RDW 13.6 13.9 13.7    244 267     BMP:   Recent Labs     11/01/22  2108 11/02/22  0638 11/03/22  0629    142 132*   K 3.5* 3.6* 4.6    109* 99 CO2 16* 18* 9*   BUN 16 17 22*   CREATININE 1.68* 1.90* 1.84*     BNP: No results for input(s): BNP in the last 72 hours. PT/INR: No results for input(s): PROTIME, INR in the last 72 hours. APTT: No results for input(s): APTT in the last 72 hours. CARDIAC ENZYMES: No results for input(s): CKMB, CKMBINDEX, TROPONINI in the last 72 hours. Invalid input(s): CKTOTAL;3  FASTING LIPID PANEL:No results found for: CHOL, HDL, TRIG  LIVER PROFILE:   Recent Labs     11/01/22  1126   AST 23   ALT 11   BILITOT 0.5   ALKPHOS 72      MICROBIOLOGY:   Lab Results   Component Value Date/Time    CULTURE NO SIGNIFICANT GROWTH 11/01/2022 04:23 PM       Imaging:    CT ABDOMEN PELVIS WO CONTRAST Additional Contrast? None    Result Date: 10/30/2022  Symmetric stranding of fat around the kidneys. Correlate for clinical signs of pyelonephritis or renal infarct. Trace pelvic free fluid. XR ABDOMEN (KUB) (SINGLE AP VIEW)    Result Date: 11/1/2022  No acute abdominal abnormality by radiograph. US GALLBLADDER RUQ    Result Date: 11/2/2022  Unremarkable right upper quadrant ultrasound. XR CHEST PORTABLE    Result Date: 10/30/2022  No evidence of acute pulmonary abnormality seen. No radiographic evidence of intra-free air is seen beneath the diaphragm. ASSESSMENT & PLAN     ASSESSMENT / PLAN:     Ketoacidosis due to diabetes ([address]  Plan:   Per patient, her insulin pump has suspended. Blood venous gases  pH 7.2  PCO2 21.8  PO2 83.2  HCO3 9.1  Beta hydroxybutyrate 7.22  POC glucose 291  Anion gap 24  On continuous 0.45% sodium chloride infusion  On insulin regular 100 units in 0.9% 100 mL. Hematemesis  Plan:   Hematemesis resolved, but patient still having 1-2 episode of vomiting, no blood in it  GI following, appreciate recommendations  GI performed EGD which showed  Reflux esophagitis and lower esophagus, esophageal nodule at GE junction, biopsy was done follow-up with biopsy.   Stomach showed small sliding hiatal hernia otherwise stomach was normal  Duodenum was also normal  Per GI, continue PPI twice daily   Repeat upper endoscopy in 4 to 6 weeks to check healing of esophageal nodule  Reglan injection 10 Mg every 6 hours  X-ray abdomen negative for perforation  Unremarkable right upper quadrant ultrasound  Promethazine as needed    Pyelonephritis  Plan:   WBC is 15.0   UA suggestive of UTI  Follow-up with urine culture-no significant growth  Abdominal CT done on last admission was suggestive of pyelonephritis or infarct. Diet: N.p.o. for now  DVT ppx : Heparin subcu 5000 units 3 times daily  GI ppx: Protonix 40 Mg IV twice daily    PT/OT/SW: No need for PT OT  Discharge Planning:  following will appreciate recommendations    Eric Wheatley M.D. Internal Medicine Resident PGY-1  [facility],  [facility]. 11:46 AM 11/3/2022     Please note that part of this chart was generated using voice recognition dictation software. Although every effort was made to ensure the accuracy of this automated transcription, some errors in transcription may have occurred.

## 2022-11-03 NOTE — PROGRESS NOTES
THE [facility] Gastroenterology Progress Note    Patient:   Suzette Stephens   :    1985   Facility:   [facility]   Date:    11/3/2022  Admission Dx:  Pyelonephritis [N12]  Acute pyelonephritis [N10]  Requesting physician: Mahendra Chua MD  Reason for consult:  Hematemesis   CC : \"Abdominal Pain\"      SUBJECTIVE     Patient examined at bedside. Still reports of abdominal pain with nausea and vomiting. No hematemesis. No bleeding per rectum, bloody bowel movements,diarrhea. HISTORY OF PRESENT ILLNESS   This is a 44 y.o. female who presents with abdominal pain associated with nausea and vomiting. Patient was apparently well, started having abdominal pain associated with nausea and vomiting on 10/30/2022. Patient came in with this complaint to the hospital ,CT abdomen showed pyelonephritis, patient was started on IV ceftriaxone. GI was consulted for hematemesis ,likely secondary to Delmis-Suarez tear  Recommended conservative treatment with Protonix 40 mg  twice daily for 8 weeks. Patient left AMA and came back with similar complaints on  . She reports increasing intensity of abdominal pain as  well as vomiting, with blood,multiple episodes that started as soon as she woke up. The pain is located diffusely along with B/l Flanks. The pain is described as aching and cramping, and is 6/10 in intensity. Symptoms have been gradually worsening since. Associated symptoms include chills. He mentions that she is not able to tolerate any oral or liquid diet since Friday when symptoms initially started. The patient denies any bloody stool, melena, bleeding per rectum. She denies any history of alcohol abuse, smoking history or any illicit drug use including Marijuana. Patient has past medical history of type 1 diabetes mellitus on insulin, hypertension, CKD , anemia of chronic disease.         OBJECTIVE:   PAST MEDICAL/SURGICAL HISTORY  Past Medical History:   Diagnosis Date Diabetes mellitus ([address]     type 1    Hypertension      Past Surgical History:   Procedure Laterality Date    TUBAL LIGATION  11/2018    UPPER GASTROINTESTINAL ENDOSCOPY N/A 11/2/2022    EGD BIOPSY performed by Brown Camp MD at [facility]       ALLERGIES:  Allergies   Allergen Reactions    Cinnamon Hives    Morphine Other (See Comments)     Hallucinations         HOME MEDICATIONS:  Prior to Admission medications    Medication Sig Start Date End Date Taking? Authorizing Provider   hydrOXYzine HCl (ATARAX) 25 MG tablet Take 25 mg by mouth 3 times daily as needed for Anxiety or Itching   Yes Historical Provider, MD   LOSARTAN POTASSIUM PO Take 50 mg by mouth daily    Historical Provider, MD   amLODIPine (NORVASC) 10 MG tablet Take 10 mg by mouth every evening  Patient not taking: Reported on 11/1/2022    Historical Provider, MD   gabapentin (NEURONTIN) 300 MG capsule Take 300 mg by mouth 3 times daily as needed.     Historical Provider, MD   ATORVASTATIN CALCIUM PO Take 10 mg by mouth every evening    Historical Provider, MD   cetirizine (ZYRTEC) 10 MG tablet Take 10 mg by mouth every evening    Historical Provider, MD   vitamin D (CHOLECALCIFEROL) 1000 UNIT TABS tablet Take 1,000 Units by mouth once a week Indications: on sundays  Patient not taking: Reported on 10/30/2022    Historical Provider, MD   Multiple Vitamins-Minerals (THERAPEUTIC MULTIVITAMIN-MINERALS) tablet Take 1 tablet by mouth daily  Patient not taking: Reported on 10/30/2022    Historical Provider, MD   ondansetron (ZOFRAN ODT) 4 MG disintegrating tablet Take 1 tablet by mouth every 8 hours as needed for Nausea or Vomiting  Patient not taking: No sig reported 1/26/17   Maddy Amador MD   AMITRIPTYLINE HCL PO Take 10 mg by mouth daily    Historical Provider, MD   Insulin Infusion Pump BERTA by Does not apply route    Historical Provider, MD   insulin aspart (NOVOLOG) 100 UNIT/ML injection vial Inject into the skin 3 times daily (before meals) Sliding scale    Historical Provider, MD       CURRENT MEDICATIONS:  Scheduled Meds:   sucralfate  1 g Oral 4 times per day    amLODIPine  10 mg Oral QPM    sodium chloride flush  5-40 mL IntraVENous 2 times per day    heparin (porcine)  5,000 Units SubCUTAneous 3 times per day    metoclopramide  10 mg IntraVENous Q6H    pantoprazole (PROTONIX) 40 mg injection  40 mg IntraVENous BID     Continuous Infusions:   dextrose 5 % and 0.45 % NaCl      insulin 0.1 Units/kg/hr (11/03/22 1152)    sodium chloride 250 mL/hr at 11/03/22 1146    sodium chloride      dextrose       PRN Meds:HYDROmorphone, dextrose bolus **OR** dextrose bolus, potassium chloride, magnesium sulfate, sodium phosphate IVPB **OR** sodium phosphate IVPB **OR** sodium phosphate IVPB, dextrose 5 % and 0.45 % NaCl, promethazine, clonazePAM, sodium chloride flush, sodium chloride, polyethylene glycol, glucose, dextrose bolus **OR** dextrose bolus, glucagon (rDNA), dextrose, hydrOXYzine HCl, labetalol, dicyclomine    SOCIAL HISTORY:     Tobacco:   reports that she has never smoked. She has never used smokeless tobacco.  Alcohol:   reports no history of alcohol use. Illicit drugs:  reports no history of drug use. FAMILY HISTORY:     History reviewed. No pertinent family history. Review Of Systems:  Constitutional: No fever, no chills, no lethargy, no weakness. HEENT:  No headache, otalgia, itchy eyes, nasal discharge or sore throat. Cardiac:  No chest pain, dyspnea, orthopnea or PND. Chest:   No cough, phlegm or wheezing. Abdomen:  No abdominal pain, nausea or vomiting. Neuro:  No focal weakness, abnormal movements or seizure like activity. Skin:   No rashes, no itching. :   No hematuria, no pyuria, no dysuria, no flank pain. Extremities:  No swelling or joint pains. ROS was otherwise negative except as mentioned in the [address]      VITALS:  BP (!) 140/79   Pulse 92   Temp 97.3 °F (36.3 °C) (Oral)   Resp 15   Ht 5' 1\" (1.549 m)   Wt 208 lb 5.4 oz (94.5 kg)   LMP  (LMP Unknown)   SpO2 97%   BMI 39.36 kg/m²   TEMPERATURE:  Current - Temp: 97.3 °F (36.3 °C); Max - Temp  Av °F (36.7 °C)  Min: 97 °F (36.1 °C)  Max: 98.5 °F (36.9 °C)    Physical Exam  Constitutional:       Appearance: Normal appearance. She is normal weight. Cardiovascular:      Rate and Rhythm: Normal rate and regular rhythm. Pulmonary:      Effort: Pulmonary effort is normal.      Breath sounds: Normal breath sounds. Abdominal:      General: Abdomen is flat. Tenderness: There is abdominal tenderness. There is no guarding or rebound. Neurological:      General: No focal deficit present. Mental Status: She is alert and oriented to person, place, and time. Mental status is at baseline.          LABS AND IMAGING:       CBC  Recent Labs     22  11222  0638 22  0629   WBC 10.8 11.1 15.0*   HGB 9.9* 8.7* 9.3*   HCT 29.6* 26.0* 29.6*   MCV 83.4 84.1 87.6   MCHC 33.4 33.5 31.4   RDW 13.6 13.9 13.7    244 267       Immature PLTs   No results found for: PLTFLUORE    ANEMIA STUDIES  Recent Labs     22   CGRISNOY14 425   FOLATE 15.9       BMP  Recent Labs     22  0638 22  0629    142 132*   K 3.5* 3.6* 4.6    109* 99   CO2 16* 18* 9*   BUN 16 17 22*   CREATININE 1.68* 1.90* 1.84*   GLUCOSE 128* 136* 381*   CALCIUM 8.4* 8.1* 7.9*       LFTS  Recent Labs     22  1126   ALKPHOS 72   ALT 11   AST 23   BILITOT 0.5   LABALBU 3.8       AMYLASE/LIPASE/AMMONIA  Recent Labs     22  1126   LIPASE 19       Acute Hepatitis Panel   No results found for: HEPBSAG, HEPCAB, HEPBIGM, HEPAIGM    HCV Genotype   No results found for: HEPATITISCGENOTYPE    HCV Quantitative   No results found for: HCVQNT    LIVER WORK UP:    AFP  No results found for: AFP    Alpha 1 antitrypsin   No results found for: A1A    Anti - Liver/Kidney Ab  No results found for: LIVER-KIDNEYMICROSOMALAB    FRANCISCO  No results found for: FRANCISCO    AMA  No results found for: MITOAB    ASMA  No results found for: SMOOTHMUSCAB    Ceruloplasmin  No results found for: CERULOPLSM    Celiac panel  No results found for: TISSTRNTIIGG, TTGIGA, IGA    IgG  No results found for: IGG    IgM  No results found for: IGM    GGT   No results found for: LABGGT    PT/INR  No results for input(s): PROTIME, INR in the last 72 hours. Cancer Markers:  CEA:  No results for input(s): CEA in the last 72 hours. Ca 125:  No results for input(s):  in the last 72 hours. Ca 19-9:   No results for input(s):  in the last 72 hours. AFP: No results for input(s): AFP in the last 72 hours. Lactic acid:  Recent Labs     11/01/22  1126 11/01/22 2108   LACTACIDWB 2.8* 1.6         Radiology Review:    CT ABDOMEN PELVIS WO CONTRAST Additional Contrast? None    Result Date: 10/30/2022  Symmetric stranding of fat around the kidneys. Correlate for clinical signs of pyelonephritis or renal infarct. Trace pelvic free fluid. XR ABDOMEN (KUB) (SINGLE AP VIEW)    Result Date: 11/1/2022  No acute abdominal abnormality by radiograph. US GALLBLADDER RUQ    Result Date: 11/2/2022  Unremarkable right upper quadrant ultrasound. XR CHEST PORTABLE    Result Date: 10/30/2022  No evidence of acute pulmonary abnormality seen. No radiographic evidence of intra-free air is seen beneath the diaphragm. Principal Problem:    Hematemesis  Active Problems:    Pyelonephritis    Ketoacidosis due to diabetes [facility])  Resolved Problems:    * No resolved hospital problems. *       GI Assessment&Plan:      -Patient still continues to have nausea, vomitings but no concern for hematemesis. -EGD showed grade B reflux esophagitis in the lower esophagus.  -We recommend PPI twice daily. -12 mm nodule noted at the GE junction. F/u with Biopsy Results. -Patient will need a repeat upper endoscopy in 4 to 6 weeks to check the nodule. Recommend the patient follow-up with outpatient GI to schedule repeat endoscopy. -Rest of the management as per primary. -GI will sign off. This plan was formulated in collaboration with Dr. Mario Pal.  Thank you for allowing us to participate in the care of your patient. Electronically signed by: Heather Gunderson MD on 11/3/2022 at 12:05 PM       Please note that this note was generated using a voice recognition dictation software. Although every effort was made to ensure the accuracy of this automated transcription, some errors in transcription may have occurred.

## 2022-11-03 NOTE — PLAN OF CARE
Problem: Discharge Planning  Goal: Discharge to home or other facility with appropriate resources  Outcome: Progressing  Flowsheets (Taken 11/2/2022 2000)  Discharge to home or other facility with appropriate resources: Identify barriers to discharge with patient and caregiver     Problem: Pain  Goal: Verbalizes/displays adequate comfort level or baseline comfort level  Outcome: Progressing     Problem: Safety - Adult  Goal: Free from fall injury  Outcome: Progressing     Problem: Chronic Conditions and Co-morbidities  Goal: Patient's chronic conditions and co-morbidity symptoms are monitored and maintained or improved  Outcome: Progressing  Flowsheets (Taken 11/2/2022 2000)  Care Plan - Patient's Chronic Conditions and Co-Morbidity Symptoms are Monitored and Maintained or Improved: Monitor and assess patient's chronic conditions and comorbid symptoms for stability, deterioration, or improvement

## 2022-11-03 NOTE — PROGRESS NOTES
[facility] Diabetes Education Nurse      Reason for Educator consult --- Evaluation and Assessment of use of insulin pump (continuous subcutaneous insulin infusion) from home and plan for continuation of patient self-management of insulin pump as inpatient.      1:00pm Writer reviewed chart - noted in care Everywhere patient has endocrinology care at [address] [place] [address] - with contact number [phone]. Called to office and staff confirmed she is current patient, requested last notes and insulin pump plan to be faxed to [phone] -     1 :30 pm call back from [phone], MSN, RN - RN Care Coordinator - [address] [place] [address] Physicians, Endocrinology - Jen [phone] [facility], [address] [facility]    Pump settings from Sept 2, 2022 were shared ( see media section)       Plan off pump insulin is sq :  MDI backup is Lantus 19 units QD  and Humlalog  per pump ICR and sensitivity. ( Mealtime and correction bolus)     Per Perfect serve to Dr Devin Ortiz above information. Clarified also that insulin pump is now off, had been on prior during this admission, 11/1/22 - 11/3/22 ( early am) and wants to resume home insulin pump once DKA clears. Met with patient at bedside  She stated She has been on Tandem pump for last 6 months, with dexcom. Pump has control IQ function for auto basal rate adjustment on sensor glucose. She stated has alarms overnight  senor glucose up and had occlusion alarms and then pump stopped. She did not have any extra pump supplies  ( sets and cartridges) at bedside and then evolved into DKA  Patient does have knowledge to restart pump and start new infusion site and pair with CGM. She also related to writer she has brittle diabetes and has DKA also few months ago. Once pt DKA clears, she can put her pump back on with  - with a new site placed - she needs to have her SO bring in supplies - Novolog vial, infusion set/ cartridges as well as extra dexcom CGM.   Patient

## 2022-11-03 NOTE — PROGRESS NOTES
Physical Therapy        Physical Therapy Cancel Note      DATE: 11/3/2022    NAME: Lila Del Rio  MRN: [id number]   : 1985      Patient not seen this date for Physical Therapy due to:    Patient independent with functional mobility. Will defer PT evaluation at this time. Please reorder PT if future needs arise. Discussed with pt who verbalized agreement and denies concerns returning home.       Electronically signed by Dharmesh Lane PT on 11/3/2022 at 10:38 AM

## 2022-11-03 NOTE — PROGRESS NOTES
Occupational [address]  Occupational Therapy Not Seen Note    DATE: 11/3/2022    NAME: Karen Figueroa  MRN: [id number]   : 1985      Patient not seen this date for Occupational Therapy due to:    Patient independent with ADLs and functional tasks with no acute OT needs. Will defer OT evaluation at this time. Please reorder OT if future needs arise.        Electronically signed by Madison West OT on 11/3/2022 at 10:38 AM

## 2022-11-03 NOTE — PROGRESS NOTES
Occupational [address]  Occupational Therapy Not Seen Note    DATE: 11/3/2022    NAME: Georgina Robbins  MRN: [id number]   : 1985      Patient not seen this date for Occupational Therapy due to: Other: Pt declined to complete mobility to chair to eat breakfast. Pt eating breakfast upon arrival and RN passing medications. Will check back as time allows or 2022.      Electronically signed by Hung Mott OT on 11/3/2022 at 10:37 AM

## 2022-11-04 ENCOUNTER — TELEPHONE (OUTPATIENT)
Dept: GASTROENTEROLOGY | Age: 37
End: 2022-11-04

## 2022-11-04 ENCOUNTER — APPOINTMENT (OUTPATIENT)
Dept: ULTRASOUND IMAGING | Age: 37
DRG: 368 | End: 2022-11-04
Payer: COMMERCIAL

## 2022-11-04 LAB
ABSOLUTE EOS #: 0.18 K/UL (ref 0–0.44)
ABSOLUTE IMMATURE GRANULOCYTE: 0.05 K/UL (ref 0–0.3)
ABSOLUTE LYMPH #: 3.64 K/UL (ref 1.1–3.7)
ABSOLUTE MONO #: 0.63 K/UL (ref 0.1–1.2)
ANION GAP SERPL CALCULATED.3IONS-SCNC: 11 MMOL/L (ref 9–17)
ANION GAP SERPL CALCULATED.3IONS-SCNC: 13 MMOL/L (ref 9–17)
ANION GAP SERPL CALCULATED.3IONS-SCNC: 14 MMOL/L (ref 9–17)
ANION GAP SERPL CALCULATED.3IONS-SCNC: 14 MMOL/L (ref 9–17)
ANION GAP SERPL CALCULATED.3IONS-SCNC: 15 MMOL/L (ref 9–17)
ANION GAP SERPL CALCULATED.3IONS-SCNC: 15 MMOL/L (ref 9–17)
ANION GAP SERPL CALCULATED.3IONS-SCNC: 9 MMOL/L (ref 9–17)
BASOPHILS # BLD: 1 % (ref 0–2)
BASOPHILS ABSOLUTE: 0.07 K/UL (ref 0–0.2)
BILIRUBIN URINE: NEGATIVE
BUN BLDV-MCNC: 22 MG/DL (ref 6–20)
BUN BLDV-MCNC: 23 MG/DL (ref 6–20)
BUN BLDV-MCNC: 24 MG/DL (ref 6–20)
BUN BLDV-MCNC: 24 MG/DL (ref 6–20)
BUN BLDV-MCNC: 25 MG/DL (ref 6–20)
BUN BLDV-MCNC: 26 MG/DL (ref 6–20)
BUN BLDV-MCNC: 26 MG/DL (ref 6–20)
CALCIUM SERPL-MCNC: 7.3 MG/DL (ref 8.6–10.4)
CALCIUM SERPL-MCNC: 7.3 MG/DL (ref 8.6–10.4)
CALCIUM SERPL-MCNC: 7.5 MG/DL (ref 8.6–10.4)
CALCIUM SERPL-MCNC: 7.6 MG/DL (ref 8.6–10.4)
CALCIUM SERPL-MCNC: 7.7 MG/DL (ref 8.6–10.4)
CALCIUM SERPL-MCNC: 7.7 MG/DL (ref 8.6–10.4)
CALCIUM SERPL-MCNC: 7.8 MG/DL (ref 8.6–10.4)
CARBOXYHEMOGLOBIN: 1 % (ref 0–5)
CASTS UA: ABNORMAL /LPF (ref 0–2)
CASTS UA: ABNORMAL /LPF (ref 0–2)
CHLORIDE BLD-SCNC: 102 MMOL/L (ref 98–107)
CHLORIDE BLD-SCNC: 103 MMOL/L (ref 98–107)
CHLORIDE BLD-SCNC: 105 MMOL/L (ref 98–107)
CHLORIDE BLD-SCNC: 105 MMOL/L (ref 98–107)
CHLORIDE BLD-SCNC: 106 MMOL/L (ref 98–107)
CHLORIDE BLD-SCNC: 111 MMOL/L (ref 98–107)
CHLORIDE BLD-SCNC: 112 MMOL/L (ref 98–107)
CO2: 13 MMOL/L (ref 20–31)
CO2: 13 MMOL/L (ref 20–31)
CO2: 14 MMOL/L (ref 20–31)
CO2: 14 MMOL/L (ref 20–31)
CO2: 16 MMOL/L (ref 20–31)
CO2: 17 MMOL/L (ref 20–31)
CO2: 17 MMOL/L (ref 20–31)
COLOR: YELLOW
CREAT SERPL-MCNC: 2.37 MG/DL (ref 0.5–0.9)
CREAT SERPL-MCNC: 2.42 MG/DL (ref 0.5–0.9)
CREAT SERPL-MCNC: 2.57 MG/DL (ref 0.5–0.9)
CREAT SERPL-MCNC: 2.58 MG/DL (ref 0.5–0.9)
CREAT SERPL-MCNC: 2.59 MG/DL (ref 0.5–0.9)
CREAT SERPL-MCNC: 2.59 MG/DL (ref 0.5–0.9)
CREAT SERPL-MCNC: 2.62 MG/DL (ref 0.5–0.9)
CREATININE URINE: 111.1 MG/DL (ref 28–217)
CREATININE URINE: 94 MG/DL (ref 28–217)
CULTURE: NORMAL
EOSINOPHILS RELATIVE PERCENT: 2 % (ref 1–4)
EPITHELIAL CELLS UA: ABNORMAL /HPF (ref 0–5)
FIO2: ABNORMAL
GFR SERPL CREATININE-BSD FRML MDRD: 23 ML/MIN/1.73M2
GFR SERPL CREATININE-BSD FRML MDRD: 24 ML/MIN/1.73M2
GFR SERPL CREATININE-BSD FRML MDRD: 26 ML/MIN/1.73M2
GFR SERPL CREATININE-BSD FRML MDRD: 26 ML/MIN/1.73M2
GLUCOSE BLD-MCNC: 104 MG/DL (ref 65–105)
GLUCOSE BLD-MCNC: 106 MG/DL (ref 65–105)
GLUCOSE BLD-MCNC: 109 MG/DL (ref 70–99)
GLUCOSE BLD-MCNC: 115 MG/DL (ref 65–105)
GLUCOSE BLD-MCNC: 116 MG/DL (ref 65–105)
GLUCOSE BLD-MCNC: 118 MG/DL (ref 65–105)
GLUCOSE BLD-MCNC: 144 MG/DL (ref 70–99)
GLUCOSE BLD-MCNC: 145 MG/DL (ref 70–99)
GLUCOSE BLD-MCNC: 157 MG/DL (ref 65–105)
GLUCOSE BLD-MCNC: 171 MG/DL (ref 65–105)
GLUCOSE BLD-MCNC: 176 MG/DL (ref 65–105)
GLUCOSE BLD-MCNC: 177 MG/DL (ref 65–105)
GLUCOSE BLD-MCNC: 209 MG/DL (ref 65–105)
GLUCOSE BLD-MCNC: 225 MG/DL (ref 70–99)
GLUCOSE BLD-MCNC: 71 MG/DL (ref 70–99)
GLUCOSE BLD-MCNC: 72 MG/DL (ref 65–105)
GLUCOSE BLD-MCNC: 82 MG/DL (ref 65–105)
GLUCOSE BLD-MCNC: 83 MG/DL (ref 65–105)
GLUCOSE BLD-MCNC: 85 MG/DL (ref 65–105)
GLUCOSE BLD-MCNC: 87 MG/DL (ref 65–105)
GLUCOSE BLD-MCNC: 88 MG/DL (ref 65–105)
GLUCOSE BLD-MCNC: 88 MG/DL (ref 70–99)
GLUCOSE BLD-MCNC: 89 MG/DL (ref 65–105)
GLUCOSE BLD-MCNC: 92 MG/DL (ref 65–105)
GLUCOSE BLD-MCNC: 93 MG/DL (ref 70–99)
GLUCOSE BLD-MCNC: 97 MG/DL (ref 65–105)
GLUCOSE BLD-MCNC: 99 MG/DL (ref 65–105)
GLUCOSE URINE: NEGATIVE
HCO3 VENOUS: 14.7 MMOL/L (ref 24–30)
HCT VFR BLD CALC: 24 % (ref 36.3–47.1)
HEMOGLOBIN: 7.9 G/DL (ref 11.9–15.1)
IMMATURE GRANULOCYTES: 1 %
KETONES, URINE: ABNORMAL
LACTIC ACID, WHOLE BLOOD: 0.6 MMOL/L (ref 0.7–2.1)
LEUKOCYTE ESTERASE, URINE: NEGATIVE
LYMPHOCYTES # BLD: 39 % (ref 24–43)
MAGNESIUM: 1.7 MG/DL (ref 1.6–2.6)
MAGNESIUM: 1.8 MG/DL (ref 1.6–2.6)
MAGNESIUM: 2 MG/DL (ref 1.6–2.6)
MAGNESIUM: 2.3 MG/DL (ref 1.6–2.6)
MAGNESIUM: 2.4 MG/DL (ref 1.6–2.6)
MAGNESIUM: 2.5 MG/DL (ref 1.6–2.6)
MCH RBC QN AUTO: 28.1 PG (ref 25.2–33.5)
MCHC RBC AUTO-ENTMCNC: 32.9 G/DL (ref 28.4–34.8)
MCV RBC AUTO: 85.4 FL (ref 82.6–102.9)
MONOCYTES # BLD: 7 % (ref 3–12)
MUCUS: ABNORMAL
NEGATIVE BASE EXCESS, VEN: 11.4 MMOL/L (ref 0–2)
NITRITE, URINE: NEGATIVE
NRBC AUTOMATED: 0 PER 100 WBC
O2 SAT, VEN: 83.3 % (ref 60–85)
PATIENT TEMP: 37
PCO2, VEN: 35.9 MM HG (ref 39–55)
PDW BLD-RTO: 13.9 % (ref 11.8–14.4)
PH UA: 5.5 (ref 5–8)
PH VENOUS: 7.24 (ref 7.32–7.42)
PHOSPHORUS: 2 MG/DL (ref 2.6–4.5)
PHOSPHORUS: 3.2 MG/DL (ref 2.6–4.5)
PHOSPHORUS: 3.4 MG/DL (ref 2.6–4.5)
PHOSPHORUS: 3.4 MG/DL (ref 2.6–4.5)
PHOSPHORUS: 3.5 MG/DL (ref 2.6–4.5)
PHOSPHORUS: 3.6 MG/DL (ref 2.6–4.5)
PLATELET # BLD: ABNORMAL K/UL (ref 138–453)
PLATELET, FLUORESCENCE: 232 K/UL (ref 138–453)
PLATELET, IMMATURE FRACTION: 1.4 % (ref 1.1–10.3)
PO2, VEN: 51.9 MM HG (ref 30–50)
POTASSIUM SERPL-SCNC: 3.2 MMOL/L (ref 3.7–5.3)
POTASSIUM SERPL-SCNC: 3.4 MMOL/L (ref 3.7–5.3)
POTASSIUM SERPL-SCNC: 3.5 MMOL/L (ref 3.7–5.3)
POTASSIUM SERPL-SCNC: 3.6 MMOL/L (ref 3.7–5.3)
POTASSIUM SERPL-SCNC: 4 MMOL/L (ref 3.7–5.3)
POTASSIUM SERPL-SCNC: 4.1 MMOL/L (ref 3.7–5.3)
POTASSIUM SERPL-SCNC: 4.2 MMOL/L (ref 3.7–5.3)
PROTEIN UA: ABNORMAL
RBC # BLD: 2.81 M/UL (ref 3.95–5.11)
RBC UA: ABNORMAL /HPF (ref 0–2)
SEG NEUTROPHILS: 52 % (ref 36–65)
SEGMENTED NEUTROPHILS ABSOLUTE COUNT: 4.85 K/UL (ref 1.5–8.1)
SODIUM BLD-SCNC: 131 MMOL/L (ref 135–144)
SODIUM BLD-SCNC: 131 MMOL/L (ref 135–144)
SODIUM BLD-SCNC: 133 MMOL/L (ref 135–144)
SODIUM BLD-SCNC: 134 MMOL/L (ref 135–144)
SODIUM BLD-SCNC: 134 MMOL/L (ref 135–144)
SODIUM BLD-SCNC: 138 MMOL/L (ref 135–144)
SODIUM BLD-SCNC: 138 MMOL/L (ref 135–144)
SODIUM,UR: <20 MMOL/L
SPECIFIC GRAVITY UA: 1.01 (ref 1–1.03)
SPECIMEN DESCRIPTION: NORMAL
TOTAL PROTEIN, URINE: 123 MG/DL
TURBIDITY: CLEAR
URINE HGB: ABNORMAL
URINE TOTAL PROTEIN CREATININE RATIO: 1.31 (ref 0–0.2)
UROBILINOGEN, URINE: NORMAL
WBC # BLD: 9.4 K/UL (ref 3.5–11.3)
WBC UA: ABNORMAL /HPF (ref 0–5)

## 2022-11-04 PROCEDURE — 81001 URINALYSIS AUTO W/SCOPE: CPT

## 2022-11-04 PROCEDURE — C9113 INJ PANTOPRAZOLE SODIUM, VIA: HCPCS | Performed by: INTERNAL MEDICINE

## 2022-11-04 PROCEDURE — 85025 COMPLETE CBC W/AUTO DIFF WBC: CPT

## 2022-11-04 PROCEDURE — 6360000002 HC RX W HCPCS: Performed by: INTERNAL MEDICINE

## 2022-11-04 PROCEDURE — 83605 ASSAY OF LACTIC ACID: CPT

## 2022-11-04 PROCEDURE — 80048 BASIC METABOLIC PNL TOTAL CA: CPT

## 2022-11-04 PROCEDURE — 82805 BLOOD GASES W/O2 SATURATION: CPT

## 2022-11-04 PROCEDURE — 82947 ASSAY GLUCOSE BLOOD QUANT: CPT

## 2022-11-04 PROCEDURE — 83735 ASSAY OF MAGNESIUM: CPT

## 2022-11-04 PROCEDURE — 6370000000 HC RX 637 (ALT 250 FOR IP)

## 2022-11-04 PROCEDURE — 2580000003 HC RX 258

## 2022-11-04 PROCEDURE — 36415 COLL VENOUS BLD VENIPUNCTURE: CPT

## 2022-11-04 PROCEDURE — 84100 ASSAY OF PHOSPHORUS: CPT

## 2022-11-04 PROCEDURE — 6360000002 HC RX W HCPCS

## 2022-11-04 PROCEDURE — 82010 KETONE BODYS QUAN: CPT

## 2022-11-04 PROCEDURE — 2500000003 HC RX 250 WO HCPCS

## 2022-11-04 PROCEDURE — 6370000000 HC RX 637 (ALT 250 FOR IP): Performed by: INTERNAL MEDICINE

## 2022-11-04 PROCEDURE — 76770 US EXAM ABDO BACK WALL COMP: CPT

## 2022-11-04 PROCEDURE — 84156 ASSAY OF PROTEIN URINE: CPT

## 2022-11-04 PROCEDURE — 84300 ASSAY OF URINE SODIUM: CPT

## 2022-11-04 PROCEDURE — 82570 ASSAY OF URINE CREATININE: CPT

## 2022-11-04 PROCEDURE — 99222 1ST HOSP IP/OBS MODERATE 55: CPT | Performed by: INTERNAL MEDICINE

## 2022-11-04 PROCEDURE — 2580000003 HC RX 258: Performed by: INTERNAL MEDICINE

## 2022-11-04 PROCEDURE — 2060000000 HC ICU INTERMEDIATE R&B

## 2022-11-04 PROCEDURE — G0108 DIAB MANAGE TRN  PER INDIV: HCPCS

## 2022-11-04 PROCEDURE — 85055 RETICULATED PLATELET ASSAY: CPT

## 2022-11-04 RX ORDER — MAGNESIUM SULFATE IN WATER 40 MG/ML
2000 INJECTION, SOLUTION INTRAVENOUS ONCE
Status: COMPLETED | OUTPATIENT
Start: 2022-11-04 | End: 2022-11-04

## 2022-11-04 RX ORDER — POTASSIUM CHLORIDE 20 MEQ/1
20 TABLET, EXTENDED RELEASE ORAL 2 TIMES DAILY WITH MEALS
Status: DISCONTINUED | OUTPATIENT
Start: 2022-11-04 | End: 2022-11-04

## 2022-11-04 RX ORDER — POTASSIUM CHLORIDE 20 MEQ/1
40 TABLET, EXTENDED RELEASE ORAL ONCE
Status: COMPLETED | OUTPATIENT
Start: 2022-11-04 | End: 2022-11-04

## 2022-11-04 RX ORDER — POTASSIUM CHLORIDE 7.45 MG/ML
10 INJECTION INTRAVENOUS
Status: ACTIVE | OUTPATIENT
Start: 2022-11-04 | End: 2022-11-04

## 2022-11-04 RX ORDER — DEXTROSE AND SODIUM CHLORIDE 5; .9 G/100ML; G/100ML
INJECTION, SOLUTION INTRAVENOUS CONTINUOUS
Status: DISCONTINUED | OUTPATIENT
Start: 2022-11-04 | End: 2022-11-05

## 2022-11-04 RX ADMIN — HYDROMORPHONE HYDROCHLORIDE 0.5 MG: 1 INJECTION, SOLUTION INTRAMUSCULAR; INTRAVENOUS; SUBCUTANEOUS at 15:42

## 2022-11-04 RX ADMIN — DEXTROSE AND SODIUM CHLORIDE: 5; 450 INJECTION, SOLUTION INTRAVENOUS at 06:23

## 2022-11-04 RX ADMIN — DEXTROSE AND SODIUM CHLORIDE: 5; 900 INJECTION, SOLUTION INTRAVENOUS at 21:11

## 2022-11-04 RX ADMIN — METOCLOPRAMIDE 10 MG: 5 INJECTION, SOLUTION INTRAMUSCULAR; INTRAVENOUS at 08:03

## 2022-11-04 RX ADMIN — HYDROMORPHONE HYDROCHLORIDE 0.5 MG: 1 INJECTION, SOLUTION INTRAMUSCULAR; INTRAVENOUS; SUBCUTANEOUS at 19:33

## 2022-11-04 RX ADMIN — PROMETHAZINE HYDROCHLORIDE 6.25 MG: 25 INJECTION INTRAMUSCULAR; INTRAVENOUS at 21:36

## 2022-11-04 RX ADMIN — HYDROMORPHONE HYDROCHLORIDE 0.5 MG: 1 INJECTION, SOLUTION INTRAMUSCULAR; INTRAVENOUS; SUBCUTANEOUS at 07:28

## 2022-11-04 RX ADMIN — PROMETHAZINE HYDROCHLORIDE 6.25 MG: 25 INJECTION INTRAMUSCULAR; INTRAVENOUS at 05:04

## 2022-11-04 RX ADMIN — PROMETHAZINE HYDROCHLORIDE 6.25 MG: 25 INJECTION INTRAMUSCULAR; INTRAVENOUS at 16:26

## 2022-11-04 RX ADMIN — SODIUM CHLORIDE 0.01 UNITS/KG/HR: 9 INJECTION, SOLUTION INTRAVENOUS at 18:27

## 2022-11-04 RX ADMIN — SODIUM CHLORIDE, PRESERVATIVE FREE 10 ML: 5 INJECTION INTRAVENOUS at 08:04

## 2022-11-04 RX ADMIN — POTASSIUM CHLORIDE 10 MEQ: 7.46 INJECTION, SOLUTION INTRAVENOUS at 05:19

## 2022-11-04 RX ADMIN — SODIUM PHOSPHATE, MONOBASIC, MONOHYDRATE AND SODIUM PHOSPHATE, DIBASIC, ANHYDROUS 10 MMOL: 142; 276 INJECTION, SOLUTION INTRAVENOUS at 00:21

## 2022-11-04 RX ADMIN — POTASSIUM CHLORIDE 10 MEQ: 7.46 INJECTION, SOLUTION INTRAVENOUS at 12:04

## 2022-11-04 RX ADMIN — METOCLOPRAMIDE 10 MG: 5 INJECTION, SOLUTION INTRAMUSCULAR; INTRAVENOUS at 16:50

## 2022-11-04 RX ADMIN — METOCLOPRAMIDE 10 MG: 5 INJECTION, SOLUTION INTRAMUSCULAR; INTRAVENOUS at 20:53

## 2022-11-04 RX ADMIN — DEXTROSE AND SODIUM CHLORIDE: 5; 900 INJECTION, SOLUTION INTRAVENOUS at 11:25

## 2022-11-04 RX ADMIN — MAGNESIUM SULFATE HEPTAHYDRATE 2000 MG: 40 INJECTION, SOLUTION INTRAVENOUS at 08:08

## 2022-11-04 RX ADMIN — METOCLOPRAMIDE 10 MG: 5 INJECTION, SOLUTION INTRAMUSCULAR; INTRAVENOUS at 03:03

## 2022-11-04 RX ADMIN — PROMETHAZINE HYDROCHLORIDE 6.25 MG: 25 INJECTION INTRAMUSCULAR; INTRAVENOUS at 12:05

## 2022-11-04 RX ADMIN — SODIUM CHLORIDE, PRESERVATIVE FREE 40 MG: 5 INJECTION INTRAVENOUS at 20:52

## 2022-11-04 RX ADMIN — SODIUM CHLORIDE, PRESERVATIVE FREE 40 MG: 5 INJECTION INTRAVENOUS at 08:03

## 2022-11-04 RX ADMIN — HYDROMORPHONE HYDROCHLORIDE 0.5 MG: 1 INJECTION, SOLUTION INTRAMUSCULAR; INTRAVENOUS; SUBCUTANEOUS at 11:25

## 2022-11-04 RX ADMIN — POTASSIUM CHLORIDE 10 MEQ: 7.46 INJECTION, SOLUTION INTRAVENOUS at 09:29

## 2022-11-04 RX ADMIN — HYDROMORPHONE HYDROCHLORIDE 0.5 MG: 1 INJECTION, SOLUTION INTRAMUSCULAR; INTRAVENOUS; SUBCUTANEOUS at 23:33

## 2022-11-04 RX ADMIN — HYDROMORPHONE HYDROCHLORIDE 0.5 MG: 1 INJECTION, SOLUTION INTRAMUSCULAR; INTRAVENOUS; SUBCUTANEOUS at 03:08

## 2022-11-04 RX ADMIN — SODIUM CHLORIDE 0.05 UNITS/KG/HR: 9 INJECTION, SOLUTION INTRAVENOUS at 15:16

## 2022-11-04 RX ADMIN — PROMETHAZINE HYDROCHLORIDE 6.25 MG: 25 INJECTION INTRAMUSCULAR; INTRAVENOUS at 00:04

## 2022-11-04 RX ADMIN — SODIUM CHLORIDE 0.03 UNITS/KG/HR: 9 INJECTION, SOLUTION INTRAVENOUS at 17:25

## 2022-11-04 RX ADMIN — POTASSIUM CHLORIDE 40 MEQ: 20 TABLET, EXTENDED RELEASE ORAL at 16:50

## 2022-11-04 RX ADMIN — AMLODIPINE BESYLATE 10 MG: 10 TABLET ORAL at 19:08

## 2022-11-04 ASSESSMENT — PAIN DESCRIPTION - LOCATION
LOCATION: ABDOMEN
LOCATION: FLANK
LOCATION: ABDOMEN;BACK
LOCATION: BACK

## 2022-11-04 ASSESSMENT — PAIN DESCRIPTION - DESCRIPTORS: DESCRIPTORS: ACHING

## 2022-11-04 ASSESSMENT — ENCOUNTER SYMPTOMS
NAUSEA: 0
ABDOMINAL PAIN: 0
COUGH: 0
DIARRHEA: 0
CONSTIPATION: 0
ALLERGIC/IMMUNOLOGIC NEGATIVE: 1
SHORTNESS OF BREATH: 0
EYES NEGATIVE: 1
VOMITING: 0
APNEA: 0

## 2022-11-04 ASSESSMENT — PAIN SCALES - GENERAL
PAINLEVEL_OUTOF10: 7
PAINLEVEL_OUTOF10: 6
PAINLEVEL_OUTOF10: 7

## 2022-11-04 ASSESSMENT — PAIN DESCRIPTION - FREQUENCY: FREQUENCY: CONTINUOUS

## 2022-11-04 ASSESSMENT — PAIN DESCRIPTION - ONSET: ONSET: ON-GOING

## 2022-11-04 ASSESSMENT — PAIN DESCRIPTION - ORIENTATION: ORIENTATION: RIGHT;LEFT;LOWER

## 2022-11-04 NOTE — CONSULTS
Renal Consult Note    Patient :  Keven Jimenez; 44 y.o. MRN# [id number]  Location:  2017/2017-01  Attending:  Rafaela Forrester MD  Admit Date:  11/1/2022   Hospital Day: 3    Reason for Consult:     Asked by Dr Rafaela Forrester MD to see for JORGE/Elevated Creatinine. History Obtained From:     patient, electronic medical record    History of Present Illness:     Keven Jimenez; 44 y.o. female with past medical history significant of  -Type 1 diabetes mellitus (on insulin pump)  -Hypertension  -CKD stage 3   -GERD    She was recently discharged from the hospital after being admitted for nausea, vomiting, hematemesis due to Delmis-Suarez tear or gastritis. CT abdomen/pelvis showed pyelonephritis vs renal infarct because of fat stranding of bilateral kidneys. GI was consulted, she was given IV Protonix, GI was consulted, who recommended conservative treatment with for 8 weeks. Being treated with Rocephin for pyelonephritis. Subsequently patient left AMA. And came back a day later with worsening bilateral abdominal pain, flank pain, nausea and vomiting. She did report intermittent episodes of hematemesis with some coffee-ground component/dark digested blood component. She also had diarrhea. She has poor appetite, was tolerating liquid diet for a while but then started throwing up again. She also reported subjective fever and chills. She was restarted on Rocephin to cover for UTI. She also had difficulty urinating, therefore Elam's catheter was placed. She underwent EGD, found to have mild esophagitis, hiatal hernia, esophageal nodule. Recommended to get repeat EGD in 4 to 6 weeks to confirm healing of esophageal nodule. She was also in DKA, was placed on insulin drip with the protocol, was eventually bridged to her home insulin pump. Nephrology is consulted for JORGE. As per the patient, she has CKD and follows with nephrologist in [place].  She recently moved to the area and did not get established yet.  Her baseline, she thinks is around 2. She had never had kidney biopsy done. She has diabetic retinopathy and also her blood sugar has not been controlled well. No history of recent contrast exposure, No h/o prolonged NSAIDs use in the past, No h/o nephrolithiasis, No recent skin rashes or arthralgias, No hematuria or pyuria noticed in the recent past. Doesn't report any reduction in the urine output recently. Non report of any obstructive urinary symptoms (urgency, frequency, weak stream, straining while urination). No h/o recurrent UTIs in the past.    Past History/Allergies? Social History:     Past Medical History:   Diagnosis Date    Diabetes mellitus ([facility] [address]     type 1    Hypertension        Past Surgical History:   Procedure Laterality Date    TUBAL LIGATION  11/2018    UPPER GASTROINTESTINAL ENDOSCOPY N/A 11/2/2022    EGD BIOPSY performed by Bell Louis MD at [facility]        Allergies   Allergen Reactions    Cinnamon Hives    Morphine Other (See Comments)     Hallucinations         Social History     Socioeconomic History    Marital status: Single     Spouse name: Not on file    Number of children: Not on file    Years of education: Not on file    Highest education level: Not on file   Occupational History    Not on file   Tobacco Use    Smoking status: Never    Smokeless tobacco: Never   Substance and Sexual Activity    Alcohol use: No    Drug use: Never    Sexual activity: Not on file   Other Topics Concern    Not on file   Social History Narrative    Not on file     Social Determinants of Health     Financial Resource Strain: Not on file   Food Insecurity: Not on file   Transportation Needs: Not on file   Physical Activity: Not on file   Stress: Not on file   Social Connections: Not on file   Intimate Partner Violence: Not on file   Housing Stability: Not on file       Family History:      History reviewed. No pertinent family history.     Outpatient Medications:     Medications Prior to Admission: hydrOXYzine HCl (ATARAX) 25 MG tablet, Take 25 mg by mouth 3 times daily as needed for Anxiety or Itching  LOSARTAN POTASSIUM PO, Take 50 mg by mouth daily  amLODIPine (NORVASC) 10 MG tablet, Take 10 mg by mouth every evening (Patient not taking: Reported on 11/1/2022)  gabapentin (NEURONTIN) 300 MG capsule, Take 300 mg by mouth 3 times daily as needed.   ATORVASTATIN CALCIUM PO, Take 10 mg by mouth every evening  cetirizine (ZYRTEC) 10 MG tablet, Take 10 mg by mouth every evening  vitamin D (CHOLECALCIFEROL) 1000 UNIT TABS tablet, Take 1,000 Units by mouth once a week Indications: on sundays (Patient not taking: Reported on 10/30/2022)  Multiple Vitamins-Minerals (THERAPEUTIC MULTIVITAMIN-MINERALS) tablet, Take 1 tablet by mouth daily (Patient not taking: Reported on 10/30/2022)  ondansetron (ZOFRAN ODT) 4 MG disintegrating tablet, Take 1 tablet by mouth every 8 hours as needed for Nausea or Vomiting (Patient not taking: No sig reported)  AMITRIPTYLINE HCL PO, Take 10 mg by mouth daily  Insulin Infusion Pump BERTA, by Does not apply route  insulin aspart (NOVOLOG) 100 UNIT/ML injection vial, Inject into the skin 3 times daily (before meals) Sliding scale    Current Medications:     Scheduled Meds:    potassium chloride  10 mEq IntraVENous Q1H    [Held by provider] Insulin Pump - Bolus Dose   SubCUTAneous 4x Daily AC & HS    [Held by provider] Insulin Pump - Basal Dose   SubCUTAneous Daily    sucralfate  1 g Oral 4 times per day    amLODIPine  10 mg Oral QPM    sodium chloride flush  5-40 mL IntraVENous 2 times per day    heparin (porcine)  5,000 Units SubCUTAneous 3 times per day    metoclopramide  10 mg IntraVENous Q6H    pantoprazole (PROTONIX) 40 mg injection  40 mg IntraVENous BID     Continuous Infusions:    dextrose 5 % and 0.9 % NaCl      insulin Stopped (11/04/22 0602)    dextrose       PRN Meds:  glucagon (rDNA), HYDROmorphone, dextrose bolus **OR** dextrose bolus, potassium chloride, magnesium sulfate, sodium phosphate IVPB **OR** sodium phosphate IVPB **OR** sodium phosphate IVPB, promethazine, clonazePAM, sodium chloride flush, polyethylene glycol, glucose, dextrose bolus **OR** dextrose bolus, glucagon (rDNA), dextrose, hydrOXYzine HCl, labetalol, dicyclomine    Review of Systems:     Constitutional: No fever, no chills, no lethargy, no weakness. HEENT:  No headache, otalgia, itchy eyes, nasal discharge or sore throat. Cardiac:  No chest pain, dyspnea, orthopnea or PND. Chest:   No cough, phlegm or wheezing. Abdomen:  No abdominal pain, nausea or vomiting. Neuro:  No focal weakness, abnormal movements or seizure like activity. Skin:   No rashes, no itching. :   No hematuria, no pyuria, no dysuria, no flank pain. Extremities:  No swelling or joint pains. ROS was otherwise negative except as mentioned in the [address] Input/Output:       No intake/output data recorded. Vital Signs:   Temperature:  Temp: 98.1 °F (36.7 °C)  TMax:   Temp (24hrs), Av °F (36.7 °C), Min:97.3 °F (36.3 °C), Max:98.2 °F (36.8 °C)    Respirations:  Resp: 13  Pulse:   Heart Rate: 85  BP:    BP: 135/84  BP Range: Systolic (01KEG), GEY:416 , Min:124 , HUY:628       Diastolic (38ETQ), FOJ:84, Min:71, Max:84      Physical Examination:     General:  AAO x 3, speaking in full sentences, no accessory muscle use. HEENT: Atraumatic, normocephalic, no throat congestion, moist mucosa. Eyes:   Pupils equal, round and reactive to light, EOMI. Neck:   Supple  Chest:   Bilateral vesicular breath sounds, no rales or wheezes. Cardiac:  S1 S2 RR, no murmurs, gallops or rubs. Abdomen: Soft, non-tender, no masses or organomegaly, BS audible. :   No suprapubic or flank tenderness. Neuro:  AAO x 3, No FND. SKIN:  No rashes, good skin turgor. Extremities:  No edema.     Labs:       Recent Labs     22  1126 22  0638 22  0629 22  0859   WBC 10.8 11.1 15.0* 9.4   RBC 3.55* 3.09* 3.38* 2.81*   HGB 9.9* 8.7* 9. 3* 7.9*   HCT 29.6* 26.0* 29.6* 24.0*   MCV 83.4 84.1 87.6 85.4   MCH 27.9 28.2 27.5 28.1   MCHC 33.4 33.5 31.4 32.9   RDW 13.6 13.9 13.7 13.9    244 267 See Reflexed IPF Result   MPV 9.4 9.4 10.3  --       BMP:   Recent Labs     11/04/22  0009 11/04/22  0420 11/04/22  0859   * 133* 131*   K 3.6* 3.2* 3.4*    105 103   CO2 17* 14* 13*   BUN 26* 26* 25*   CREATININE 2.42* 2.59* 2.59*   GLUCOSE 71 145* 144*   CALCIUM 7.7* 7.3* 7.3*      Phosphorus:     Recent Labs     11/04/22  0009 11/04/22  0420 11/04/22  0859   PHOS 2.0* 3.5 3.4     Magnesium:    Recent Labs     11/04/22  0009 11/04/22  0420 11/04/22  0859   MG 1.8 1.7 2.0     Albumin:    Recent Labs     11/01/22  1126   LABALBU 3.8     BNP:    No results found for: BNP  FRANCISCO:    No results found for: FRANCISCO  SPEP:  Lab Results   Component Value Date/Time    PROT 7.7 11/01/2022 11:26 AM     UPEP:   No results found for: LABPE  C3:   No results found for: C3  C4:   No results found for: C4  MPO ANCA:   No results found for: MPO  PR3 ANCA:   No results found for: PR3  Anti-GBM:   No results found for: GBMABIGG  Hep BsAg:       No results found for: HEPBSAG  Hep C AB:        No results found for: HEPCAB    Urinalysis/Chemistries:      Lab Results   Component Value Date/Time    NITRU NEGATIVE 11/04/2022 02:11 AM    COLORU Yellow 11/04/2022 02:11 AM    PHUR 5.5 11/04/2022 02:11 AM    LABCAST 10-20 Hyaline 07/22/2016 01:30 AM    WBCUA 5 TO 10 11/04/2022 02:11 AM    RBCUA TOO NUMEROUS TO COUNT 11/04/2022 02:11 AM    MUCUS 1+ 11/04/2022 02:11 AM    YEAST Present 07/22/2016 01:30 AM    BACTERIA FEW 11/01/2022 04:10 PM    CLARITYU CLOUDY 07/22/2016 01:30 AM    SPECGRAV 1.012 11/04/2022 02:11 AM    LEUKOCYTESUR NEGATIVE 11/04/2022 02:11 AM    UROBILINOGEN Normal 11/04/2022 02:11 AM    BILIRUBINUR NEGATIVE 11/04/2022 02:11 AM    BLOODU LARGE 07/22/2016 01:30 AM    GLUCOSEU NEGATIVE 11/04/2022 02:11 AM    KETUA SMALL 11/04/2022 02:11 AM     Urine Sodium:   No results found for: GAMA  Urine Potassium:  No results found for: KUR  Urine Chloride:  No results found for: CLUR  Urine Osmolarity: No results found for: OSMOU  Urine Protein:   No results found for: TPU  Urine Creatinine:         WBC 6.7 03/18/2022   HGB 10.5 (L) 03/18/2022   HCT 31.6 (L) 03/18/2022    03/18/2022   IRON 60 11/01/2019   TIBC 242 (L) 11/01/2019   LABIRON 24.8 11/01/2019   FERRITIN 57.1 11/01/2019   GLUCOSE 130 (H) 03/18/2022   CREATININE 2.53 (H) 03/18/2022    03/18/2022   K 5.0 03/18/2022    03/18/2022   CO2 28 03/18/2022   CALCIUM 8.5 (L) 03/18/2022   PHOS 3.1 03/18/2022   PTH 88.0 03/18/2022    08/08/2019   BILITOT 0.5 03/18/2022   PROT 7.3 03/18/2022   AST 36 08/07/2019   ALT 15 03/18/2022   ALKPHOS 69 03/18/2022   CANCA <1:20 08/08/2019   ANCAMYELO <9.0 08/08/2019   ANCEPROT3 <3.5 08/08/2019   C4 31 08/08/2019          Lab Results   Component Value Date/Time    LABCREA 111.1 11/04/2022 02:11 AM     UPC:     Urine Eosinophils:  No components found for: UEOS    Radiology:     Reviewed. Assessment:     1. Acute Kidney Injury, likely secondary to prerenal 2/2 nausea, vomiting, poor oral intake, diuresis related to hyperglycemia. Cannot rule out ATN related to hypoperfusion related to chronic anemia and low blood pressure. 2.  Chronic kidney disease stage IIIa, likely secondary to diabetic nephrosclerosis, last known proteinuria <1g. Baseline around 1.8.    3.  Anemia of chronic disease  4. Type 1 diabetes mellitus (on insulin pump)  5. Hypertension  6. CT A/P concerning for pyelonephritis or likely renal infarct because of symmetric stranding of fat around the kidneys. 7.  Reflux esophagitis, small sliding hiatal hernia, esophageal nodule on EGD. On PPI  8. On Reglan 10 Mg every 6 hourly for diabetic gastroparesis? Plan:   1. Recommend continuing IV fluids, okay with normal saline and D5 at 100 cc/h for now.   Follow-up on repeat BMP results and adjust fluid accordingly. Expect improvement in renal functions. 2.  Replace electrolytes as appropriate. 3.  Renal ultrasound ruled out hydronephrosis or nephrolithiasis. Nutrition   Please ensure that patient is on a renal diet/TF. Avoid nephrotoxic drugs/contrast exposure. Thank you for the consultation. Please do not hesitate to contact us for any further questions/concerns. We will continue to follow along with you. Andrey Saldivar MD  Internal Medicine Resident, PGY-2  Nephrology Inpatient Service  [facility]; [place], [place]  11/4/2022, 10:46 AM      This note is created with the assistance of a speech-recognition program. While intending to generate a document that actually reflects the content of the visit, no guarantees can be provided that every mistake has been identified and corrected by editing. Attending Physician Statement  I have discussed the care of [facility], including pertinent history and exam findings with the resident/fellow. I have reviewed the key elements of all parts of the encounter with the resident/fellow. I have seen and examined the patient with the resident/fellow. I agree with the assessment and plan and status of the problem list as documented. Addiitionally I recommend hopefully giving eventually office records from her nephrologist in [place]. The patient states her baseline creatinine is 2.1, although I do not think that is completely accurate based upon her presenting serum creatinine to this hospital.  She said she has a history of type 1 diabetes mellitus with retinopathy and nephropathy. She has acute on chronic kidney injury on this admission. Creatinine has been relatively stable last 24 hours. Electrolytes are reasonable, although she does have a hyperchloremic metabolic acidosis. She continues on room air. She has been order regular diet.   She was ordered IV replacement potassium this morning which I switched oral based on the fact that she is now taking an oral food/fluids/medications. The patient can have some at least moderate improvement in renal function the goal would be to her discharge the patient soon. The patient would then require follow up in our office post discharge as she is now living in the [facility] area.      Quintin Hoffman MD   Nephrology Attending Physician  [facility]  11/4/2022

## 2022-11-04 NOTE — PROGRESS NOTES
IV potassium replacement stopped at bag 3 per Dr. Laura Horowitz. Per Dr. Laura Horowitz, he will give her the rest PO.

## 2022-11-04 NOTE — PROGRESS NOTES
[facility]  Internal Medicine Teaching Residency Program  Inpatient Daily Progress Note  ______________________________________________________________________________    Patient: Binu Cortez  YOB: 1985   ZXC:[id number]    Acct: [de-identified]     Room: 2017/2017-01  Admit date: 11/1/2022  Today's date: 11/04/22  Number of days in the hospital: 3    SUBJECTIVE   Admitting Diagnosis: Hematemesis  CC: Bloody emesis, abdominal pain    - Pt examined at bedside. Chart & results reviewed. Patient resting comfortably on bed  Patient denies any nausea, hematemesis and vomiting for past 24 hours. Patient states that her appetite is good  Patient denies chest pain, lightheadedness and headache  Patient saturating well at room air  No acute events overnight    Review of Systems   Constitutional:  Positive for fatigue. Negative for appetite change, chills, diaphoresis and fever. HENT: Negative. Eyes: Negative. Respiratory:  Negative for apnea, cough and shortness of breath. Cardiovascular:  Negative for chest pain, palpitations and leg swelling. Gastrointestinal:  Negative for abdominal pain, constipation, diarrhea, nausea and vomiting. Endocrine: Negative. Genitourinary:  Negative for dysuria, flank pain and urgency. Musculoskeletal: Negative. Skin: Negative. Allergic/Immunologic: Negative. Neurological: Negative. Hematological: Negative. Psychiatric/Behavioral: Negative. BRIEF HISTORY     The patient is a pleasant 44 y.o. female presents with a chief complaint of nausea, hematemesis, and abdominal pain. The patient was recently admitted to the hospital with the same complaints which she had episodes of nonbloody vomiting in the morning following by hematemesis and abdominal pain. During the previous admission, her hemoglobin was 10.5, creatinine was 1.45 which was baseline, lipase was normal, occult blood was positive.   CT abdomen showed pyelonephritis/renal infarct. She was started on IV ceftriaxone. She was also given IV PPI twice daily due to hematemesis likely secondary to Delmis-Suarez tear. GI was consulted who recommended conservative treatment and not EGD with Protonix 40 mg IV twice daily for 8 weeks and change to p.o. when the patient tolerates. After this, the patient left AMA. The patient presented to ED today with the similar complaints with increased intensity of abdominal pain and vomiting frequency. During evaluation, she had hematemesis with coffee-ground component/dark digested blood component in her vomitus. She also complains of abdominal and bilateral flank pain. The patient reports that she was tolerating liquids yesterday but today, she woke up and started throwing up uncontrollably. She also complains of chills. During examination, the patient had diffuse abdominal tenderness. The patient was again started on ceftriaxone IV 1 dose. OBJECTIVE     Vital Signs:  BP (!) 152/84   Pulse 92   Temp 97.8 °F (36.6 °C) (Oral)   Resp 13   Ht 5' 1\" (1.549 m)   Wt 208 lb (94.3 kg)   LMP  (LMP Unknown)   SpO2 98%   BMI 39.30 kg/m²     Temp (24hrs), Av.1 °F (36.7 °C), Min:97.8 °F (36.6 °C), Max:98.2 °F (36.8 °C)    In: -   Out: 400 [Urine:400]    Physical Exam  Constitutional:       General: She is awake. Cardiovascular:      Heart sounds: Normal heart sounds. Pulmonary:      Effort: Pulmonary effort is normal.      Breath sounds: Normal breath sounds. Chest:      Chest wall: No swelling or tenderness. Abdominal:      Palpations: Abdomen is soft. Tenderness: There is no abdominal tenderness. There is no guarding or rebound. Musculoskeletal:      Right lower leg: No edema. Left lower leg: No edema. Neurological:      Mental Status: She is alert and oriented to person, place, and time.    Psychiatric:         Attention and Perception: Attention normal.         Mood and Affect: Mood normal.         Behavior: Behavior is cooperative.      Medications:  Scheduled Medications:    potassium chloride  10 mEq IntraVENous Q1H    [Held by provider] Insulin Pump - Bolus Dose   SubCUTAneous 4x Daily AC & HS    [Held by provider] Insulin Pump - Basal Dose   SubCUTAneous Daily    sucralfate  1 g Oral 4 times per day    amLODIPine  10 mg Oral QPM    sodium chloride flush  5-40 mL IntraVENous 2 times per day    heparin (porcine)  5,000 Units SubCUTAneous 3 times per day    metoclopramide  10 mg IntraVENous Q6H    pantoprazole (PROTONIX) 40 mg injection  40 mg IntraVENous BID     Continuous Infusions:    dextrose 5 % and 0.9 % NaCl 100 mL/hr at 11/04/22 1125    insulin Stopped (11/04/22 0602)    dextrose       PRN Medicationsglucagon (rDNA), 1 mg, PRN  HYDROmorphone, 0.5 mg, Q4H PRN  dextrose bolus, 125 mL, PRN   Or  dextrose bolus, 250 mL, PRN  potassium chloride, 10 mEq, PRN  magnesium sulfate, 1,000 mg, PRN  sodium phosphate IVPB, 10 mmol, PRN   Or  sodium phosphate IVPB, 15 mmol, PRN   Or  sodium phosphate IVPB, 20 mmol, PRN  promethazine, 6.25 mg, Q4H PRN  clonazePAM, 0.5 mg, Q12H PRN  sodium chloride flush, 5-40 mL, PRN  polyethylene glycol, 17 g, Daily PRN  glucose, 4 tablet, PRN  dextrose bolus, 125 mL, PRN   Or  dextrose bolus, 250 mL, PRN  glucagon (rDNA), 1 mg, PRN  dextrose, , Continuous PRN  hydrOXYzine HCl, 10 mg, TID PRN  labetalol, 10 mg, Q6H PRN  dicyclomine, 20 mg, 4x Daily PRN        Diagnostic Labs:  CBC:   Recent Labs     11/02/22  0638 11/03/22  0629 11/04/22  0859   WBC 11.1 15.0* 9.4   RBC 3.09* 3.38* 2.81*   HGB 8.7* 9.3* 7.9*   HCT 26.0* 29.6* 24.0*   MCV 84.1 87.6 85.4   RDW 13.9 13.7 13.9    267 See Reflexed IPF Result     BMP:   Recent Labs     11/04/22  0009 11/04/22  0420 11/04/22  0859   * 133* 131*   K 3.6* 3.2* 3.4*    105 103   CO2 17* 14* 13*   PHOS 2.0* 3.5 3.4   BUN 26* 26* 25*   CREATININE 2.42* 2.59* 2.59*     BNP: No results for input(s): BNP in the last 72 hours. PT/INR: No results for input(s): PROTIME, INR in the last 72 hours. APTT: No results for input(s): APTT in the last 72 hours. CARDIAC ENZYMES: No results for input(s): CKMB, CKMBINDEX, TROPONINI in the last 72 hours. Invalid input(s): CKTOTAL;3  FASTING LIPID PANEL:No results found for: CHOL, HDL, TRIG  LIVER PROFILE: No results for input(s): AST, ALT, ALB, BILIDIR, BILITOT, ALKPHOS in the last 72 hours. MICROBIOLOGY:   Lab Results   Component Value Date/Time    CULTURE NO SIGNIFICANT GROWTH 11/01/2022 04:23 PM       Imaging:    CT ABDOMEN PELVIS WO CONTRAST Additional Contrast? None    Result Date: 10/30/2022  Symmetric stranding of fat around the kidneys. Correlate for clinical signs of pyelonephritis or renal infarct. Trace pelvic free fluid. XR ABDOMEN (KUB) (SINGLE AP VIEW)    Result Date: 11/3/2022  No acute process     XR ABDOMEN (KUB) (SINGLE AP VIEW)    Result Date: 11/1/2022  No acute abdominal abnormality by radiograph. US RENAL COMPLETE    Result Date: 11/4/2022  Unremarkable renal ultrasound. No hydronephrosis. US GALLBLADDER RUQ    Result Date: 11/2/2022  Unremarkable right upper quadrant ultrasound. XR CHEST PORTABLE    Result Date: 10/30/2022  No evidence of acute pulmonary abnormality seen. No radiographic evidence of intra-free air is seen beneath the diaphragm. ASSESSMENT & PLAN     ASSESSMENT / PLAN:     Ketoacidosis due to diabetes ([address]  Plan:    VBG reveals metabolic acidosis with pH 7.23, pCO2 35, HCO3 13.  AG 15  POC glucose 144  Patient feeling better, able to tolerate diet  Document strictly input and output  Patient currently on DKA protocol with normal saline and D5 100ml/hr  Restarting insulin drip    JORGE (acute kidney injury) ([address]  Plan:   Creatinine 2.59, BUN 25  Nephrology consulted, appreciate recommendations  Follow-up with protein creatinine ratio and urine  Follow-up with sodium urine  Urine creatinine 111  Unremarkable renal ultrasound, no hydronephrosis    Hematemesis  Plan:   Hematemesis resolved, but patient still having 1-2 episode of vomiting, no blood in it  GI following, appreciate recommendations  GI performed EGD which showed  Reflux esophagitis and lower esophagus, esophageal nodule at GE junction, biopsy was done follow-up with biopsy. Stomach showed small sliding hiatal hernia otherwise stomach was normal  Duodenum was also normal  Per GI, continue PPI twice daily   Repeat upper endoscopy in 4 to 6 weeks to check healing of esophageal nodule  Reglan injection 10 Mg every 6 hours  X-ray abdomen negative for perforation  Unremarkable right upper quadrant ultrasound  Promethazine as needed  GI signed off    Pyelonephritis  Plan:    WBC is 15.0 ->9.4  UA suggestive of UTI  Follow-up with urine culture-no significant growth  Abdominal CT done on last admission was suggestive of pyelonephritis or infarct. Diet: Adult regular  DVT ppx : Heparin subcu 5000 units 3 times daily  GI ppx: Protonix 40 Mg IV twice daily    Discharge Planning:  following, disposition Kaila Cool M.D. Internal Medicine Resident PGY-1  [facility]. 11:54 AM 11/4/2022     Please note that part of this chart was generated using voice recognition dictation software. Although every effort was made to ensure the accuracy of this automated transcription, some errors in transcription may have occurred.

## 2022-11-04 NOTE — DISCHARGE SUMMARY
[address]     Department of Internal Medicine - Staff Internal Medicine Teaching Service    INPATIENT DISCHARGE SUMMARY      Patient Identification:  Rashaad Diaz is a 44 y.o. female. :  1985  MRN: [id number]     Acct: [de-identified]   PCP: No primary care provider on file. Admit Date:  10/30/2022  Discharge date and time: 10/31/2022  3:45 PM   Attending Provider: No att. providers found                                     [address] Problem Lists:  Principal Problem:    Hematemesis  Active Problems:    Type 1 diabetes mellitus with stage 3a chronic kidney disease (HCC)    Pyelonephritis    Normocytic anemia    JORGE (acute kidney injury) ([facility]    Insulin pump in place    Proteinuria due to type 1 diabetes mellitus ([facility]    Anxiety    Anemia of chronic disease  Resolved Problems:    * No resolved hospital problems. *      HOSPITAL STAY     Brief Inpatient course:   Patient, 44years old female, presents to the hospital with hematemesis. Patient stated that she was having episodes of nonbloody vomiting in the morning, followed by abdominal pain and hematemesis. In ED, her hemoglobin was 10.5. Her creatinine was 1.45 which is somewhat at baseline. Lipase was normal.  Her occult blood test came back positive. Chest x-ray was done which is negative for any acute abnormality. CT abdomen showed pyelonephritis or renal infarct. No other abnormality seen. Patient was given 80 Mg of pantoprazole IV in ED. GI was consulted , who recommended conservative treatment and no need for any EGD. Patient requested to leave AMA, despite being counseled regarding possible outcomes like DKA. Patient did understand the outcomes but still wanted to leave AMA.      Procedures/ Significant Interventions:    None    Consults:     Consults:     Final Specialist Recommendations/Findings:   IP CONSULT TO GI  IP CONSULT TO INTERNAL MEDICINE  IP CONSULT TO CASE MANAGEMENT Any Hospital Acquired Infections: none    Discharge Functional Status:  stable    DISCHARGE PLAN     Disposition: AMA - home per     Patient Instructions:   Discharge Medication List as of 10/31/2022  3:46 PM        CONTINUE these medications which have NOT CHANGED    Details   LOSARTAN POTASSIUM PO Take by mouthHistorical Med      amLODIPine (NORVASC) 10 MG tablet Take 10 mg by mouth every eveningHistorical Med      gabapentin (NEURONTIN) 300 MG capsule Take 300 mg by mouth 3 times daily. Historical Med      ATORVASTATIN CALCIUM PO Take by mouth every eveningHistorical Med      cetirizine (ZYRTEC) 10 MG tablet Take 10 mg by mouth every eveningHistorical Med      vitamin D (CHOLECALCIFEROL) 1000 UNIT TABS tablet Take 1,000 Units by mouth once a week Indications: on sundaysHistorical Med      Multiple Vitamins-Minerals (THERAPEUTIC MULTIVITAMIN-MINERALS) tablet Take 1 tablet by mouth dailyHistorical Med      ondansetron (ZOFRAN ODT) 4 MG disintegrating tablet Take 1 tablet by mouth every 8 hours as needed for Nausea or Vomiting, Disp-12 tablet, R-0      AMITRIPTYLINE HCL PO Take by mouth      Insulin Infusion Pump BERTA Until Discontinued, Historical Med      insulin aspart (NOVOLOG) 100 UNIT/ML injection vial Inject into the skin 3 times daily (before meals) Sliding scale             Activity: N/a    Diet:  N/a    Follow-up:    No follow-up provider specified.     Patient Instructions: N/a  Follow up labs: N/a  Follow up imaging: N/a    Note that over 30 minutes was spent in preparing discharge papers, discussing discharge with patient, medication review, etc.      Yessenia Govea MD  Internal Medicine Resident, PGY-1  [address], [place].  11/3/2022, 10:42 PM

## 2022-11-04 NOTE — PLAN OF CARE
Patients repeat BMP and VBG reveals metabolic acidosis with pH 7.23, pCO2 35, HCO3 13. AG 15, K+ 3.4, Na+131, Cr 2.59, Glycemia 144. Hb 7.9. Insulin drip had been stopped and patient bridged with order to restart patient's home insulin pump. Patient feeling better. Able to tolerate oral diet. Input and output has not been documented. Patient going back into DKA. Will restart DKA protocol with NS/D5 at 100ml/hr as patient's Cr getting worse. Restart insulin drip. Hold home insulin pump for now  Pending Nephrology recommendations  Check lactic acid and beta-hydroxybutyrate. Discussed with Nurse. Will follow BMP in 4 hrs. Lu Mcdaniel MD  Internal Medicine Resident, [facility];  [place], [place]  11/4/2022, 10:59 AM

## 2022-11-04 NOTE — TELEPHONE ENCOUNTER
----- Message from KARON Corey - CNP sent at 11/4/2022  6:18 AM EDT -----  Regarding: Needs EGD  Per Dr Matheus Fields -Patient will need a repeat upper endoscopy in 4 to 6 weeks to check the nodule.     Thanks, Rafaela Black

## 2022-11-04 NOTE — CARE COORDINATION
11/04/22 5107   Service Assessment   Patient Orientation Alert and Oriented   Cognition Alert   History Provided By Patient   Primary Caregiver Self   Support Systems Spouse/Significant Other;Family Members   PCP Verified by CM Yes   Last Visit to PCP Within last year   Prior Functional Level Independent in ADLs/IADLs   Current Functional Level Independent in ADLs/IADLs   Can patient return to prior living arrangement Yes   Ability to make needs known: Good   Family able to assist with home care needs: Yes   Would you like for me to discuss the discharge plan with any other family members/significant others, and if so, who? Yes   Financial Resources Medicaid   Social/Functional History   Lives With Other (comment)   Type of [place] [place] to enter with rails   Entrance Stairs - Number of Steps 3   Bathroom Shower/Tub Tub/Shower unit   Bathroom Toilet Standard   Bathroom Accessibility Accessible   ADL Assistance Independent   Homemaking Assistance Independent   Homemaking Responsibilities Yes   Ambulation Assistance Independent   Transfer Assistance Independent   Active  Yes   Discharge Planning   Type of [address] Discharge   Confirm Follow Up Transport Friends   Condition of Participation: Discharge [address] for Transition of Care is related to the following treatment goals: control N/V, infection management   The Patient and/or Patient Representative was provided with a Choice of Provider? Patient   The Patient and/Or Patient Representative agree with the Discharge Plan? Yes     Home with s/o - independent. No skilled needs identified - has ride home.

## 2022-11-04 NOTE — PROGRESS NOTES
Diabetes Education - Insulin Pump use as Inpatient  Follow up / rounded - pt off unit for [facility] [place] - spoke with Kassi Burch bedside RN, and patient did resume insulin pump from home and off insulin drip this am.     Chart reviewed with Kassi Burch and one time order to use patient supplied insulin pump was notes. For best care practice need to have subcutaneous patient supplied insulin pump order set be used. Kassi Burch sent IM resident message to request. Order set needed for documentation of insulin basal rate and bolus in EHR. Both documentation sheets per policy are at bedside and in use - Inpatient Insulin Pump Patient responsibility form and Inpatient insulin pump communication tool. Discussed nursing responsibility to document on LAD the insulin pump site, patient ability to operate system, and basal rate every shift, use HM for bolus corrections and ongoing communication with patient on glucose status.      Diabetes education team available for follow up needs 8- 4 Monday - Friday    ASAD Hammond RN  [facility] [place] [facility] [place]

## 2022-11-04 NOTE — TELEPHONE ENCOUNTER
Patient had an EGD on 11/2/22 by Dr. Neo Frias.  The patient should be followed by Ruperto Miles. Please call the patient to schedule.

## 2022-11-04 NOTE — CARE COORDINATION
11/04/22 0932   Readmission Assessment   Number of Days since last admission? 1-7 days   Previous Disposition Other (comment)  (AMA)   Who is being Interviewed Patient   Did you visit your Primary Care Physician after you left the hospital, before you returned this time?  No   Did you see a specialist, such as Cardiac, Pulmonary, Orthopedic Physician, etc. after you left the hospital? No   Who advised the patient to return to the hospital? Self-referral

## 2022-11-05 PROBLEM — R79.89 LOW SERUM BICARBONATE: Status: ACTIVE | Noted: 2022-11-05

## 2022-11-05 PROBLEM — N18.32 STAGE 3B CHRONIC KIDNEY DISEASE (HCC): Status: ACTIVE | Noted: 2022-11-05

## 2022-11-05 LAB
ANION GAP SERPL CALCULATED.3IONS-SCNC: 10 MMOL/L (ref 9–17)
ANION GAP SERPL CALCULATED.3IONS-SCNC: 12 MMOL/L (ref 9–17)
ANION GAP SERPL CALCULATED.3IONS-SCNC: 13 MMOL/L (ref 9–17)
ANION GAP SERPL CALCULATED.3IONS-SCNC: 14 MMOL/L (ref 9–17)
BETA-HYDROXYBUTYRATE: 2.44 MMOL/L (ref 0.02–0.27)
BUN BLDV-MCNC: 13 MG/DL (ref 6–20)
BUN BLDV-MCNC: 16 MG/DL (ref 6–20)
BUN BLDV-MCNC: 19 MG/DL (ref 6–20)
BUN BLDV-MCNC: 21 MG/DL (ref 6–20)
CALCIUM SERPL-MCNC: 7.5 MG/DL (ref 8.6–10.4)
CALCIUM SERPL-MCNC: 7.8 MG/DL (ref 8.6–10.4)
CHLORIDE BLD-SCNC: 105 MMOL/L (ref 98–107)
CHLORIDE BLD-SCNC: 110 MMOL/L (ref 98–107)
CHLORIDE BLD-SCNC: 111 MMOL/L (ref 98–107)
CHLORIDE BLD-SCNC: 112 MMOL/L (ref 98–107)
CHLORIDE, UR: 55 MMOL/L
CO2: 12 MMOL/L (ref 20–31)
CO2: 15 MMOL/L (ref 20–31)
CO2: 15 MMOL/L (ref 20–31)
CO2: 18 MMOL/L (ref 20–31)
CREAT SERPL-MCNC: 1.74 MG/DL (ref 0.5–0.9)
CREAT SERPL-MCNC: 1.84 MG/DL (ref 0.5–0.9)
CREAT SERPL-MCNC: 2.09 MG/DL (ref 0.5–0.9)
CREAT SERPL-MCNC: 2.31 MG/DL (ref 0.5–0.9)
CULTURE: NORMAL
GFR SERPL CREATININE-BSD FRML MDRD: 27 ML/MIN/1.73M2
GFR SERPL CREATININE-BSD FRML MDRD: 31 ML/MIN/1.73M2
GFR SERPL CREATININE-BSD FRML MDRD: 36 ML/MIN/1.73M2
GFR SERPL CREATININE-BSD FRML MDRD: 38 ML/MIN/1.73M2
GLUCOSE BLD-MCNC: 115 MG/DL (ref 65–105)
GLUCOSE BLD-MCNC: 116 MG/DL (ref 65–105)
GLUCOSE BLD-MCNC: 117 MG/DL (ref 70–99)
GLUCOSE BLD-MCNC: 125 MG/DL (ref 65–105)
GLUCOSE BLD-MCNC: 143 MG/DL (ref 65–105)
GLUCOSE BLD-MCNC: 144 MG/DL (ref 65–105)
GLUCOSE BLD-MCNC: 160 MG/DL (ref 65–105)
GLUCOSE BLD-MCNC: 161 MG/DL (ref 65–105)
GLUCOSE BLD-MCNC: 163 MG/DL (ref 70–99)
GLUCOSE BLD-MCNC: 165 MG/DL (ref 65–105)
GLUCOSE BLD-MCNC: 167 MG/DL (ref 65–105)
GLUCOSE BLD-MCNC: 169 MG/DL (ref 65–105)
GLUCOSE BLD-MCNC: 175 MG/DL (ref 70–99)
GLUCOSE BLD-MCNC: 176 MG/DL (ref 65–105)
GLUCOSE BLD-MCNC: 216 MG/DL (ref 65–105)
GLUCOSE BLD-MCNC: 267 MG/DL (ref 70–99)
GLUCOSE BLD-MCNC: 40 MG/DL (ref 65–105)
GLUCOSE BLD-MCNC: 41 MG/DL (ref 65–105)
GLUCOSE BLD-MCNC: 47 MG/DL (ref 65–105)
GLUCOSE BLD-MCNC: 68 MG/DL (ref 65–105)
Lab: NORMAL
MAGNESIUM: 1.8 MG/DL (ref 1.6–2.6)
MAGNESIUM: 2.1 MG/DL (ref 1.6–2.6)
MAGNESIUM: 2.2 MG/DL (ref 1.6–2.6)
PHOSPHORUS: 2.8 MG/DL (ref 2.6–4.5)
PHOSPHORUS: 3.7 MG/DL (ref 2.6–4.5)
PHOSPHORUS: 3.8 MG/DL (ref 2.6–4.5)
POTASSIUM SERPL-SCNC: 4 MMOL/L (ref 3.7–5.3)
POTASSIUM SERPL-SCNC: 4 MMOL/L (ref 3.7–5.3)
POTASSIUM SERPL-SCNC: 4.2 MMOL/L (ref 3.7–5.3)
POTASSIUM SERPL-SCNC: 4.4 MMOL/L (ref 3.7–5.3)
POTASSIUM, UR: 7.6 MMOL/L
SODIUM BLD-SCNC: 133 MMOL/L (ref 135–144)
SODIUM BLD-SCNC: 136 MMOL/L (ref 135–144)
SODIUM BLD-SCNC: 137 MMOL/L (ref 135–144)
SODIUM BLD-SCNC: 141 MMOL/L (ref 135–144)
SODIUM,UR: 48 MMOL/L
SPECIMEN DESCRIPTION: NORMAL

## 2022-11-05 PROCEDURE — 2060000000 HC ICU INTERMEDIATE R&B

## 2022-11-05 PROCEDURE — A4216 STERILE WATER/SALINE, 10 ML: HCPCS | Performed by: INTERNAL MEDICINE

## 2022-11-05 PROCEDURE — 99232 SBSQ HOSP IP/OBS MODERATE 35: CPT | Performed by: INTERNAL MEDICINE

## 2022-11-05 PROCEDURE — 6360000002 HC RX W HCPCS

## 2022-11-05 PROCEDURE — 6360000002 HC RX W HCPCS: Performed by: INTERNAL MEDICINE

## 2022-11-05 PROCEDURE — 80048 BASIC METABOLIC PNL TOTAL CA: CPT

## 2022-11-05 PROCEDURE — 99291 CRITICAL CARE FIRST HOUR: CPT | Performed by: INTERNAL MEDICINE

## 2022-11-05 PROCEDURE — 84300 ASSAY OF URINE SODIUM: CPT

## 2022-11-05 PROCEDURE — 6370000000 HC RX 637 (ALT 250 FOR IP): Performed by: INTERNAL MEDICINE

## 2022-11-05 PROCEDURE — 84100 ASSAY OF PHOSPHORUS: CPT

## 2022-11-05 PROCEDURE — 2580000003 HC RX 258: Performed by: INTERNAL MEDICINE

## 2022-11-05 PROCEDURE — 83735 ASSAY OF MAGNESIUM: CPT

## 2022-11-05 PROCEDURE — 82436 ASSAY OF URINE CHLORIDE: CPT

## 2022-11-05 PROCEDURE — 84133 ASSAY OF URINE POTASSIUM: CPT

## 2022-11-05 PROCEDURE — 6370000000 HC RX 637 (ALT 250 FOR IP): Performed by: NURSE PRACTITIONER

## 2022-11-05 PROCEDURE — 36415 COLL VENOUS BLD VENIPUNCTURE: CPT

## 2022-11-05 PROCEDURE — 2580000003 HC RX 258

## 2022-11-05 PROCEDURE — 82947 ASSAY GLUCOSE BLOOD QUANT: CPT

## 2022-11-05 PROCEDURE — C9113 INJ PANTOPRAZOLE SODIUM, VIA: HCPCS | Performed by: INTERNAL MEDICINE

## 2022-11-05 PROCEDURE — 6370000000 HC RX 637 (ALT 250 FOR IP)

## 2022-11-05 RX ORDER — INSULIN LISPRO 100 [IU]/ML
0-4 INJECTION, SOLUTION INTRAVENOUS; SUBCUTANEOUS
Status: DISCONTINUED | OUTPATIENT
Start: 2022-11-05 | End: 2022-11-05

## 2022-11-05 RX ORDER — INSULIN LISPRO 100 [IU]/ML
0-8 INJECTION, SOLUTION INTRAVENOUS; SUBCUTANEOUS
Status: DISCONTINUED | OUTPATIENT
Start: 2022-11-06 | End: 2022-11-06 | Stop reason: HOSPADM

## 2022-11-05 RX ORDER — DEXTROSE AND SODIUM CHLORIDE 5; .9 G/100ML; G/100ML
INJECTION, SOLUTION INTRAVENOUS CONTINUOUS
Status: DISCONTINUED | OUTPATIENT
Start: 2022-11-05 | End: 2022-11-06

## 2022-11-05 RX ORDER — SODIUM BICARBONATE 650 MG/1
1300 TABLET ORAL 2 TIMES DAILY
Status: DISCONTINUED | OUTPATIENT
Start: 2022-11-05 | End: 2022-11-06 | Stop reason: HOSPADM

## 2022-11-05 RX ORDER — METOCLOPRAMIDE HYDROCHLORIDE 5 MG/ML
10 INJECTION INTRAMUSCULAR; INTRAVENOUS EVERY 6 HOURS PRN
Status: DISCONTINUED | OUTPATIENT
Start: 2022-11-05 | End: 2022-11-06

## 2022-11-05 RX ORDER — INSULIN LISPRO 100 [IU]/ML
0-4 INJECTION, SOLUTION INTRAVENOUS; SUBCUTANEOUS NIGHTLY
Status: DISCONTINUED | OUTPATIENT
Start: 2022-11-05 | End: 2022-11-06 | Stop reason: HOSPADM

## 2022-11-05 RX ORDER — INSULIN LISPRO 100 [IU]/ML
0-4 INJECTION, SOLUTION INTRAVENOUS; SUBCUTANEOUS NIGHTLY
Status: DISCONTINUED | OUTPATIENT
Start: 2022-11-05 | End: 2022-11-05

## 2022-11-05 RX ADMIN — PROMETHAZINE HYDROCHLORIDE 6.25 MG: 25 INJECTION INTRAMUSCULAR; INTRAVENOUS at 17:01

## 2022-11-05 RX ADMIN — PROMETHAZINE HYDROCHLORIDE 6.25 MG: 25 INJECTION INTRAMUSCULAR; INTRAVENOUS at 07:12

## 2022-11-05 RX ADMIN — METOCLOPRAMIDE 10 MG: 5 INJECTION, SOLUTION INTRAMUSCULAR; INTRAVENOUS at 08:39

## 2022-11-05 RX ADMIN — SODIUM CHLORIDE, PRESERVATIVE FREE 10 ML: 5 INJECTION INTRAVENOUS at 08:41

## 2022-11-05 RX ADMIN — HYDROMORPHONE HYDROCHLORIDE 0.5 MG: 1 INJECTION, SOLUTION INTRAMUSCULAR; INTRAVENOUS; SUBCUTANEOUS at 16:17

## 2022-11-05 RX ADMIN — SODIUM CHLORIDE, PRESERVATIVE FREE 40 MG: 5 INJECTION INTRAVENOUS at 20:40

## 2022-11-05 RX ADMIN — PROMETHAZINE HYDROCHLORIDE 6.25 MG: 25 INJECTION INTRAMUSCULAR; INTRAVENOUS at 02:25

## 2022-11-05 RX ADMIN — SODIUM BICARBONATE 1300 MG: 648 TABLET ORAL at 20:42

## 2022-11-05 RX ADMIN — DEXTROSE MONOHYDRATE 125 ML: 100 INJECTION, SOLUTION INTRAVENOUS at 16:43

## 2022-11-05 RX ADMIN — HYDROMORPHONE HYDROCHLORIDE 0.5 MG: 1 INJECTION, SOLUTION INTRAMUSCULAR; INTRAVENOUS; SUBCUTANEOUS at 11:55

## 2022-11-05 RX ADMIN — SODIUM CHLORIDE, PRESERVATIVE FREE 40 MG: 5 INJECTION INTRAVENOUS at 08:41

## 2022-11-05 RX ADMIN — HYDROMORPHONE HYDROCHLORIDE 0.5 MG: 1 INJECTION, SOLUTION INTRAMUSCULAR; INTRAVENOUS; SUBCUTANEOUS at 04:15

## 2022-11-05 RX ADMIN — AMLODIPINE BESYLATE 10 MG: 10 TABLET ORAL at 17:48

## 2022-11-05 RX ADMIN — HYDROMORPHONE HYDROCHLORIDE 0.5 MG: 1 INJECTION, SOLUTION INTRAMUSCULAR; INTRAVENOUS; SUBCUTANEOUS at 08:32

## 2022-11-05 RX ADMIN — DEXTROSE AND SODIUM CHLORIDE: 5; 900 INJECTION, SOLUTION INTRAVENOUS at 06:22

## 2022-11-05 RX ADMIN — INSULIN LISPRO 1 UNITS: 100 INJECTION, SOLUTION INTRAVENOUS; SUBCUTANEOUS at 20:47

## 2022-11-05 RX ADMIN — PROMETHAZINE HYDROCHLORIDE 6.25 MG: 25 INJECTION INTRAMUSCULAR; INTRAVENOUS at 12:43

## 2022-11-05 RX ADMIN — HYDROMORPHONE HYDROCHLORIDE 0.5 MG: 1 INJECTION, SOLUTION INTRAMUSCULAR; INTRAVENOUS; SUBCUTANEOUS at 20:40

## 2022-11-05 RX ADMIN — PROMETHAZINE HYDROCHLORIDE 6.25 MG: 25 INJECTION INTRAMUSCULAR; INTRAVENOUS at 21:17

## 2022-11-05 RX ADMIN — METOCLOPRAMIDE 10 MG: 5 INJECTION, SOLUTION INTRAMUSCULAR; INTRAVENOUS at 04:10

## 2022-11-05 RX ADMIN — DEXTROSE AND SODIUM CHLORIDE: 5; 900 INJECTION, SOLUTION INTRAVENOUS at 16:41

## 2022-11-05 ASSESSMENT — ENCOUNTER SYMPTOMS
ALLERGIC/IMMUNOLOGIC NEGATIVE: 1
APNEA: 0
EYES NEGATIVE: 1
NAUSEA: 0
DIARRHEA: 0
SHORTNESS OF BREATH: 0
ABDOMINAL PAIN: 0
VOMITING: 0
COUGH: 0
CONSTIPATION: 0

## 2022-11-05 ASSESSMENT — PAIN DESCRIPTION - LOCATION
LOCATION: LEG;BACK
LOCATION: BACK;FLANK
LOCATION: BACK

## 2022-11-05 ASSESSMENT — PAIN SCALES - GENERAL
PAINLEVEL_OUTOF10: 8
PAINLEVEL_OUTOF10: 7
PAINLEVEL_OUTOF10: 8
PAINLEVEL_OUTOF10: 0
PAINLEVEL_OUTOF10: 9
PAINLEVEL_OUTOF10: 7

## 2022-11-05 ASSESSMENT — PAIN DESCRIPTION - ORIENTATION
ORIENTATION: RIGHT;LEFT
ORIENTATION: MID
ORIENTATION: RIGHT;LEFT

## 2022-11-05 ASSESSMENT — PAIN DESCRIPTION - DESCRIPTORS: DESCRIPTORS: ACHING;DULL

## 2022-11-05 NOTE — PROGRESS NOTES
Renal Progress Note    Patient :  Dot Bailey; 44 y.o. MRN# [id number]  Location:    Attending:  Milo Lerma MD  Admit Date:  2022   Hospital Day: 4    Subjective:     Patient seen and examined. Patient was sitting comfortably she was alert and awake x3. Patient states now she is able to eat and drink without any difficulty. No acute overnight issues. Stable hemodynamics, no fevers. Bit on hypertensive side. Still some nausea, no vomiting, eating eggs sausage, but only few bites, remains on iv reglan. On insulin ggt, on D5% 0.9%NS at 100cc/hr. Input/Output:       I/O last 3 completed shifts: In: 120 [P.O.:120]  Out: 400 [Urine:400]    Vital Signs:   Temperature:  Temp: 98.2 °F (36.8 °C)  TMax:   Temp (24hrs), Av.1 °F (36.7 °C), Min:97.8 °F (36.6 °C), Max:98.6 °F (37 °C)    Respirations:  Resp: 24  Pulse:   Heart Rate: 88  BP:    BP: (!) 154/89  BP Range: Systolic (10TDU), CLA:705 , Min:141 , MTF:671       Diastolic (41TZA), DMS:30, Min:67, Max:89      Physical Examination:     General:  AAO x 3, speaking in full sentences, no accessory muscle use. HEENT: Atraumatic, normocephalic, no throat congestion, moist mucosa. Eyes:   Pupils equal, round and reactive to light, EOMI. Neck:   Supple  Chest:   Bilateral vesicular breath sounds, no rales or wheezes. Cardiac:  S1 S2 RR, no murmurs, gallops or rubs. Abdomen: Soft, non-tender, no masses or organomegaly, BS audible. :   No suprapubic or flank tenderness. Neuro:  AAO x 3, No FND. SKIN:  No rashes, good skin turgor. Extremities:  No edema.     Labs:       Recent Labs     22  0629 22  0859   WBC 15.0* 9.4   RBC 3.38* 2.81*   HGB 9.3* 7.9*   HCT 29.6* 24.0*   MCV 87.6 85.4   MCH 27.5 28.1   MCHC 31.4 32.9   RDW 13.7 13.9    See Reflexed IPF Result   MPV 10.3  --       BMP:   Recent Labs     224    138 136   K 4.0 4.2 4.2   * 111* 111*   CO2 17* 13* 12*   BUN 23* 22* 21*   CREATININE 2.57* 2.37* 2.31*   GLUCOSE 88 109* 163*   CALCIUM 7.8* 7.5* 7.5*      Phosphorus:     Recent Labs     11/04/22  1530 11/04/22  2314 11/05/22  0254   PHOS 3.6 3.4 3.8     Magnesium:    Recent Labs     11/04/22  1530 11/04/22  2314 11/05/22  0254   MG 2.4 2.3 2.2   SPEP:  Lab Results   Component Value Date/Time    PROT 7.7 11/01/2022 11:26 AM       Urinalysis/Chemistries:      Lab Results   Component Value Date/Time    NITRU NEGATIVE 11/04/2022 02:11 AM    COLORU Yellow 11/04/2022 02:11 AM    PHUR 5.5 11/04/2022 02:11 AM    LABCAST 10-20 Hyaline 07/22/2016 01:30 AM    WBCUA 5 TO 10 11/04/2022 02:11 AM    RBCUA TOO NUMEROUS TO COUNT 11/04/2022 02:11 AM    MUCUS 1+ 11/04/2022 02:11 AM    YEAST Present 07/22/2016 01:30 AM    BACTERIA FEW 11/01/2022 04:10 PM    CLARITYU CLOUDY 07/22/2016 01:30 AM    SPECGRAV 1.012 11/04/2022 02:11 AM    LEUKOCYTESUR NEGATIVE 11/04/2022 02:11 AM    UROBILINOGEN Normal 11/04/2022 02:11 AM    BILIRUBINUR NEGATIVE 11/04/2022 02:11 AM    BLOODU LARGE 07/22/2016 01:30 AM    GLUCOSEU NEGATIVE 11/04/2022 02:11 AM    KETUA SMALL 11/04/2022 02:11 AM     Urine Sodium:     Lab Results   Component Value Date/Time    GAMA <20 11/04/2022 11:37 AM         Lab Results   Component Value Date/Time    LABCREA 94.0 11/04/2022 11:37 AM     UPC:     Urine Eosinophils:  No components found for: UEOS    Radiology:     Reviewed. Renal US 11/4/22  Impression   Unremarkable renal ultrasound. No hydronephrosis. Assessment:     1. Acute Kidney Injury, likely secondary to prerenal 2/2 nausea, vomiting, poor oral intake, diuresis related to hyperglycemia. Cannot rule out ATN related to hypoperfusion related to chronic anemia and low blood pressure. 2.  Chronic kidney disease stage IIIa, likely secondary to diabetic nephrosclerosis, last known proteinuria <1g. Baseline around 1.8.    3.  Anemia of chronic disease  4. Type 1 diabetes mellitus (on insulin pump)  5. Hypertension  6. CT A/P concerning for pyelonephritis or likely renal infarct because of symmetric stranding of fat around the kidneys. 7.  Reflux esophagitis, small sliding hiatal hernia, esophageal nodule on EGD. On PPI  8. On Reglan 10 Mg every 6 hourly for diabetic gastroparesis    Plan:   1. D/c IVF. 2.  Replace electrolytes as appropriate. 3.  Start oral bicarb  4. Check venous blood gas. 5.  Renal ultrasound ruled out hydronephrosis or nephrolithiasis. 6.  Upon d/c she will need f/u in our office as he is now living in the [address] [place] [address]. 7.  Will follow. Will discuss with Dr. Arleth Vásquez. Nutrition   Please ensure that patient is on a renal diet/TF. Avoid nephrotoxic drugs/contrast exposure. GENESIS Recio   [facility]  11/5/2022    Attending Physician Statement  I have discussed the care of [facility], including pertinent history and exam findings with the NP I have reviewed the key elements of all parts of the encounter with the np. Note was updated and recorded changes were made I have seen and examined the patient with the np. I agree with the assessment and plan and status of the problem list as documented. Patient was placed on her own insulin pump. However after discontinuation of IV fluids she dropped her blood sugar. She received D10 and her blood sugars improved  Addiitionally I recommend start D5 with normal saline at 75 mL/h. Slowly taper IV fluid to avoid hypoglycemia  Patient also have very low serum bicarbonate. We will check urine anion gap. We will start her on bicarbonate tablets by mouth  .   Reed Hung MD

## 2022-11-05 NOTE — PROGRESS NOTES
[facility]  Internal Medicine Teaching Residency Program  Inpatient Daily Progress Note  ______________________________________________________________________________    Patient: Yola Fraga  YOB: 1985   TJR:[id number]    Acct: [de-identified]     Room: 2017/2017-01  Admit date: 11/1/2022  Today's date: 11/05/22  Number of days in the hospital: 4    SUBJECTIVE   Admitting Diagnosis: Hematemesis  CC: Bloody emesis, abdominal pain    - Pt examined at bedside. Chart & results reviewed. Patient resting comfortably on bed  Patient denies any nausea, hematemesis and vomiting   Patient states that her appetite is good and is tolerating her diet well   Patient denies chest pain, lightheadedness and headache  Patient saturating well at room air  No acute events overnight    Review of Systems   Constitutional:  Positive for fatigue. Negative for appetite change, chills, diaphoresis and fever. HENT: Negative. Eyes: Negative. Respiratory:  Negative for apnea, cough and shortness of breath. Cardiovascular:  Negative for chest pain, palpitations and leg swelling. Gastrointestinal:  Negative for abdominal pain, constipation, diarrhea, nausea and vomiting. Endocrine: Negative. Genitourinary:  Negative for dysuria, flank pain and urgency. Musculoskeletal: Negative. Skin: Negative. Allergic/Immunologic: Negative. Neurological: Negative. Hematological: Negative. Psychiatric/Behavioral: Negative. BRIEF HISTORY     The patient is a pleasant 44 y.o. female presents with a chief complaint of nausea, hematemesis, and abdominal pain. The patient was recently admitted to the hospital with the same complaints which she had episodes of nonbloody vomiting in the morning following by hematemesis and abdominal pain.   During the previous admission, her hemoglobin was 10.5, creatinine was 1.45 which was baseline, lipase was normal, occult blood was positive. CT abdomen showed pyelonephritis/renal infarct. She was started on IV ceftriaxone. She was also given IV PPI twice daily due to hematemesis likely secondary to Delmis-Suarez tear. GI was consulted who recommended conservative treatment and not EGD with Protonix 40 mg IV twice daily for 8 weeks and change to p.o. when the patient tolerates. After this, the patient left AMA. The patient presented to ED today with the similar complaints with increased intensity of abdominal pain and vomiting frequency. During evaluation, she had hematemesis with coffee-ground component/dark digested blood component in her vomitus. She also complains of abdominal and bilateral flank pain. The patient reports that she was tolerating liquids yesterday but today, she woke up and started throwing up uncontrollably. She also complains of chills. During examination, the patient had diffuse abdominal tenderness. The patient was again started on ceftriaxone IV 1 dose. OBJECTIVE     Vital Signs:  BP (!) 156/84   Pulse 98   Temp 98.3 °F (36.8 °C) (Oral)   Resp 13   Ht 5' 1\" (1.549 m)   Wt 208 lb (94.3 kg)   LMP  (LMP Unknown)   SpO2 99%   BMI 39.30 kg/m²     Temp (24hrs), Av.2 °F (36.8 °C), Min:97.8 °F (36.6 °C), Max:98.6 °F (37 °C)    In: 420   Out: 2000 [Urine:2000]    Physical Exam  Constitutional:       General: She is awake. Cardiovascular:      Heart sounds: Normal heart sounds. Pulmonary:      Effort: Pulmonary effort is normal.      Breath sounds: Normal breath sounds. Chest:      Chest wall: No swelling or tenderness. Abdominal:      Palpations: Abdomen is soft. Tenderness: There is no abdominal tenderness. There is no guarding or rebound. Musculoskeletal:      Right lower leg: No edema. Left lower leg: No edema. Neurological:      Mental Status: She is alert and oriented to person, place, and time.    Psychiatric:         Attention and Perception: Attention normal. Mood and Affect: Mood normal.         Behavior: Behavior is cooperative. Medications:  Scheduled Medications:    [Held by provider] Insulin Pump - Bolus Dose   SubCUTAneous 4x Daily AC & HS    [Held by provider] Insulin Pump - Basal Dose   SubCUTAneous Daily    sucralfate  1 g Oral 4 times per day    amLODIPine  10 mg Oral QPM    sodium chloride flush  5-40 mL IntraVENous 2 times per day    heparin (porcine)  5,000 Units SubCUTAneous 3 times per day    pantoprazole (PROTONIX) 40 mg injection  40 mg IntraVENous BID     Continuous Infusions:    dextrose 5 % and 0.9 % NaCl 100 mL/hr at 11/05/22 0622    insulin 0.002 Units/kg/hr (11/05/22 1011)    dextrose       PRN Medicationsmetoclopramide, 10 mg, Q6H PRN  glucagon (rDNA), 1 mg, PRN  HYDROmorphone, 0.5 mg, Q4H PRN  dextrose bolus, 125 mL, PRN   Or  dextrose bolus, 250 mL, PRN  magnesium sulfate, 1,000 mg, PRN  sodium phosphate IVPB, 10 mmol, PRN   Or  sodium phosphate IVPB, 15 mmol, PRN   Or  sodium phosphate IVPB, 20 mmol, PRN  promethazine, 6.25 mg, Q4H PRN  clonazePAM, 0.5 mg, Q12H PRN  sodium chloride flush, 5-40 mL, PRN  polyethylene glycol, 17 g, Daily PRN  glucose, 4 tablet, PRN  dextrose bolus, 125 mL, PRN   Or  dextrose bolus, 250 mL, PRN  glucagon (rDNA), 1 mg, PRN  dextrose, , Continuous PRN  hydrOXYzine HCl, 10 mg, TID PRN  labetalol, 10 mg, Q6H PRN  dicyclomine, 20 mg, 4x Daily PRN      Diagnostic Labs:  CBC:   Recent Labs     11/03/22  0629 11/04/22  0859   WBC 15.0* 9.4   RBC 3.38* 2.81*   HGB 9.3* 7.9*   HCT 29.6* 24.0*   MCV 87.6 85.4   RDW 13.7 13.9    See Reflexed IPF Result       BMP:   Recent Labs     11/05/22  0254 11/05/22  0706 11/05/22  1147    141 137   K 4.2 4.4 4.0   * 112* 110*   CO2 12* 15* 15*   PHOS 3.8 3.7 2.8   BUN 21* 19 16   CREATININE 2.31* 2.09* 1.84*       BNP: No results for input(s): BNP in the last 72 hours. PT/INR: No results for input(s): PROTIME, INR in the last 72 hours.   APTT: No results for input(s): APTT in the last 72 hours. CARDIAC ENZYMES: No results for input(s): CKMB, CKMBINDEX, TROPONINI in the last 72 hours. Invalid input(s): CKTOTAL;3  FASTING LIPID PANEL:No results found for: CHOL, HDL, TRIG  LIVER PROFILE: No results for input(s): AST, ALT, ALB, BILIDIR, BILITOT, ALKPHOS in the last 72 hours. MICROBIOLOGY:   Lab Results   Component Value Date/Time    CULTURE NO SIGNIFICANT GROWTH 11/01/2022 04:23 PM       Imaging:    CT ABDOMEN PELVIS WO CONTRAST Additional Contrast? None    Result Date: 10/30/2022  Symmetric stranding of fat around the kidneys. Correlate for clinical signs of pyelonephritis or renal infarct. Trace pelvic free fluid. XR ABDOMEN (KUB) (SINGLE AP VIEW)    Result Date: 11/3/2022  No acute process     XR ABDOMEN (KUB) (SINGLE AP VIEW)    Result Date: 11/1/2022  No acute abdominal abnormality by radiograph. US RENAL COMPLETE    Result Date: 11/4/2022  Unremarkable renal ultrasound. No hydronephrosis. US GALLBLADDER RUQ    Result Date: 11/2/2022  Unremarkable right upper quadrant ultrasound. XR CHEST PORTABLE    Result Date: 10/30/2022  No evidence of acute pulmonary abnormality seen. No radiographic evidence of intra-free air is seen beneath the diaphragm. ASSESSMENT & PLAN     ASSESSMENT / PLAN:     Ketoacidosis due to diabetes ([address]  Plan:    VBG reveals metabolic acidosis with pH 7.23, pCO2 35, HCO3 13. AG 15  POC glucose 117  Patient feeling better, able to tolerate diet  Document strictly input and output  Patient is on bridging now.   Contrast dextrose 5% and normal saline    JORGE (acute kidney injury) ([address]  Plan:   Creatinine 1.84, BUN 16  Nephrology consulted, appreciate recommendations  Follow-up with protein creatinine ratio and urine - 1.31  Follow-up with sodium urine- <20  Urine creatinine 111  Unremarkable renal ultrasound, no hydronephrosis  FeNa suggests pre- Renal JORGE    Hematemesis  Plan:   Hematemesis resolved, but patient still having 1-2 episode of vomiting, no blood in it  GI following, appreciate recommendations  GI performed EGD which showed  Reflux esophagitis and lower esophagus, esophageal nodule at GE junction, biopsy was done follow-up with biopsy. Stomach showed small sliding hiatal hernia otherwise stomach was normal  Duodenum was also normal  Per GI, continue PPI twice daily   Repeat upper endoscopy in 4 to 6 weeks to check healing of esophageal nodule  Reglan injection 10 Mg every 6 hours  X-ray abdomen negative for perforation  Unremarkable right upper quadrant ultrasound  Promethazine as needed  GI signed off  Patient stable    Pyelonephritis  Plan:    WBC is 15.0 ->9.4  UA suggestive of UTI  Follow-up with urine culture-no significant growth  Abdominal CT done on last admission was suggestive of pyelonephritis or infarct. Diet: Adult regular  DVT ppx : Heparin subcu 5000 units 3 times daily  GI ppx: Protonix 40 Mg IV twice daily    Discharge Planning:  following, disposition Sanket Soares M.D. Internal Medicine Resident PGY-1  [facility],  [facility].    12:22 PM 11/5/2022   Attending Physician Statement  I have discussed the care of [facility], including pertinent history and exam findings,  with the resident. I have seen and examined the patient and the key elements of all parts of the encounter have been performed by me. I agree with the assessment, plan and orders as documented by the resident with additions . Treatment plan Discussed with nursing staff in detail , all questions answered . Electronically signed by Samson Vuong MD on   11/5/22 at 5:34 PM EDT  Cc30 minutes  Please note that this chart was generated using voice recognition Dragon dictation software. Although every effort was made to ensure the accuracy of this automated transcription, some errors in transcription may have occurred.      Please note that part of this chart was generated using voice recognition dictation software. Although every effort was made to ensure the accuracy of this automated transcription, some errors in transcription may have occurred.

## 2022-11-05 NOTE — FLOWSHEET NOTE
1530,  Called to pt room, sugar is now 40  Pt suspended her basal rate of her pump and offered apple juice,  Pt alert and not symptomatic, begins drinking apple juice  1611 update, pt states she is feeling better and her insulin pump remains suspended, more juice offered, pt declines peanut butter declines OJ  1633 physician updated and  iv d10 hung , pump remains suspended

## 2022-11-06 VITALS
DIASTOLIC BLOOD PRESSURE: 104 MMHG | HEIGHT: 61 IN | TEMPERATURE: 97.5 F | WEIGHT: 208 LBS | SYSTOLIC BLOOD PRESSURE: 170 MMHG | RESPIRATION RATE: 17 BRPM | BODY MASS INDEX: 39.27 KG/M2 | OXYGEN SATURATION: 96 % | HEART RATE: 107 BPM

## 2022-11-06 PROBLEM — K27.4 GASTROINTESTINAL HEMORRHAGE ASSOCIATED WITH PEPTIC ULCER: Status: ACTIVE | Noted: 2022-11-06

## 2022-11-06 LAB
ANION GAP SERPL CALCULATED.3IONS-SCNC: 14 MMOL/L (ref 9–17)
BUN BLDV-MCNC: 14 MG/DL (ref 6–20)
CALCIUM SERPL-MCNC: 7.7 MG/DL (ref 8.6–10.4)
CHLORIDE BLD-SCNC: 102 MMOL/L (ref 98–107)
CO2: 15 MMOL/L (ref 20–31)
CREAT SERPL-MCNC: 1.7 MG/DL (ref 0.5–0.9)
GFR SERPL CREATININE-BSD FRML MDRD: 39 ML/MIN/1.73M2
GLUCOSE BLD-MCNC: 191 MG/DL (ref 65–105)
GLUCOSE BLD-MCNC: 207 MG/DL (ref 65–105)
GLUCOSE BLD-MCNC: 319 MG/DL (ref 65–105)
GLUCOSE BLD-MCNC: 382 MG/DL (ref 65–105)
GLUCOSE BLD-MCNC: 392 MG/DL (ref 70–99)
GLUCOSE BLD-MCNC: 432 MG/DL (ref 65–105)
GLUCOSE BLD-MCNC: 76 MG/DL (ref 65–105)
GLUCOSE BLD-MCNC: 91 MG/DL (ref 65–105)
MAGNESIUM: 1.6 MG/DL (ref 1.6–2.6)
PHOSPHORUS: 2.4 MG/DL (ref 2.6–4.5)
POTASSIUM SERPL-SCNC: 3.8 MMOL/L (ref 3.7–5.3)
SODIUM BLD-SCNC: 131 MMOL/L (ref 135–144)

## 2022-11-06 PROCEDURE — 6360000002 HC RX W HCPCS

## 2022-11-06 PROCEDURE — 2580000003 HC RX 258: Performed by: INTERNAL MEDICINE

## 2022-11-06 PROCEDURE — 6370000000 HC RX 637 (ALT 250 FOR IP): Performed by: NURSE PRACTITIONER

## 2022-11-06 PROCEDURE — 82947 ASSAY GLUCOSE BLOOD QUANT: CPT

## 2022-11-06 PROCEDURE — 6370000000 HC RX 637 (ALT 250 FOR IP)

## 2022-11-06 PROCEDURE — 6360000002 HC RX W HCPCS: Performed by: INTERNAL MEDICINE

## 2022-11-06 PROCEDURE — 83735 ASSAY OF MAGNESIUM: CPT

## 2022-11-06 PROCEDURE — 99238 HOSP IP/OBS DSCHRG MGMT 30/<: CPT | Performed by: INTERNAL MEDICINE

## 2022-11-06 PROCEDURE — 6370000000 HC RX 637 (ALT 250 FOR IP): Performed by: INTERNAL MEDICINE

## 2022-11-06 PROCEDURE — 80048 BASIC METABOLIC PNL TOTAL CA: CPT

## 2022-11-06 PROCEDURE — 36415 COLL VENOUS BLD VENIPUNCTURE: CPT

## 2022-11-06 PROCEDURE — 84100 ASSAY OF PHOSPHORUS: CPT

## 2022-11-06 PROCEDURE — C9113 INJ PANTOPRAZOLE SODIUM, VIA: HCPCS | Performed by: INTERNAL MEDICINE

## 2022-11-06 RX ORDER — METOCLOPRAMIDE HYDROCHLORIDE 5 MG/ML
5 INJECTION INTRAMUSCULAR; INTRAVENOUS EVERY 6 HOURS PRN
Status: DISCONTINUED | OUTPATIENT
Start: 2022-11-06 | End: 2022-11-06 | Stop reason: HOSPADM

## 2022-11-06 RX ORDER — DIPHENHYDRAMINE HYDROCHLORIDE 50 MG/ML
10 INJECTION INTRAMUSCULAR; INTRAVENOUS ONCE
Status: COMPLETED | OUTPATIENT
Start: 2022-11-06 | End: 2022-11-06

## 2022-11-06 RX ORDER — CLONAZEPAM 0.5 MG/1
0.5 TABLET, ORALLY DISINTEGRATING ORAL EVERY 12 HOURS PRN
Qty: 5 TABLET | Refills: 0 | Status: SHIPPED | OUTPATIENT
Start: 2022-11-06 | End: 2022-11-11

## 2022-11-06 RX ORDER — INSULIN LISPRO 100 [IU]/ML
5 INJECTION, SOLUTION INTRAVENOUS; SUBCUTANEOUS ONCE
Status: COMPLETED | OUTPATIENT
Start: 2022-11-06 | End: 2022-11-06

## 2022-11-06 RX ORDER — INSULIN LISPRO 100 [IU]/ML
2 INJECTION, SOLUTION INTRAVENOUS; SUBCUTANEOUS ONCE
Status: COMPLETED | OUTPATIENT
Start: 2022-11-06 | End: 2022-11-06

## 2022-11-06 RX ORDER — OMEPRAZOLE 40 MG/1
40 CAPSULE, DELAYED RELEASE ORAL
Qty: 30 CAPSULE | Refills: 1 | Status: SHIPPED | OUTPATIENT
Start: 2022-11-06 | End: 2022-12-06

## 2022-11-06 RX ORDER — SODIUM BICARBONATE 650 MG/1
1300 TABLET ORAL 2 TIMES DAILY
Qty: 40 TABLET | Refills: 0 | Status: SHIPPED | OUTPATIENT
Start: 2022-11-06 | End: 2022-11-16

## 2022-11-06 RX ORDER — DIPHENHYDRAMINE HCL 25 MG
25 TABLET ORAL ONCE
Status: COMPLETED | OUTPATIENT
Start: 2022-11-06 | End: 2022-11-06

## 2022-11-06 RX ADMIN — INSULIN LISPRO 2 UNITS: 100 INJECTION, SOLUTION INTRAVENOUS; SUBCUTANEOUS at 05:09

## 2022-11-06 RX ADMIN — SODIUM CHLORIDE, PRESERVATIVE FREE 10 ML: 5 INJECTION INTRAVENOUS at 09:14

## 2022-11-06 RX ADMIN — SODIUM CHLORIDE, PRESERVATIVE FREE 10 ML: 5 INJECTION INTRAVENOUS at 07:58

## 2022-11-06 RX ADMIN — SUCRALFATE ORAL 1 G: 1 SUSPENSION ORAL at 12:42

## 2022-11-06 RX ADMIN — INSULIN LISPRO 5 UNITS: 100 INJECTION, SOLUTION INTRAVENOUS; SUBCUTANEOUS at 06:12

## 2022-11-06 RX ADMIN — SODIUM CHLORIDE, PRESERVATIVE FREE 10 ML: 5 INJECTION INTRAVENOUS at 12:47

## 2022-11-06 RX ADMIN — PROMETHAZINE HYDROCHLORIDE 6.25 MG: 25 INJECTION INTRAMUSCULAR; INTRAVENOUS at 04:49

## 2022-11-06 RX ADMIN — SODIUM CHLORIDE, PRESERVATIVE FREE 10 ML: 5 INJECTION INTRAVENOUS at 08:00

## 2022-11-06 RX ADMIN — HYDROMORPHONE HYDROCHLORIDE 0.5 MG: 1 INJECTION, SOLUTION INTRAMUSCULAR; INTRAVENOUS; SUBCUTANEOUS at 12:47

## 2022-11-06 RX ADMIN — SODIUM CHLORIDE, PRESERVATIVE FREE 40 MG: 5 INJECTION INTRAVENOUS at 07:58

## 2022-11-06 RX ADMIN — HYDROMORPHONE HYDROCHLORIDE 0.5 MG: 1 INJECTION, SOLUTION INTRAMUSCULAR; INTRAVENOUS; SUBCUTANEOUS at 00:53

## 2022-11-06 RX ADMIN — HYDROMORPHONE HYDROCHLORIDE 0.5 MG: 1 INJECTION, SOLUTION INTRAMUSCULAR; INTRAVENOUS; SUBCUTANEOUS at 04:50

## 2022-11-06 RX ADMIN — SODIUM BICARBONATE 1300 MG: 648 TABLET ORAL at 07:58

## 2022-11-06 RX ADMIN — DIPHENHYDRAMINE HCL 25 MG: 25 TABLET ORAL at 08:26

## 2022-11-06 RX ADMIN — DIPHENHYDRAMINE HYDROCHLORIDE 10 MG: 50 INJECTION, SOLUTION INTRAMUSCULAR; INTRAVENOUS at 01:42

## 2022-11-06 RX ADMIN — HYDROMORPHONE HYDROCHLORIDE 0.5 MG: 1 INJECTION, SOLUTION INTRAMUSCULAR; INTRAVENOUS; SUBCUTANEOUS at 09:13

## 2022-11-06 RX ADMIN — METOCLOPRAMIDE 10 MG: 5 INJECTION, SOLUTION INTRAMUSCULAR; INTRAVENOUS at 06:16

## 2022-11-06 ASSESSMENT — ENCOUNTER SYMPTOMS
APNEA: 0
CONSTIPATION: 0
COUGH: 0
DIARRHEA: 0
SHORTNESS OF BREATH: 0
ABDOMINAL PAIN: 0
ALLERGIC/IMMUNOLOGIC NEGATIVE: 1
EYES NEGATIVE: 1
NAUSEA: 0
VOMITING: 0

## 2022-11-06 ASSESSMENT — PAIN DESCRIPTION - ORIENTATION
ORIENTATION: RIGHT;LEFT
ORIENTATION: RIGHT;LEFT;MID
ORIENTATION: RIGHT;LEFT
ORIENTATION: LEFT;RIGHT;MID
ORIENTATION: MID;LEFT;RIGHT

## 2022-11-06 ASSESSMENT — PAIN SCALES - GENERAL
PAINLEVEL_OUTOF10: 7
PAINLEVEL_OUTOF10: 6
PAINLEVEL_OUTOF10: 7

## 2022-11-06 ASSESSMENT — PAIN DESCRIPTION - LOCATION
LOCATION: BACK;FLANK;LEG
LOCATION: LEG;FLANK
LOCATION: ABDOMEN;FLANK
LOCATION: FLANK
LOCATION: FLANK

## 2022-11-06 ASSESSMENT — PAIN - FUNCTIONAL ASSESSMENT
PAIN_FUNCTIONAL_ASSESSMENT: ACTIVITIES ARE NOT PREVENTED
PAIN_FUNCTIONAL_ASSESSMENT: ACTIVITIES ARE NOT PREVENTED

## 2022-11-06 ASSESSMENT — PAIN DESCRIPTION - DESCRIPTORS
DESCRIPTORS: ACHING;DISCOMFORT;HEAVINESS
DESCRIPTORS: ACHING;SORE;TENDER
DESCRIPTORS: ACHING
DESCRIPTORS: ACHING

## 2022-11-06 NOTE — PROGRESS NOTES
POC Blood Glucose level 382 @ 0445. On call resident made aware, okay to give 2 units of lispro (patient had severe episode of hypoglycemia previous shift with known events of hypoglycemic episodes). Sugar rechecked @ 0530, 432. Patient diaphoretic/pale. New orders to provide 5 units of regular insulin. Spoken with Dr Maryann Mc via phone call expressing concerns regarding high sugar/restarting insulin gtt. Due to hypoglycemic events in the past, will provide 5 units of Lispro and recheck blood sugar in one hour.

## 2022-11-06 NOTE — CARE COORDINATION
Met with patient to discuss transitional planning. She is returning home independently.  She denies needs and has transportation    Discharge [facility] Case Management Department  Written by: Ammy Sosa RN    Patient Name: Georgina Robbins  Attending Provider: No att. providers found  Admit Date: 2022 10:50 AM  MRN: [id number]  Account: [de-identified]                     : 1985  Discharge Date: 2022      Disposition: home    Ammy Sosa RN

## 2022-11-06 NOTE — FLOWSHEET NOTE
Patient discharged to home, accompanied by family with all belongings. Discharge instructions given, patient verbalized understanding. Patient left unit ambulatory.

## 2022-11-06 NOTE — PROGRESS NOTES
Renal Progress Note    Patient :  Beatriz Dela Cruz; 44 y.o. MRN# [id number]  Location:    Attending:  Li Gill MD  Admit Date:  2022   Hospital Day: 5    Subjective:     Patient seen and examined. Patient was sitting comfortably she was alert and awake x3. Patient states now she is able to eat and drink without any difficulty. No acute overnight issues. Stable hemodynamics, no fevers. Bit on hypertensive side. Blood sugars in 300's  Off IVF  Started on oral bicarb tabs yesterday. Urine Chloride 5, Urine potassium 7.6 urine sodium 48  Cr about the same past 24 hours. K 3.8  CO2 15    Venous blood caroline pH 7.237 pCO2 35.9 CO3 14.7    No CP/SOB, tolerating PO intake better. Input/Output:       I/O last 3 completed shifts: In: 5715 [P.O.:500; I.V.:1350]  Out: 3100 [Urine:3100]    Vital Signs:   Temperature:  Temp: 98.2 °F (36.8 °C)  TMax:   Temp (24hrs), Av.9 °F (36.6 °C), Min:97.5 °F (36.4 °C), Max:98.2 °F (36.8 °C)    Respirations:  Resp: 17  Pulse:   Heart Rate: (!) 101  BP:    BP: (!) 159/87  BP Range: Systolic (42LEB), FYT:581 , Min:151 , RJR:129       Diastolic (92HRV), PDM:13, Min:87, Max:115      Physical Examination:     General:  AAO x 3, speaking in full sentences, no accessory muscle use. HEENT: Atraumatic, normocephalic, no throat congestion, moist mucosa. Eyes:   Pupils equal, round and reactive to light, EOMI. Neck:   Supple  Chest:   Bilateral vesicular breath sounds, no rales or wheezes. Cardiac:  S1 S2 RR, no murmurs, gallops or rubs. Abdomen: Soft, non-tender, no masses or organomegaly, BS audible. :   No suprapubic or flank tenderness. Neuro:  AAO x 3, No FND. SKIN:  No rashes, good skin turgor. Extremities:  No edema.     Labs:       Recent Labs     22  0859   WBC 9.4   RBC 2.81*   HGB 7.9*   HCT 24.0*   MCV 85.4   MCH 28.1   MCHC 32.9   RDW 13.9   PLT See Reflexed IPF Result      BMP:   Recent Labs     22  1147 228 22  3297  133* 131*   K 4.0 4.0 3.8   * 105 102   CO2 15* 18* 15*   BUN 16 13 14   CREATININE 1.84* 1.74* 1.70*   GLUCOSE 117* 267* 392*   CALCIUM 7.8* 7.5* 7.7*      Phosphorus:     Recent Labs     11/05/22  0706 11/05/22  1147 11/06/22  0642   PHOS 3.7 2.8 2.4*     Magnesium:    Recent Labs     11/05/22  0706 11/05/22  1147 11/06/22  0642   MG 2.1 1.8 1.6   SPEP:  Lab Results   Component Value Date/Time    PROT 7.7 11/01/2022 11:26 AM       Urinalysis/Chemistries:      Lab Results   Component Value Date/Time    NITRU NEGATIVE 11/04/2022 02:11 AM    COLORU Yellow 11/04/2022 02:11 AM    PHUR 5.5 11/04/2022 02:11 AM    LABCAST 10-20 Hyaline 07/22/2016 01:30 AM    WBCUA 5 TO 10 11/04/2022 02:11 AM    RBCUA TOO NUMEROUS TO COUNT 11/04/2022 02:11 AM    MUCUS 1+ 11/04/2022 02:11 AM    YEAST Present 07/22/2016 01:30 AM    BACTERIA FEW 11/01/2022 04:10 PM    CLARITYU CLOUDY 07/22/2016 01:30 AM    SPECGRAV 1.012 11/04/2022 02:11 AM    LEUKOCYTESUR NEGATIVE 11/04/2022 02:11 AM    UROBILINOGEN Normal 11/04/2022 02:11 AM    BILIRUBINUR NEGATIVE 11/04/2022 02:11 AM    BLOODU LARGE 07/22/2016 01:30 AM    GLUCOSEU NEGATIVE 11/04/2022 02:11 AM    KETUA SMALL 11/04/2022 02:11 AM     Urine Sodium:     Lab Results   Component Value Date/Time    GAMA 48 11/05/2022 09:40 PM         Lab Results   Component Value Date/Time    LABCREA 94.0 11/04/2022 11:37 AM     UPC:     Urine Eosinophils:  No components found for: UEOS    Radiology:     Reviewed. Renal US 11/4/22  Impression   Unremarkable renal ultrasound. No hydronephrosis. Assessment:     1. Acute Kidney Injury, likely secondary to prerenal 2/2 nausea, vomiting, poor oral intake, diuresis related to hyperglycemia. Cannot rule out ATN related to hypoperfusion related to chronic anemia and low blood pressure. Cr at baseline. 2.  Chronic kidney disease stage IIIa, likely secondary to diabetic nephrosclerosis, last known proteinuria <1g.   Baseline around 1.8.    3.  Anemia of chronic disease  4. Type 1 diabetes mellitus (on insulin pump)  5. Hypertension  6. CT A/P concerning for pyelonephritis or likely renal infarct because of symmetric stranding of fat around the kidneys. 7.  Reflux esophagitis, small sliding hiatal hernia, esophageal nodule on EGD. On PPI  8. On Reglan 10 Mg every 6 hourly for diabetic gastroparesis    Plan:   Continue on oral bicarb  2. Upon d/c she will need f/u in our office as he is now living in the [facility], f/u with Dr. Richy Anne in 2-3 weeks,   3. No objection to discharge from a Nephrology standpoint. Will discuss with Dr. Lyn Barillas. Nutrition   Please ensure that patient is on a renal diet/TF. Avoid nephrotoxic drugs/contrast exposure. GENESIS Berger   [facility]  11/5/2022    Patient was discharged before my rounds.   Davide Dubois MD

## 2022-11-06 NOTE — DISCHARGE INSTRUCTIONS
You were seen and admitted for an infection around your kidneys, acid buildup in your blood due to the nausea and vomiting. Please take your medications as prescribed  Please follow-up with endocrinologist for management of her insulin pump. Please follow-up with kidney doctors  Please follow-up with your PCP.   Please follow with the stomach doctors  Please return if you have any worsening of your symptoms

## 2022-11-06 NOTE — PROGRESS NOTES
[facility]  Internal Medicine Teaching Residency Program  Inpatient Daily Progress Note  ______________________________________________________________________________    Patient: Jean Jimenez  YOB: 1985   HYX:[id number]    Acct: [de-identified]     Room: 2017/2017-01  Admit date: 11/1/2022  Today's date: 11/06/22  Number of days in the hospital: 5    SUBJECTIVE   Admitting Diagnosis: Hematemesis  CC: Bloody emesis, abdominal pain    - Pt examined at bedside. Chart & results reviewed. Patient resting comfortably on bed  Patient denies any nausea, hematemesis and vomiting   Patient states that her appetite is good and is tolerating her diet well   Patient denies chest pain, lightheadedness and headache  Patient saturating well at room air  No acute events overnight  Patient is having labile blood sugar levels, but patient denies any symptom  Patient denies any fever and chills    Review of Systems   Constitutional:  Negative for appetite change, chills, diaphoresis, fatigue and fever. HENT: Negative. Eyes: Negative. Respiratory:  Negative for apnea, cough and shortness of breath. Cardiovascular:  Negative for chest pain, palpitations and leg swelling. Gastrointestinal:  Negative for abdominal pain, constipation, diarrhea, nausea and vomiting. Endocrine: Negative. Genitourinary:  Negative for dysuria, flank pain and urgency. Musculoskeletal: Negative. Skin: Negative. Allergic/Immunologic: Negative. Neurological: Negative. Hematological: Negative. Psychiatric/Behavioral: Negative. BRIEF HISTORY     The patient is a pleasant 44 y.o. female presents with a chief complaint of nausea, hematemesis, and abdominal pain. The patient was recently admitted to the hospital with the same complaints which she had episodes of nonbloody vomiting in the morning following by hematemesis and abdominal pain.   During the previous admission, her hemoglobin was 10.5, creatinine was 1.45 which was baseline, lipase was normal, occult blood was positive. CT abdomen showed pyelonephritis/renal infarct. She was started on IV ceftriaxone. She was also given IV PPI twice daily due to hematemesis likely secondary to Delmis-Suarez tear. GI was consulted who recommended conservative treatment and not EGD with Protonix 40 mg IV twice daily for 8 weeks and change to p.o. when the patient tolerates. After this, the patient left AMA. The patient presented to ED today with the similar complaints with increased intensity of abdominal pain and vomiting frequency. During evaluation, she had hematemesis with coffee-ground component/dark digested blood component in her vomitus. She also complains of abdominal and bilateral flank pain. The patient reports that she was tolerating liquids yesterday but today, she woke up and started throwing up uncontrollably. She also complains of chills. During examination, the patient had diffuse abdominal tenderness. The patient was again started on ceftriaxone IV 1 dose. OBJECTIVE     Vital Signs:  BP (!) 158/95   Pulse 100   Temp 98.1 °F (36.7 °C) (Oral)   Resp 15   Ht 5' 1\" (1.549 m)   Wt 208 lb (94.3 kg)   LMP  (LMP Unknown)   SpO2 98%   BMI 39.30 kg/m²     Temp (24hrs), Av.9 °F (36.6 °C), Min:97.5 °F (36.4 °C), Max:98.3 °F (36.8 °C)    In: 1250   Out: 2500 [Urine:2500]    Physical Exam  Constitutional:       General: She is awake. Cardiovascular:      Heart sounds: Normal heart sounds. Pulmonary:      Effort: Pulmonary effort is normal.      Breath sounds: Normal breath sounds. Chest:      Chest wall: No swelling or tenderness. Abdominal:      Palpations: Abdomen is soft. Tenderness: There is no abdominal tenderness. There is no guarding or rebound. Musculoskeletal:      Right lower leg: No edema. Left lower leg: No edema.    Neurological:      Mental Status: She is alert and oriented to person, place, and time. Psychiatric:         Attention and Perception: Attention normal.         Mood and Affect: Mood normal.         Behavior: Behavior is cooperative.      Medications:  Scheduled Medications:    diphenhydrAMINE  10 mg IntraVENous Once    sodium bicarbonate  1,300 mg Oral BID    insulin lispro  0-8 Units SubCUTAneous TID WC    insulin lispro  0-4 Units SubCUTAneous Nightly    [Held by provider] Insulin Pump - Bolus Dose   SubCUTAneous 4x Daily AC & HS    [Held by provider] Insulin Pump - Basal Dose   SubCUTAneous Daily    sucralfate  1 g Oral 4 times per day    amLODIPine  10 mg Oral QPM    sodium chloride flush  5-40 mL IntraVENous 2 times per day    heparin (porcine)  5,000 Units SubCUTAneous 3 times per day    pantoprazole (PROTONIX) 40 mg injection  40 mg IntraVENous BID     Continuous Infusions:    dextrose 5 % and 0.9 % NaCl 50 mL/hr at 11/05/22 2332    dextrose       PRN Medicationsmetoclopramide, 10 mg, Q6H PRN  glucagon (rDNA), 1 mg, PRN  HYDROmorphone, 0.5 mg, Q4H PRN  dextrose bolus, 125 mL, PRN   Or  dextrose bolus, 250 mL, PRN  magnesium sulfate, 1,000 mg, PRN  sodium phosphate IVPB, 10 mmol, PRN   Or  sodium phosphate IVPB, 15 mmol, PRN   Or  sodium phosphate IVPB, 20 mmol, PRN  promethazine, 6.25 mg, Q4H PRN  clonazePAM, 0.5 mg, Q12H PRN  sodium chloride flush, 5-40 mL, PRN  polyethylene glycol, 17 g, Daily PRN  glucose, 4 tablet, PRN  dextrose bolus, 125 mL, PRN   Or  dextrose bolus, 250 mL, PRN  glucagon (rDNA), 1 mg, PRN  dextrose, , Continuous PRN  hydrOXYzine HCl, 10 mg, TID PRN  labetalol, 10 mg, Q6H PRN  dicyclomine, 20 mg, 4x Daily PRN      Diagnostic Labs:  CBC:   Recent Labs     11/03/22  0629 11/04/22  0859   WBC 15.0* 9.4   RBC 3.38* 2.81*   HGB 9.3* 7.9*   HCT 29.6* 24.0*   MCV 87.6 85.4   RDW 13.7 13.9    See Reflexed IPF Result       BMP:   Recent Labs     11/05/22  0254 11/05/22  0706 11/05/22  1147 11/05/22 2048    141 137 133*   K 4.2 4.4 4.0 4.0   * 112* 110* 105   CO2 12* 15* 15* 18*   PHOS 3.8 3.7 2.8  --    BUN 21* 19 16 13   CREATININE 2.31* 2.09* 1.84* 1.74*       BNP: No results for input(s): BNP in the last 72 hours. PT/INR: No results for input(s): PROTIME, INR in the last 72 hours. APTT: No results for input(s): APTT in the last 72 hours. CARDIAC ENZYMES: No results for input(s): CKMB, CKMBINDEX, TROPONINI in the last 72 hours. Invalid input(s): CKTOTAL;3  FASTING LIPID PANEL:No results found for: CHOL, HDL, TRIG  LIVER PROFILE: No results for input(s): AST, ALT, ALB, BILIDIR, BILITOT, ALKPHOS in the last 72 hours. MICROBIOLOGY:   Lab Results   Component Value Date/Time    CULTURE NO SIGNIFICANT GROWTH 11/01/2022 04:23 PM       Imaging:    CT ABDOMEN PELVIS WO CONTRAST Additional Contrast? None    Result Date: 10/30/2022  Symmetric stranding of fat around the kidneys. Correlate for clinical signs of pyelonephritis or renal infarct. Trace pelvic free fluid. XR ABDOMEN (KUB) (SINGLE AP VIEW)    Result Date: 11/3/2022  No acute process     XR ABDOMEN (KUB) (SINGLE AP VIEW)    Result Date: 11/1/2022  No acute abdominal abnormality by radiograph. US RENAL COMPLETE    Result Date: 11/4/2022  Unremarkable renal ultrasound. No hydronephrosis. US GALLBLADDER RUQ    Result Date: 11/2/2022  Unremarkable right upper quadrant ultrasound. XR CHEST PORTABLE    Result Date: 10/30/2022  No evidence of acute pulmonary abnormality seen. No radiographic evidence of intra-free air is seen beneath the diaphragm. ASSESSMENT & PLAN     ASSESSMENT / PLAN:     Ketoacidosis due to diabetes ([address]  Plan:   POC glucose 207  Patient feeling better, able to tolerate diet  Document strictly input and output  Patient is on bridging now.       JORGE (acute kidney injury) ([facility] [address]  Plan:   Creatinine 1.70, BUN 14  Nephrology consulted, appreciate recommendations  Follow-up with protein creatinine ratio and urine - 1.31  Follow-up with sodium urine- <20  Urine creatinine 111  Unremarkable renal ultrasound, no hydronephrosis  FeNa suggests pre- Renal JORGE  As per nephrology, continue on oral bicarb    Hematemesis  Plan:   Hematemesis resolved, but patient still having 1-2 episode of vomiting, no blood in it  GI following, appreciate recommendations  GI performed EGD which showed  Reflux esophagitis and lower esophagus, esophageal nodule at GE junction, biopsy was done follow-up with biopsy. Stomach showed small sliding hiatal hernia otherwise stomach was normal  Duodenum was also normal  Per GI, continue PPI twice daily   Repeat upper endoscopy in 4 to 6 weeks to check healing of esophageal nodule  Reglan injection 10 Mg every 6 hours  X-ray abdomen negative for perforation  Unremarkable right upper quadrant ultrasound  Promethazine as needed  GI signed off  Patient stable    Pyelonephritis  Plan:    WBC is 15.0 ->9.4  UA suggestive of UTI  Follow-up with urine culture-no significant growth  Abdominal CT done on last admission was suggestive of pyelonephritis or infarct. Diet: Adult regular  DVT ppx : Heparin subcu 5000 units 3 times daily  GI ppx: Protonix 40 Mg IV twice daily    Discharge Planning:  following, disposition Jeyson Birmingham M.D. Internal Medicine Resident PGY-1  [facility], [address] [place] AM 11/6/2022   Attending Physician Statement  I have discussed the care of [facility], including pertinent history and exam findings,  with the resident. I have seen and examined the patient and the key elements of all parts of the encounter have been performed by me. I agree with the assessment, plan and orders as documented by the resident with additions . Treatment plan Discussed with nursing staff in detail , all questions answered .    Electronically signed by Radha Ca MD on   11/6/22 at 6:39 PM EST    Please note that this chart was generated using voice recognition Dragon dictation software. Although every effort was made to ensure the accuracy of this automated transcription, some errors in transcription may have occurred.

## 2022-11-06 NOTE — DISCHARGE INSTR - COC
Continuity of Care Form    Patient Name: Wilner Parson   :  1985  MRN:  [id number]    Admit date:  2022  Discharge date:  ***    Code Status Order: Full Code   Advance Directives:     Admitting Physician:  Adrienne Amaya MD  PCP: Robe Galindo MD    Discharging Nurse: [facility] Unit/Room#:   Discharging Unit Phone Number: ***    Emergency Contact:   Extended Emergency Contact Information  Primary Emergency Contact: Lee Zaragoza Phone: [phone]  Relation: Boyfriend    Past Surgical History:  Past Surgical History:   Procedure Laterality Date    TUBAL LIGATION  2018    UPPER GASTROINTESTINAL ENDOSCOPY N/A 2022    EGD BIOPSY performed by Keven Naqvi MD at [facility]       Immunization History:   Immunization History   Administered Date(s) Administered    COVID-19, PFIZER PURPLE top, DILUTE for use, (age 15 y+), 30mcg/0.3mL 03/10/2021, 2021, 10/21/2021       Active Problems:  Patient Active Problem List   Diagnosis Code    Hematemesis K92.0    Type 1 diabetes mellitus with stage 3a chronic kidney disease ([address] E10.22, N18.31    Pyelonephritis N12    Normocytic anemia D64.9    JORGE (acute kidney injury) ([address] N17.9    Insulin pump in place Z96.41    Proteinuria due to type 1 diabetes mellitus ([address] E10.29, R80.9    Anxiety F41.9    Anemia of chronic disease D63.8    Ketoacidosis due to diabetes ([address] E11.10    Stage 3b chronic kidney disease ([facility] [address] N18.32    Low serum bicarbonate R79.89       Isolation/Infection:   Isolation            No Isolation          Patient Infection Status       None to display            Nurse Assessment:  Last Vital Signs: BP (!) 170/104   Pulse (!) 107   Temp 97.5 °F (36.4 °C) (Oral)   Resp 17   Ht 5' 1\" (1.549 m)   Wt 208 lb (94.3 kg)   LMP  (LMP Unknown)   SpO2 96%   BMI 39.30 kg/m²     Last documented pain score (0-10 scale): Pain Level: 6  Last Weight:   Wt Readings from Last 1 Encounters:   22 208 lb (94.3 kg)     Mental Status:  {IP PT MENTAL STATUS:}    IV Access:  { BIBI IV ACCESS:818677124}    Nursing Mobility/ADLs:  Walking   {CHP DME VRXD:823633250}  Transfer  {CHP DME OHIC:354793813}  Bathing  {CHP DME XRLR:014131199}  Dressing  {CHP DME WBZS:899506493}  Toileting  {CHP DME COGQ:402859857}  Feeding  {CHP DME MLCI:409953956}  Med Admin  {CHP DME FVSQ:961602382}  Med Delivery   { BIBI MED Delivery:795245365}    Wound Care Documentation and Therapy:        Elimination:  Continence: Bowel: {YES / YZ:60495}  Bladder: {YES / IO:75377}  Urinary Catheter: {Urinary Catheter:996998076}   Colostomy/Ileostomy/Ileal Conduit: {YES / QQ:72134}       Date of Last BM: ***    Intake/Output Summary (Last 24 hours) at 2022 1400  Last data filed at 2022 1247  Gross per 24 hour   Intake 1590 ml   Output 1500 ml   Net 90 ml     I/O last 3 completed shifts: In: 7018 [P.O.:500;  I.V.:1350]  Out: 3100 [Urine:3100]    Safety Concerns:     [email or web address] Safety Concerns:328575418}    Impairments/Disabilities:      [email or web address] Impairments/Disabilities:090670419}    Nutrition Therapy:  Current Nutrition Therapy:   [email or web address] Diet List:023819436}    Routes of Feeding: {P DME Other Feedings:065175075}  Liquids: {Slp liquid thickness:16582}  Daily Fluid Restriction: {CHP DME Yes amt example:229690806}  Last Modified Barium Swallow with Video (Video Swallowing Test): {Done Not Done VQJM:466985736}    Treatments at the Time of Hospital Discharge:   Respiratory Treatments: ***  Oxygen Therapy:  {Therapy; copd oxygen:44951}  Ventilator:    { CC Vent GTSZ:497026352}    Rehab Therapies: {THERAPEUTIC INTERVENTION:1703636314}  Weight Bearing Status/Restrictions: [employer] Weight Bearin}  Other Medical Equipment (for information only, NOT a DME order):  {EQUIPMENT:504472641}  Other Treatments: ***    Patient's personal belongings (please select all that are sent with patient):  {P DME Belongings:017025117}    RN SIGNATURE: {Esignature:211089316}    CASE MANAGEMENT/SOCIAL WORK SECTION    Inpatient Status Date: ***    Readmission Risk Assessment Score:  Readmission Risk              Risk of Unplanned Readmission:  22           Discharging to Facility/ Agency   Name:   Address:  Phone:  Fax:    Dialysis Facility (if applicable)   Name:  Address:  Dialysis Schedule:  Phone:  Fax:    / signature: {Esignature:900015663}    PHYSICIAN SECTION    Prognosis: {Prognosis:2354159493}    Condition at Discharge: [address] [place] [address] Patient Condition:232066454}    Rehab Potential (if transferring to Rehab): {Prognosis:0221572598}    Recommended Labs or Other Treatments After Discharge: ***    Physician Certification: I certify the above information and transfer of Kathia Delgado  is necessary for the continuing treatment of the diagnosis listed and that she requires {Admit to Appropriate Level of Care:00947} for {GREATER/LESS:644214000} 30 days.      Update Admission H&P: {CHP DME Changes in PPKYY:835561225}    PHYSICIAN SIGNATURE:  {Esignature:441585212}

## 2022-11-06 NOTE — PROGRESS NOTES
CLINICAL PHARMACY NOTE: MEDS TO BEDS    Total # of Prescriptions Filled: 2   The following medications were delivered to the patient:  Sodium bicarbonate  omeprazole    Additional Documentation: left in room with pt at 2:25

## 2022-11-07 LAB
GLUCOSE BLD-MCNC: 59 MG/DL (ref 65–105)
GLUCOSE BLD-MCNC: 59 MG/DL (ref 65–105)
GLUCOSE BLD-MCNC: 68 MG/DL (ref 65–105)

## 2022-11-09 NOTE — TELEPHONE ENCOUNTER
Repeat EGD/Dr David ROBERTS 12/7/22 @ 9:00 am     Verbal instructions given via phone  Written mailed

## 2022-11-11 NOTE — DISCHARGE SUMMARY
[address]     Department of Internal Medicine - Staff Internal Medicine Teaching Service    INPATIENT DISCHARGE SUMMARY      Patient Identification:  Binu Cortez is a 44 y.o. female. :  1985  MRN: [id number]     Acct: [de-identified]   PCP: Carlos Cordero MD  Admit Date:  2022  Discharge date and time: 2022  3:21 PM   Attending Provider: No att. providers found                                     [address] Problem Lists:  Principal Problem:    Hematemesis  Active Problems:    Ketoacidosis due to diabetes ([facility] [address]    Pyelonephritis    JORGE (acute kidney injury) ([facility] [address]    Stage 3b chronic kidney disease ([facility] [address]    Low serum bicarbonate    Gastrointestinal hemorrhage associated with peptic ulcer  Resolved Problems:    * No resolved hospital problems. *      HOSPITAL STAY     Brief Inpatient course: The patient was recently admitted to the hospital with the episodes of nonbloody vomiting in the morning following by hematemesis and abdominal pain. Patient left AMA in her previous admission, during the previous admission, her hemoglobin was 10.5, creatinine was 1.45 which was baseline, lipase was normal, occult blood was positive. CT abdomen showed pyelonephritis/renal infarct. She was started on IV ceftriaxone. She was also given IV PPI twice daily due to hematemesis likely secondary to Delmis-Suarez tear. GI was consulted who recommended conservative treatment and not EGD with Protonix 40 mg IV twice daily for 8 weeks and change to p.o. when the patient tolerates. After this, the patient left AMA. The patient presented to ED today with the similar complaints with increased intensity of abdominal pain and vomiting frequency. During evaluation, she had hematemesis with coffee-ground component/dark digested blood component in her vomitus. She also complains of abdominal and bilateral flank pain.  Considering her abdomen CT which showed pyelonephritis/renal infarct, she was started on IV ceftriaxone. GI was consulted initially, who did EGD which showed  Mild esophagitis, hiatal hernia, esophageal nodule. With continuous vomiting patient went into JORGE and DKA. Patient has History of CKD and presentation was JORGE on CKD. DKA protocol was started. Renal ultrasound ruled out hydronephrosis or nephrolithiasis. With IVF her creatinine improved and also BUN. Patient came out of DKA. Patient was stable medically and was advised to see nephrologist in 2-3 weeks. Procedures/ Significant Interventions:    EGD    Consults:     Consults:     Final Specialist Recommendations/Findings:   IP CONSULT TO INTERNAL MEDICINE  IP CONSULT TO CASE MANAGEMENT  IP CONSULT TO SPIRITUAL SERVICES  IP CONSULT TO GI  IP CONSULT TO DIABETES EDUCATOR  IP CONSULT TO NEPHROLOGY      Any Hospital Acquired Infections: none    Discharge Functional Status:  stable    DISCHARGE PLAN     Disposition: home    Patient Instructions:   Discharge Medication List as of 11/6/2022  3:02 PM        START taking these medications    Details   clonazePAM (KLONOPIN) 0.5 MG disintegrating tablet Take 1 tablet by mouth every 12 hours as needed for Anxiety for up to 5 days. , Disp-5 tablet, R-0Print      omeprazole (PRILOSEC) 40 MG delayed release capsule Take 1 capsule by mouth every morning (before breakfast), Disp-30 capsule, R-1Normal      sodium bicarbonate 650 MG tablet Take 2 tablets by mouth 2 times daily for 10 days, Disp-40 tablet, R-0Normal           CONTINUE these medications which have NOT CHANGED    Details   hydrOXYzine HCl (ATARAX) 25 MG tablet Take 25 mg by mouth 3 times daily as needed for Anxiety or ItchingHistorical Med      amLODIPine (NORVASC) 10 MG tablet Take 10 mg by mouth every eveningHistorical Med      gabapentin (NEURONTIN) 300 MG capsule Take 300 mg by mouth 3 times daily as needed. Historical Med      ATORVASTATIN CALCIUM PO Take 10 mg by mouth every eveningHistorical Med cetirizine (ZYRTEC) 10 MG tablet Take 10 mg by mouth every eveningHistorical Med      ondansetron (ZOFRAN ODT) 4 MG disintegrating tablet Take 1 tablet by mouth every 8 hours as needed for Nausea or Vomiting, Disp-12 tablet, R-0      AMITRIPTYLINE HCL PO Take 10 mg by mouth dailyHistorical Med      Insulin Infusion Pump BERTA Until Discontinued, Historical Med           STOP taking these medications       LOSARTAN POTASSIUM PO Comments:   Reason for Stopping:         vitamin D (CHOLECALCIFEROL) 1000 UNIT TABS tablet Comments:   Reason for Stopping:         Multiple Vitamins-Minerals (THERAPEUTIC MULTIVITAMIN-MINERALS) tablet Comments:   Reason for Stopping:         insulin aspart (NOVOLOG) 100 UNIT/ML injection vial Comments:   Reason for Stopping:               Activity: activity as tolerated    Diet: regular diet    Follow-up:    Jose Aceves MD  [address] [facility]    Schedule an appointment as soon as possible for a visit  Call to schedule Repeat upper endoscopy in 4 to 6 weeks to check healing of the esophageal nodule. Shelbi Cortez MD  [address] [place] [address], [address]  [facility]  [phone]    Schedule an appointment as soon as possible for a visit in 1 week(s)  Post hospital follow-up, management of insulin pump      Patient Instructions: You were seen and admitted for an infection around your kidneys, acid buildup in your blood due to the nausea and vomiting. Please take your medications as prescribed  Please follow-up with endocrinologist for management of her insulin pump. Please follow-up with kidney doctors  Please follow-up with your PCP.   Please follow with the stomach doctors  Please return if you have any worsening of your symptoms    Follow up labs: None  Follow up imaging: None    Note that over 30 minutes was spent in preparing discharge papers, discussing discharge with patient, medication review, etc.      Frieda Bautista MD  Internal Medicine Resident, PGY-1  [facility],  [place], [place].  11/10/2022, 11:51 PM

## 2022-11-23 ENCOUNTER — NURSE TRIAGE (OUTPATIENT)
Dept: OTHER | Facility: CLINIC | Age: 37
End: 2022-11-23

## 2022-11-23 ENCOUNTER — OFFICE VISIT (OUTPATIENT)
Dept: PRIMARY CARE CLINIC | Age: 37
End: 2022-11-23
Payer: COMMERCIAL

## 2022-11-23 VITALS
TEMPERATURE: 97 F | BODY MASS INDEX: 36.47 KG/M2 | HEART RATE: 87 BPM | OXYGEN SATURATION: 99 % | SYSTOLIC BLOOD PRESSURE: 119 MMHG | WEIGHT: 193 LBS | DIASTOLIC BLOOD PRESSURE: 77 MMHG

## 2022-11-23 DIAGNOSIS — Z76.89 ENCOUNTER TO ESTABLISH CARE: Primary | ICD-10-CM

## 2022-11-23 DIAGNOSIS — D50.0 IRON DEFICIENCY ANEMIA DUE TO CHRONIC BLOOD LOSS: ICD-10-CM
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
## 2022-11-23 DIAGNOSIS — Z13.9 SCREENING DUE: ICD-10-CM

## 2022-11-23 DIAGNOSIS — E10.65 TYPE 1 DIABETES MELLITUS WITH HYPERGLYCEMIA (HCC): ICD-10-CM

## 2022-11-23 PROCEDURE — 3044F HG A1C LEVEL LT 7.0%: CPT | Performed by: NURSE PRACTITIONER

## 2022-11-23 PROCEDURE — 99204 OFFICE O/P NEW MOD 45 MIN: CPT | Performed by: NURSE PRACTITIONER

## 2022-11-23 RX ORDER — LOSARTAN POTASSIUM 50 MG/1
TABLET ORAL
COMMUNITY
Start: 2022-11-13

## 2022-11-23 SDOH — ECONOMIC STABILITY: FOOD INSECURITY: WITHIN THE PAST 12 MONTHS, YOU WORRIED THAT YOUR FOOD WOULD RUN OUT BEFORE YOU GOT MONEY TO BUY MORE.: NEVER TRUE

## 2022-11-23 SDOH — ECONOMIC STABILITY: FOOD INSECURITY: WITHIN THE PAST 12 MONTHS, THE FOOD YOU BOUGHT JUST DIDN'T LAST AND YOU DIDN'T HAVE MONEY TO GET MORE.: NEVER TRUE

## 2022-11-23 ASSESSMENT — ENCOUNTER SYMPTOMS
NAUSEA: 0
COLOR CHANGE: 0
SORE THROAT: 0
SINUS PAIN: 0
VOMITING: 0
SINUS PRESSURE: 0
DIARRHEA: 0
BACK PAIN: 1
PHOTOPHOBIA: 0
SHORTNESS OF BREATH: 0
COUGH: 0
CHEST TIGHTNESS: 0
ABDOMINAL PAIN: 0

## 2022-11-23 ASSESSMENT — PATIENT HEALTH QUESTIONNAIRE - PHQ9
SUM OF ALL RESPONSES TO PHQ QUESTIONS 1-9: 0
1. LITTLE INTEREST OR PLEASURE IN DOING THINGS: 0
2. FEELING DOWN, DEPRESSED OR HOPELESS: 0
SUM OF ALL RESPONSES TO PHQ QUESTIONS 1-9: 0
SUM OF ALL RESPONSES TO PHQ9 QUESTIONS 1 & 2: 0

## 2022-11-23 ASSESSMENT — SOCIAL DETERMINANTS OF HEALTH (SDOH): HOW HARD IS IT FOR YOU TO PAY FOR THE VERY BASICS LIKE FOOD, HOUSING, MEDICAL CARE, AND HEATING?: NOT HARD AT ALL

## 2022-11-23 NOTE — PROGRESS NOTES
[facility]  [address] [address]  Dept: [phone]       Rashaad Diaz is a 44 y.o. female who presents today for her  medical conditions/complaintsas noted below. Rashaad Diaz is c/o of Neck Pain (Along with tail bone pain since pt fell 3 days ago ) and New Patient      HPI:     HPI    Patient is here to establish care. Moved to [place] 4 months ago from [place]. Most of her specialty care continues in [place] until she can establis locally. Patient with history of DM1 with insulin pump (endocrinology in [place]) and HTN. Patient had recent hospitalization for DKA caused by UTI with subsequent renal infarct. She has follow-ups scheduled with GI for EGD and Nephrology. States blood glucose levels at home typically between  with 1 day/week having episode of hypoglycemia with blood glucose less than 50. States she usually drinks mountain dew or apple juice to correct these episodes. Patient today is concerned after fall down 7 stairs and having worsening neck and low back pain. She denies any LOC and states she lost footing on the steps. She does have cervical spine tenderness but no changes in ROM and denies any associated numbness/tingling. Sitting aggravates the low back pain and is managing with conservative measures at home. Patient instructed to call insurance to find opthalmology office for routine eye exams    Diabetic foot exam completed-- sensation intact and skin is intact. Education on foot care provided.      Patient declined her influenza vaccine today but has plans to obtain covid booster from local pharmacy    Past Medical History:   Diagnosis Date    Diabetes mellitus ([facility] [address]     type 1    Hypertension       Past Surgical History:   Procedure Laterality Date    TUBAL LIGATION  11/2018    UPPER GASTROINTESTINAL ENDOSCOPY N/A 11/2/2022    EGD BIOPSY performed by Keven Naqvi MD at [facility]     Family History   Problem Relation Age of Onset    Anemia Mother     Cancer Maternal Grandmother     Anemia Maternal Grandmother     Cancer Maternal Grandfather      Social History     Tobacco Use    Smoking status: Never    Smokeless tobacco: Never   Substance Use Topics    Alcohol use: No      Current Outpatient Medications   Medication Sig Dispense Refill    glucagon 1 MG injection 1 kit by Other route      hydrOXYzine HCl (ATARAX) 25 MG tablet Take 25 mg by mouth 3 times daily as needed for Anxiety or Itching      gabapentin (NEURONTIN) 300 MG capsule Take 300 mg by mouth 3 times daily as needed. ATORVASTATIN CALCIUM PO Take 10 mg by mouth every evening      cetirizine (ZYRTEC) 10 MG tablet Take 10 mg by mouth every evening      ondansetron (ZOFRAN ODT) 4 MG disintegrating tablet Take 1 tablet by mouth every 8 hours as needed for Nausea or Vomiting 12 tablet 0    AMITRIPTYLINE HCL PO Take 10 mg by mouth daily      Insulin Infusion Pump BERTA by Does not apply route      losartan (COZAAR) 50 MG tablet       clonazePAM (KLONOPIN) 0.5 MG disintegrating tablet Take 1 tablet by mouth every 12 hours as needed for Anxiety for up to 5 days. (Patient not taking: Reported on 11/23/2022) 5 tablet 0    omeprazole (PRILOSEC) 40 MG delayed release capsule Take 1 capsule by mouth every morning (before breakfast) (Patient not taking: Reported on 11/23/2022) 30 capsule 1    amLODIPine (NORVASC) 10 MG tablet Take 10 mg by mouth every evening (Patient not taking: Reported on 11/23/2022)       No current facility-administered medications for this visit.      Allergies   Allergen Reactions    Latex Rash    Cinnamon Hives    Iodine Other (See Comments)     Kidney reaction    Morphine Other (See Comments)     Hallucinations         Health Maintenance   Topic Date Due    Varicella vaccine (1 of 2 - 2-dose childhood series) Never done    Diabetic foot exam  Never done    Lipids  Never done Depression Screen  Never done    HIV screen  Never done    Diabetic retinal exam  Never done    Hepatitis C screen  Never done    Hepatitis B vaccine (1 of 3 - Risk 3-dose series) Never done    DTaP/Tdap/Td vaccine (1 - Tdap) Never done    Cervical cancer screen  Never done    COVID-19 Vaccine (4 - Booster for Pfizer series) 12/16/2021    Flu vaccine (1) 08/01/2022    A1C test (Diabetic or Prediabetic)  10/31/2023    Pneumococcal 0-64 years Vaccine (3 - PPSV23 if available, else PCV20) 08/27/2049    Hepatitis A vaccine  Aged Out    Hib vaccine  Aged Out    Meningococcal (ACWY) vaccine  Aged Out       Review of Systems   Constitutional:  Negative for activity change, appetite change and fever. HENT:  Negative for sinus pressure, sinus pain and sore throat. Eyes:  Negative for photophobia and visual disturbance. Respiratory:  Negative for cough, chest tightness and shortness of breath. Cardiovascular:  Negative for chest pain. Gastrointestinal:  Negative for abdominal pain, diarrhea, nausea and vomiting. Endocrine: Negative for polydipsia and polyuria. Genitourinary:  Negative for difficulty urinating. Musculoskeletal:  Positive for back pain, myalgias, neck pain and neck stiffness. Skin:  Negative for color change. Neurological:  Negative for dizziness, light-headedness and headaches. Psychiatric/Behavioral:  Negative for behavioral problems. Objective:     Physical Exam  Vitals and nursing note reviewed. Constitutional:       General: She is not in acute distress. Appearance: Normal appearance. HENT:      Head: Normocephalic. Right Ear: External ear normal.      Left Ear: External ear normal.      Nose: Nose normal. No congestion or rhinorrhea. Mouth/Throat:      Mouth: Mucous membranes are dry. Eyes:      Conjunctiva/sclera: Conjunctivae normal.      Pupils: Pupils are equal, round, and reactive to light.    Neck:      Trachea: Trachea normal.   Cardiovascular: Rate and Rhythm: Normal rate and regular rhythm. Pulses: Normal pulses. Heart sounds: Normal heart sounds. No murmur heard. Pulmonary:      Effort: Pulmonary effort is normal. No respiratory distress. Breath sounds: Normal breath sounds. No wheezing. Abdominal:      Palpations: Abdomen is soft. Tenderness: There is no abdominal tenderness. Musculoskeletal:         General: No swelling or signs of injury. Normal range of motion. Cervical back: Normal range of motion. Pain with movement present. Normal range of motion. Skin:     General: Skin is warm and dry. Capillary Refill: Capillary refill takes less than 2 seconds. Neurological:      General: No focal deficit present. Mental Status: She is alert and oriented to person, place, and time. Mental status is at baseline. Motor: No weakness. Gait: Gait normal.   Psychiatric:         Behavior: Behavior normal.       Assessment:      No results found for this visit on 11/23/22. Carli was seen today for neck pain and new patient. Diagnoses and all orders for this visit:    Encounter to establish care    Type 1 diabetes mellitus with hyperglycemia ([facility] [address]  -     Hepatitis C Antibody; Future  -     Juan M Bui MD, Endocrinology, [facility], [place]    Fall, subsequent encounter  -     XR LUMBAR SPINE 1 VW; Future  -     XR CERVICAL SPINE 1 VW; Future    Iron deficiency anemia due to chronic blood loss  -     Mag Abdalla MD, Hematology/Oncology, [place]    Screening due  -     Lipid Panel; Future  -     HIV Screen; Future  -     Hepatitis C Antibody; Future        Return in about 4 weeks (around 12/21/2022). Plan of care reviewed with patient. Questions and concerns addressed to patient satisfaction. Follow up as directed.      Electronically signed by KARON Stevens CNP, PACon 11/23/2022 at 5:45 PM

## 2022-11-23 NOTE — PROGRESS NOTES
Visit Information    Have you changed or started any medications since your last visit including any over-the-counter medicines, vitamins, or herbal medicines? no   Are you having any side effects from any of your medications? -  no  Have you stopped taking any of your medications? Is so, why? -  no    Have you seen any other physician or provider since your last visit? No  Have you had any other diagnostic tests since your last visit? No  Have you been seen in the emergency room and/or had an admission to a hospital since we last saw you? No  Have you had your routine dental cleaning in the past 6 months? no    Have you activated your [employer] account? If not, what are your barriers?  Yes     Patient Care Team:  Antonio Lema MD as PCP - General (Internal Medicine)    Medical History Review  Past Medical, Family, and Social History reviewed and does not contribute to the patient presenting condition    Health Maintenance   Topic Date Due    Varicella vaccine (1 of 2 - 2-dose childhood series) Never done    Diabetic foot exam  Never done    Lipids  Never done    Depression Screen  Never done    HIV screen  Never done    Diabetic microalbuminuria test  Never done    Diabetic retinal exam  Never done    Hepatitis C screen  Never done    Hepatitis B vaccine (1 of 3 - Risk 3-dose series) Never done    DTaP/Tdap/Td vaccine (1 - Tdap) Never done    Cervical cancer screen  Never done    COVID-19 Vaccine (4 - Booster for Pfizer series) 12/16/2021    Flu vaccine (1) 08/01/2022    A1C test (Diabetic or Prediabetic)  10/31/2023    Pneumococcal 0-64 years Vaccine (3 - PPSV23 if available, else PCV20) 08/27/2049    Hepatitis A vaccine  Aged Out    Hib vaccine  Aged Out    Meningococcal (ACWY) vaccine  Aged Out

## 2022-11-23 NOTE — TELEPHONE ENCOUNTER
Location of patient: [address] call from Nathan Ayers at [facility] with [employer]. Subjective: Caller states a couple days ago fell down stairs hit tail bone on most the stairs and has been having difficulty standing after sitting for a long period of time. Denies hitting head, dizziness, or headaches    Current Symptoms: neck and tailbone pain    Onset: 3 days ago;       Pain Severity: 7/10; sore; Temperature: denies       Recommended disposition: See PCP within 3 Days    Care advice provided, patient verbalizes understanding; denies any other questions or concerns; instructed to call back for any new or worsening symptoms. Patient/Caller agrees with recommended disposition; writer provided warm transfer to MFITZ Chavez  at [facility] for appointment scheduling    Attention Provider: Thank you for allowing me to participate in the care of your patient. The patient was connected to triage in response to information provided to the ECC/PSC. Please do not respond through this encounter as the response is not directed to a shared pool.         Reason for Disposition   [1] MODERATE neck pain (e.g., interferes with normal activities) AND [2] present > 3 days    Protocols used: Neck Pain or Stiffness-ADULT-

## 2022-11-28 ENCOUNTER — HOSPITAL ENCOUNTER (OUTPATIENT)
Dept: GENERAL RADIOLOGY | Age: 37
Discharge: HOME | End: 2022-11-30
Payer: COMMERCIAL

## 2022-11-28 ENCOUNTER — HOSPITAL ENCOUNTER (OUTPATIENT)
Age: 37
Discharge: HOME | End: 2022-11-30
Payer: COMMERCIAL
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
## 2022-11-28 PROCEDURE — 72100 X-RAY EXAM L-S SPINE 2/3 VWS: CPT

## 2022-11-28 PROCEDURE — 72040 X-RAY EXAM NECK SPINE 2-3 VW: CPT

## 2022-11-30 ENCOUNTER — TELEPHONE (OUTPATIENT)
Dept: ONCOLOGY | Age: 37
End: 2022-11-30

## 2022-12-01 ENCOUNTER — TELEPHONE (OUTPATIENT)
Dept: PRIMARY CARE CLINIC | Age: 37
End: 2022-12-01

## 2022-12-01 NOTE — TELEPHONE ENCOUNTER
Pt called requesting a note for work for 11/30/22 and today due to having back issues and blood sugar has been very low . With a return date for  Monday 12/5/22. Pt will pick note up from up front if  pcp is able to write for her

## 2022-12-07 ENCOUNTER — ANESTHESIA (OUTPATIENT)
Dept: OPERATING ROOM | Age: 37
End: 2022-12-07
Payer: COMMERCIAL

## 2022-12-07 ENCOUNTER — HOSPITAL ENCOUNTER (OUTPATIENT)
Age: 37
Setting detail: OUTPATIENT SURGERY
Discharge: HOME | End: 2022-12-07
Attending: INTERNAL MEDICINE | Admitting: INTERNAL MEDICINE
Payer: COMMERCIAL

## 2022-12-07 ENCOUNTER — ANESTHESIA EVENT (OUTPATIENT)
Dept: OPERATING ROOM | Age: 37
End: 2022-12-07
Payer: COMMERCIAL

## 2022-12-07 VITALS
TEMPERATURE: 96.6 F | HEART RATE: 80 BPM | HEIGHT: 61 IN | DIASTOLIC BLOOD PRESSURE: 96 MMHG | BODY MASS INDEX: 35.87 KG/M2 | SYSTOLIC BLOOD PRESSURE: 147 MMHG | WEIGHT: 190 LBS | OXYGEN SATURATION: 99 % | RESPIRATION RATE: 18 BRPM

## 2022-12-07 DIAGNOSIS — K22.9 NODULE OF ESOPHAGUS: ICD-10-CM

## 2022-12-07 LAB — HCG, PREGNANCY URINE (POC): NEGATIVE

## 2022-12-07 PROCEDURE — 2500000003 HC RX 250 WO HCPCS: Performed by: NURSE ANESTHETIST, CERTIFIED REGISTERED

## 2022-12-07 PROCEDURE — 88305 TISSUE EXAM BY PATHOLOGIST: CPT

## 2022-12-07 PROCEDURE — 7100000011 HC PHASE II RECOVERY - ADDTL 15 MIN: Performed by: INTERNAL MEDICINE

## 2022-12-07 PROCEDURE — 7100000010 HC PHASE II RECOVERY - FIRST 15 MIN: Performed by: INTERNAL MEDICINE

## 2022-12-07 PROCEDURE — 3700000000 HC ANESTHESIA ATTENDED CARE: Performed by: INTERNAL MEDICINE

## 2022-12-07 PROCEDURE — 43254 EGD ENDO MUCOSAL RESECTION: CPT | Performed by: INTERNAL MEDICINE

## 2022-12-07 PROCEDURE — 2720000010 HC SURG SUPPLY STERILE: Performed by: INTERNAL MEDICINE

## 2022-12-07 PROCEDURE — 3609013500 HC EGD REMOVAL TUMOR POLYP/OTHER LESION SNARE TECH: Performed by: INTERNAL MEDICINE

## 2022-12-07 PROCEDURE — 2580000003 HC RX 258: Performed by: INTERNAL MEDICINE

## 2022-12-07 PROCEDURE — 88312 SPECIAL STAINS GROUP 1: CPT

## 2022-12-07 PROCEDURE — 2709999900 HC NON-CHARGEABLE SUPPLY: Performed by: INTERNAL MEDICINE

## 2022-12-07 PROCEDURE — 81025 URINE PREGNANCY TEST: CPT

## 2022-12-07 PROCEDURE — 6360000002 HC RX W HCPCS: Performed by: NURSE ANESTHETIST, CERTIFIED REGISTERED

## 2022-12-07 PROCEDURE — 3700000001 HC ADD 15 MINUTES (ANESTHESIA): Performed by: INTERNAL MEDICINE

## 2022-12-07 PROCEDURE — 88342 IMHCHEM/IMCYTCHM 1ST ANTB: CPT

## 2022-12-07 RX ORDER — LIDOCAINE HYDROCHLORIDE 10 MG/ML
INJECTION, SOLUTION EPIDURAL; INFILTRATION; INTRACAUDAL; PERINEURAL PRN
Status: DISCONTINUED | OUTPATIENT
Start: 2022-12-07 | End: 2022-12-07 | Stop reason: SDUPTHER

## 2022-12-07 RX ORDER — PROPOFOL 10 MG/ML
INJECTION, EMULSION INTRAVENOUS PRN
Status: DISCONTINUED | OUTPATIENT
Start: 2022-12-07 | End: 2022-12-07 | Stop reason: SDUPTHER

## 2022-12-07 RX ORDER — SODIUM CHLORIDE, SODIUM LACTATE, POTASSIUM CHLORIDE, CALCIUM CHLORIDE 600; 310; 30; 20 MG/100ML; MG/100ML; MG/100ML; MG/100ML
INJECTION, SOLUTION INTRAVENOUS CONTINUOUS
Status: DISCONTINUED | OUTPATIENT
Start: 2022-12-07 | End: 2022-12-07 | Stop reason: HOSPADM

## 2022-12-07 RX ORDER — PROPOFOL 10 MG/ML
INJECTION, EMULSION INTRAVENOUS CONTINUOUS PRN
Status: DISCONTINUED | OUTPATIENT
Start: 2022-12-07 | End: 2022-12-07 | Stop reason: SDUPTHER

## 2022-12-07 RX ORDER — FENTANYL CITRATE 50 UG/ML
25 INJECTION, SOLUTION INTRAMUSCULAR; INTRAVENOUS EVERY 5 MIN PRN
Status: CANCELLED | OUTPATIENT
Start: 2022-12-07

## 2022-12-07 RX ORDER — SODIUM CHLORIDE 0.9 % (FLUSH) 0.9 %
5-40 SYRINGE (ML) INJECTION PRN
Status: CANCELLED | OUTPATIENT
Start: 2022-12-07

## 2022-12-07 RX ORDER — SODIUM CHLORIDE 9 MG/ML
INJECTION, SOLUTION INTRAVENOUS PRN
Status: CANCELLED | OUTPATIENT
Start: 2022-12-07

## 2022-12-07 RX ORDER — SODIUM CHLORIDE 0.9 % (FLUSH) 0.9 %
5-40 SYRINGE (ML) INJECTION EVERY 12 HOURS SCHEDULED
Status: CANCELLED | OUTPATIENT
Start: 2022-12-07

## 2022-12-07 RX ADMIN — LIDOCAINE HYDROCHLORIDE 50 MG: 10 INJECTION, SOLUTION EPIDURAL; INFILTRATION; INTRACAUDAL; PERINEURAL at 09:06

## 2022-12-07 RX ADMIN — SODIUM CHLORIDE, POTASSIUM CHLORIDE, SODIUM LACTATE AND CALCIUM CHLORIDE: 600; 310; 30; 20 INJECTION, SOLUTION INTRAVENOUS at 08:42

## 2022-12-07 RX ADMIN — PROPOFOL 20 MG: 10 INJECTION, EMULSION INTRAVENOUS at 09:10

## 2022-12-07 RX ADMIN — PROPOFOL 30 MG: 10 INJECTION, EMULSION INTRAVENOUS at 09:15

## 2022-12-07 RX ADMIN — PROPOFOL 125 MCG/KG/MIN: 10 INJECTION, EMULSION INTRAVENOUS at 09:19

## 2022-12-07 RX ADMIN — PROPOFOL 30 MG: 10 INJECTION, EMULSION INTRAVENOUS at 09:12

## 2022-12-07 RX ADMIN — PROPOFOL 20 MG: 10 INJECTION, EMULSION INTRAVENOUS at 09:17

## 2022-12-07 RX ADMIN — PROPOFOL 100 MG: 10 INJECTION, EMULSION INTRAVENOUS at 09:06

## 2022-12-07 ASSESSMENT — PAIN - FUNCTIONAL ASSESSMENT: PAIN_FUNCTIONAL_ASSESSMENT: NONE - DENIES PAIN

## 2022-12-07 NOTE — ANESTHESIA PRE PROCEDURE
Department of Anesthesiology  Preprocedure Note       Name:  Kingsley Painter   Age:  44 y.o.  :  1985                                          MRN:  [id number]         Date:  2022      Surgeon: Jacqueline Fowler):  Natalya Sanchez MD    Procedure: Procedure(s):  EGD ESOPHAGOGASTRODUODENOSCOPY    Medications prior to admission:   Prior to Admission medications    Medication Sig Start Date End Date Taking? Authorizing Provider   glucagon 1 MG injection 1 kit by Other route 3/13/17   Historical Provider, MD   losartan (COZAAR) 50 MG tablet  22   Historical Provider, MD   clonazePAM (KLONOPIN) 0.5 MG disintegrating tablet Take 1 tablet by mouth every 12 hours as needed for Anxiety for up to 5 days. Patient not taking: Reported on 2022  Rubina Quintana MD   omeprazole (PRILOSEC) 40 MG delayed release capsule Take 1 capsule by mouth every morning (before breakfast)  Patient not taking: Reported on 2022  Rubina Quintana MD   hydrOXYzine HCl (ATARAX) 25 MG tablet Take 25 mg by mouth 3 times daily as needed for Anxiety or Itching    Historical Provider, MD   amLODIPine (NORVASC) 10 MG tablet Take 10 mg by mouth every evening  Patient not taking: Reported on 2022    Historical Provider, MD   gabapentin (NEURONTIN) 300 MG capsule Take 300 mg by mouth 3 times daily as needed.     Historical Provider, MD   ATORVASTATIN CALCIUM PO Take 10 mg by mouth every evening    Historical Provider, MD   cetirizine (ZYRTEC) 10 MG tablet Take 10 mg by mouth every evening    Historical Provider, MD   ondansetron (ZOFRAN ODT) 4 MG disintegrating tablet Take 1 tablet by mouth every 8 hours as needed for Nausea or Vomiting 17   Kody Hanson MD   AMITRIPTYLINE HCL PO Take 10 mg by mouth daily    Historical Provider, MD   Insulin Infusion Pump BERTA by Does not apply route    Historical Provider, MD       Current medications:    Current Facility-Administered Medications Medication Dose Route Frequency Provider Last Rate Last Admin    lactated ringers infusion   IntraVENous Continuous Pili Fowler MD           Allergies: Allergies   Allergen Reactions    Latex Rash    Cinnamon Hives    Iodine Other (See Comments)     Kidney reaction    Morphine Other (See Comments)     Hallucinations         Problem List:    Patient Active Problem List   Diagnosis Code    Hematemesis K92.0    Type 1 diabetes mellitus with stage 3a chronic kidney disease (HCC) E10.22, N18.31    Pyelonephritis N12    Normocytic anemia D64.9    JORGE (acute kidney injury) ([facility] [address] N17.9    Insulin pump in place Z96.41    Proteinuria due to type 1 diabetes mellitus (HCC) E10.29, R80.9    Anxiety F41.9    Anemia of chronic disease D63.8    Ketoacidosis due to diabetes (HCC) E11.10    Stage 3b chronic kidney disease (HCC) N18.32    Low serum bicarbonate R79.89    Gastrointestinal hemorrhage associated with peptic ulcer K27.4       Past Medical History:        Diagnosis Date    CKD [facility]chronic [facility] disease[facility] stage III [facility]     Diabetes mellitus ([facility] [address]     type 1    Hypertension     Hypertension     Insulin pump in place     Renal infarct ([facility] [address] 2022    Due to UTI/DKA    Under care of team 12/06/2022    ENDOCRINOLOGY - SEES DRS IN [place] - CHANGING TO LOCAL CARE    Under care of team 12/06/2022    GI - DR. ERAZO - 12/2022    Under care of team 12/06/2022    HEM/ONC - DR. Loi Lu    Under care of team 11/2022    NEPHROLOGY - To schedule appointment.  Wellness examination 12/06/2022    PCP - Amado Arthur CNP - last visit 11/2022       Past Surgical History:        Procedure Laterality Date    TUBAL LIGATION  11/2018    UPPER GASTROINTESTINAL ENDOSCOPY N/A 11/2/2022    EGD BIOPSY performed by Lien Smith MD at [facility]       Social History:    Social History     Tobacco Use    Smoking status: Never    Smokeless tobacco: Never   Substance Use Topics    Alcohol use:  No Counseling given: Not Answered      Vital Signs (Current):   Vitals:    12/07/22 0815   BP: (!) 138/118   Pulse: 96   Resp: 18   Temp: 97.3 °F (36.3 °C)   TempSrc: Temporal   SpO2: 98%   Weight: 190 lb (86.2 kg)   Height: 5' 1\" (1.549 m)                                              BP Readings from Last 3 Encounters:   12/07/22 (!) 138/118   11/23/22 119/77   11/06/22 (!) 170/104       NPO Status:                                                                                 BMI:   Wt Readings from Last 3 Encounters:   12/07/22 190 lb (86.2 kg)   11/23/22 193 lb (87.5 kg)   11/04/22 208 lb (94.3 kg)     Body mass index is 35.9 kg/m². CBC:   Lab Results   Component Value Date/Time    WBC 9.4 11/04/2022 08:59 AM    RBC 2.81 11/04/2022 08:59 AM    HGB 7.9 11/04/2022 08:59 AM    HCT 24.0 11/04/2022 08:59 AM    MCV 85.4 11/04/2022 08:59 AM    RDW 13.9 11/04/2022 08:59 AM    PLT See Reflexed IPF Result 11/04/2022 08:59 AM       CMP:   Lab Results   Component Value Date/Time     11/06/2022 06:42 AM    K 3.8 11/06/2022 06:42 AM     11/06/2022 06:42 AM    CO2 15 11/06/2022 06:42 AM    BUN 14 11/06/2022 06:42 AM    CREATININE 1.70 11/06/2022 06:42 AM    GFRAA 41 08/04/2019 07:56 PM    AGRATIO 0.9 08/04/2019 07:56 PM    LABGLOM 39 11/06/2022 06:42 AM    GLUCOSE 392 11/06/2022 06:42 AM    PROT 7.7 11/01/2022 11:26 AM    CALCIUM 7.7 11/06/2022 06:42 AM    BILITOT 0.5 11/01/2022 11:26 AM    ALKPHOS 72 11/01/2022 11:26 AM    AST 23 11/01/2022 11:26 AM    ALT 11 11/01/2022 11:26 AM       POC Tests: No results for input(s): POCGLU, POCNA, POCK, POCCL, POCBUN, POCHEMO, POCHCT in the last 72 hours.     Coags: No results found for: PROTIME, INR, APTT    HCG (If Applicable):   Lab Results   Component Value Date    PREGTESTUR Negative 05/13/2015        ABGs: No results found for: PHART, PO2ART, SCE2MPG, ZCD1QRN, BEART, O4LKBPIW     Type & Screen (If Applicable):  No results found for: LABABO, [address]    Drug/Infectious Status (If Applicable):  No results found for: HIV, HEPCAB    COVID-19 Screening (If Applicable): No results found for: COVID19        Anesthesia Evaluation  Patient summary reviewed no history of anesthetic complications:   Airway: Mallampati: II  TM distance: >3 FB   Neck ROM: full  Mouth opening: > = 3 FB   Dental: normal exam         Pulmonary:Negative Pulmonary ROS and normal exam                               Cardiovascular:    (+) hypertension:,                   Neuro/Psych:   Negative Neuro/Psych ROS              GI/Hepatic/Renal:   (+) GERD:, PUD,           Endo/Other:    (+) DiabetesType I DM, , .                 Abdominal:             Vascular: Other Findings:           Anesthesia Plan      MAC     ASA 2             Anesthetic plan and risks discussed with patient. Use of blood products discussed with patient whom consented to blood products. Plan discussed with CRNA.                     Miguel Rosen MD   12/7/2022

## 2022-12-07 NOTE — FLOWSHEET NOTE
Discharge criteria met. Discharge instructions given/reviewed with the patient with family present with verbalized understanding confirmed.

## 2022-12-07 NOTE — ANESTHESIA POSTPROCEDURE EVALUATION
Department of Anesthesiology  Postprocedure Note    Patient: Jean Jimenez  MRN: [id number]  [place]: 1985  Date of evaluation: 12/7/2022      Procedure Summary     Date: 12/07/22 Room / Location: [address]    Anesthesia Start: 7840 Anesthesia Stop: 8514    Procedure: EGD POLYP SNARE, HOT (BIOPSY AT SAME SITE, SAME NODULE) Diagnosis:       Nodule of esophagus      (ESOPHAGEAL NODULE)    Surgeons: Barry Reyes MD Responsible Provider: Bernice Sheppard MD    Anesthesia Type: MAC ASA Status: 2          Anesthesia Type: No value filed.     Donte Phase I: Donte Score: 10    Donte Phase II: Donte Score: 10      Anesthesia Post Evaluation    Patient location during evaluation: bedside  Patient participation: complete - patient participated  Level of consciousness: awake  Airway patency: patent  Nausea & Vomiting: no nausea and no vomiting  Complications: no  Cardiovascular status: hemodynamically stable  Respiratory status: acceptable  Hydration status: stable  Comments: BP (!) 147/96   Pulse 80   Temp (!) 96.6 °F (35.9 °C) (Temporal)   Resp 18   Ht 5' 1\" (1.549 m)   Wt 190 lb (86.2 kg)   SpO2 99%   BMI 35.90 kg/m²

## 2022-12-07 NOTE — H&P
History and Physical    Pt Name: Binu Cortez  MRN: [id number]  YOB: 1985  Date of evaluation: 12/7/2022  Primary Care Physician: KARON Church CNP    SUBJECTIVE:   History of Chief Complaint:    Binu Cortez is a 44 y.o. female who is scheduled today for EGD. Admitted 11/1/22-11/6/22, she reports having UTI, kidney infection. She reports history of having abdominal pain and hematemesis as well as anemia. EGD completed 11/2/22 with Dr. Karl Landis. She has history of type 1 Diabetes, uses insulin pump- on her right arm today. Allergies  is allergic to latex, cinnamon, iodine, and morphine. Medications  Prior to Admission medications    Medication Sig Start Date End Date Taking? Authorizing Provider   glucagon 1 MG injection 1 kit by Other route 3/13/17   Historical Provider, MD   losartan (COZAAR) 50 MG tablet  11/13/22   Historical Provider, MD   clonazePAM (KLONOPIN) 0.5 MG disintegrating tablet Take 1 tablet by mouth every 12 hours as needed for Anxiety for up to 5 days. Patient not taking: Reported on 11/23/2022 11/6/22 11/11/22  Priya Harris MD   omeprazole (PRILOSEC) 40 MG delayed release capsule Take 1 capsule by mouth every morning (before breakfast)  Patient not taking: Reported on 11/23/2022 11/6/22 12/6/22  Priya Harris MD   hydrOXYzine HCl (ATARAX) 25 MG tablet Take 25 mg by mouth 3 times daily as needed for Anxiety or Itching    Historical Provider, MD   amLODIPine (NORVASC) 10 MG tablet Take 10 mg by mouth every evening  Patient not taking: No sig reported    Historical Provider, MD   gabapentin (NEURONTIN) 300 MG capsule Take 300 mg by mouth 3 times daily as needed.     Historical Provider, MD   ATORVASTATIN CALCIUM PO Take 10 mg by mouth every evening  Patient not taking: Reported on 12/7/2022    Historical Provider, MD   cetirizine (ZYRTEC) 10 MG tablet Take 10 mg by mouth every evening    Historical Provider, MD   ondansetron (ZOFRAN ODT) 4 MG disintegrating tablet Take 1 tablet by mouth every 8 hours as needed for Nausea or Vomiting  Patient not taking: Reported on 12/7/2022 1/26/17   Isabel Seasy MD   AMITRIPTYLINE HCL PO Take 10 mg by mouth daily  Patient not taking: Reported on 12/7/2022    Historical Provider, MD   Insulin Infusion Pump BERTA by Does not apply route    Historical Provider, MD     Past Medical History    has a past medical history of CKD (chronic kidney disease), stage III ([facility] [address] Diabetes mellitus type 1 ([facility] [address] History of anemia, History of seizures, Hypertension, Insulin pump in place, Neuropathy, 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 Past Surgical History   has a past surgical history that includes Tubal ligation (11/2018) and Upper gastrointestinal endoscopy (N/A, 11/2/2022). Social History   reports that she has never smoked. She has never used smokeless tobacco.   reports no history of alcohol use. reports no history of drug use. Marital Status single  Children none  Occupation none  Family History  Family Status   Relation Name Status    Mother  (Not Specified)    MGM  (Not Specified)    MGF  (Not Specified)     family history includes Anemia in her maternal grandmother and mother; Cancer in her maternal grandfather and maternal grandmother.     Review of Systems:  CONSTITUTIONAL:   negative for fevers, chills, fatigue and malaise    EYES:   negative for double vision, blurred vision and photophobia    HEENT:   negative for tinnitus, epistaxis and sore throat     RESPIRATORY:   negative for cough, shortness of breath, wheezing     CARDIOVASCULAR:   negative for chest pain, palpitations, syncope, edema     GASTROINTESTINAL:   See HPI   GENITOURINARY:   negative for incontinence     MUSCULOSKELETAL:   negative for neck pain +lumbar pain from recent fall   NEUROLOGICAL:   Negative for weakness and tingling  negative for headaches and dizziness     PSYCHIATRIC: negative for anxiety       OBJECTIVE:   VITALS:  height is 5' 1\" (1.549 m) and weight is 190 lb (86.2 kg). Her temporal temperature is 97.3 °F (36.3 °C). Her blood pressure is 138/118 (abnormal) and her pulse is 96. Her respiration is 18 and oxygen saturation is 98%. CONSTITUTIONAL:alert & oriented x 3, no acute distress. Calm and pleasant. SKIN:  Warm and dry, no rashes on exposed areas of skin. Right nares piercing  HEAD:  Normocephalic, atraumatic   EYES: PERRL. EOMs intact. EARS:  Equal bilaterally, no edema or thickening, skin is intact without lumps or lesions. No discharge. Hearing grossly WNL. NOSE:  Nares patent. No rhinorrhea   MOUTH/THROAT:  Mucous membranes moist, tongue is pink, uvula midline, teeth appear to be intact, tongue piercing noted - pt states she will remove prior to procedure  NECK:Full ROM  LUNGS: Respirations even and non-labored. Clear to auscultation bilaterally, no wheezes, rales, or rhonchi. CARDIOVASCULAR: Regular rate and rhythm, no murmurs/rubs/gallops   ABDOMEN: soft, non-tender, non-distended, bowel sounds active x 4   EXTREMITIES: No edema bilateral lower extremities. No varicosities bilateral lower extremities. NEUROLOGIC: CN II-XII are grossly intact. Gait not assessed.   IMPRESSIONS:   ESOPHAGEAL NODULE  PLANS:   EGD    KARON Patrick CNP   Electronically signed 12/7/2022 at 8:47 AM

## 2022-12-07 NOTE — DISCHARGE INSTRUCTIONS
POST-ENDOSCOPY INSTRUCTIONS:    1. ACTIVITY   No driving, operating machinery, or making important decisions for 24 hours. Resume normal activity after 24 hours. You may return to work after 24 hours. 2. DIET     ____ (EGD):  Do not eat or drink for one hour after your exam.  You may then   try a sip of water, and if you are able to swallow as usual you may advance to a   regular diet. ____ Diet Modification:    3. MEDICATIONS (Do not consume alcohol, tranquilizers, or sleeping medications for 24           hours unless advised by your physician)       _____ Resume your usual medications    4. PHYSICIAN FOLLOW-UP        ____ Please call GI clinic at [phone] for biopsy results in 10 days and for follow-up appointment with Dr. Florence Gordon for further instructions in 4-6 weeks. ____ See your family physician. 5. ADDITIONAL INSTRUCTIONS         Continue Pantoprazole 40 mg daily for 4 weeks. 6. NORMAL CHANGES YOU MAY EXPERIENCE AFTER ENDOSCOPY:          EGD           Sore throat after EGD         A bloated feeling and belching from     air in stomach          If a biopsy was done, you may spit      up some blood tinged mucous      You may feel fatigued for the next 24-48   hours due to the prep and sedation    7. CALL YOUR PHYSICIAN IF YOU EXPERIENCE ANY OF THE FOLLOWING:      A.  Passing blood rectally or vomiting blood (color may be red or black)      B. Severe abdominal pain or tenderness (that is not relieved by passing air)      C.   Fever, chills, or excessive sweating      D.  Persistent nausea or vomiting      E.  Redness or swelling at the IV site    If you have additional questions, PLEASE call Dr. Florence Gordon at [phone] or the [place] [address] [address] [phone]

## 2022-12-07 NOTE — OP NOTE
Operative Note      Patient: Almita Gupta  YOB: 1985  MRN: [id number]    Date of Procedure: 12/7/2022    Pre-Op Diagnosis: ESOPHAGEAL NODULE    Post-Op Diagnosis: Same  8 mm sessile nodule at the GE junction. Resected with a hot snare. Clipped x1. Otherwise normal esophagus. Normal stomach and duodenum. Procedure(s):  EGD POLYP SNARE, HOT    Surgeon(s):  Arsen Vazquez MD    Assistant:   First Assistant: Rex Clemons RN    Anesthesia: Monitor Anesthesia Care    Estimated Blood Loss (mL): Minimal    Complications: None    Specimens:   ID Type Source Tests Collected by Time Destination   A : 94 Ortizsammi Bosch MD 12/7/2022 0972        Implants:  * No implants in log *      Drains: * No LDAs found *    Findings: The GE junction was noted at 38 cm from the incisors. A 8 mm sessile nodule was found at the GE junction in the cardia. This was injected with 2 mL of sterile saline to create a submucosal lift and then resected with a hot snare. Resection and retrieval were complete. One ultra Hemoclip was placed to prevent delayed bleeding. There was no bleeding during or at the end of the procedure. Remainder of the esophagus appeared normal.  Stomach mucosa appeared normal on forward and retroflexed views. The examined duodenum appeared normal.    Recommendations  Discharge home after cleared by anesthesia and per nursing protocol. Await pathology results. Follow-up in the GI clinic in 4 to 6 weeks to discuss further management. Continue pantoprazole 40 mg daily for 4 weeks. ENDOSCOPIC CLIP INSTRUCTIONS: An  Endo clip was placed today during the Endoscopy procedure. Because this clip is metal, it is recommended that an MRI [magnetic resonance imaging] scan not be performed for 60 days. However, if this is medically necessary it is recommended that your Doctor order an xray to ensure that the clip is gone.   Please call the GI clinic or Endoscopy unit for any questions or concerns prior to having the MRI. Detailed Description of Procedure:   Informed consent was obtained from the patient after explanation of the procedure including indications, description of the procedure,  benefits and possible risks and complications of the procedure, and alternatives. Questions were answered. The patient's history was reviewed and a directed physical examination was performed prior to the procedure. Patient was monitored throughout the procedure with pulse oximetry and periodic assessment of vital signs. Patient was sedated as noted above. With the patient in the left lateral decubitus position, the Olympus videoendoscope was placed in the patient's mouth and under direct visualization passed into the esophagus. Visualization of the esophagus, stomach, and duodenum was performed during both introduction and withdrawal of the endoscope and retroflexed view of the proximal stomach was obtained. The scope was passed to the 3rd portion of the duodenum. The patient tolerated the procedure well and was taken to the recovery area in good condition. The patient  was taken to the recovery area in good condition.      Electronically signed by Cesia Mishra MD on 12/7/2022 at 9:25 AM

## 2022-12-09 LAB — SURGICAL PATHOLOGY REPORT: NORMAL

## 2022-12-12 ENCOUNTER — OFFICE VISIT (OUTPATIENT)
Dept: FAMILY MEDICINE CLINIC | Age: 37
End: 2022-12-12
Payer: COMMERCIAL

## 2022-12-12 VITALS
HEIGHT: 61 IN | SYSTOLIC BLOOD PRESSURE: 84 MMHG | HEART RATE: 110 BPM | TEMPERATURE: 99.5 F | BODY MASS INDEX: 35.87 KG/M2 | DIASTOLIC BLOOD PRESSURE: 57 MMHG | WEIGHT: 190 LBS

## 2022-12-12 DIAGNOSIS — J10.1 INFLUENZA A: Primary | ICD-10-CM

## 2022-12-12 DIAGNOSIS — R68.89 FLU-LIKE SYMPTOMS: ICD-10-CM

## 2022-12-12 LAB
INFLUENZA A ANTIBODY: ABNORMAL
INFLUENZA B ANTIBODY: ABNORMAL

## 2022-12-12 PROCEDURE — G8417 CALC BMI ABV UP PARAM F/U: HCPCS

## 2022-12-12 PROCEDURE — 1036F TOBACCO NON-USER: CPT

## 2022-12-12 PROCEDURE — G8484 FLU IMMUNIZE NO ADMIN: HCPCS

## 2022-12-12 PROCEDURE — 99203 OFFICE O/P NEW LOW 30 MIN: CPT

## 2022-12-12 PROCEDURE — 87804 INFLUENZA ASSAY W/OPTIC: CPT

## 2022-12-12 PROCEDURE — G8427 DOCREV CUR MEDS BY ELIG CLIN: HCPCS

## 2022-12-12 RX ORDER — OSELTAMIVIR PHOSPHATE 30 MG/1
30 CAPSULE ORAL 2 TIMES DAILY
Qty: 10 CAPSULE | Refills: 0 | Status: SHIPPED | OUTPATIENT
Start: 2022-12-12 | End: 2022-12-17

## 2022-12-12 ASSESSMENT — ENCOUNTER SYMPTOMS
EYE PAIN: 0
SWOLLEN GLANDS: 0
SORE THROAT: 1
CHANGE IN BOWEL HABIT: 0
COUGH: 1
EYE ITCHING: 0
NAUSEA: 1
ABDOMINAL PAIN: 0
FLU SYMPTOMS: 1
VOMITING: 0

## 2022-12-12 NOTE — PROGRESS NOTES
[address] [facility] [address]  Dept: [phone]  Dept Fax: [phone]    Dominga Peñaloza is a 44 y.o. female who presents to the urgent care today for her medical conditions/complaints as notedbelow. Dominga Peñaloza is c/o of Cough      HPI:     Patient states that She took a xanax before coming to appointment which might attribute to low BP as well as dehydration  PMH: DM 1, CKD     Influenza  This is a new problem. The current episode started yesterday. The problem occurs constantly. The problem has been gradually worsening. Associated symptoms include chills, congestion, coughing, diaphoresis, headaches, myalgias, nausea and a sore throat. Pertinent negatives include no abdominal pain, change in bowel habit, chest pain, fatigue, fever, neck pain, numbness, rash, swollen glands, urinary symptoms, vomiting or weakness. Nothing aggravates the symptoms. She has tried nothing for the symptoms. Past Medical History:   Diagnosis Date    CKD (chronic kidney disease), stage III ([facility] [address]     Diabetes mellitus type 1 ([facility] [address]     History of anemia     reports prior blood transfusion x 2- is establishing care locally, recent move from [facility]    History of seizures     last time 10 years ago, states related to low blood sugar    Hypertension     Insulin pump in place     Neuropathy     Renal infarct [facility]) 2022    Due to UTI/DKA    Under care of team 12/06/2022    ENDOCRINOLOGY - SEES DRS IN [place] - CHANGING TO LOCAL CARE    Under care of team 12/06/2022    GI - DR. ERAZO - 12/2022    Under care of team 12/06/2022    HEM/ONC - DR. Kayley Burrell    Under care of team 11/2022    NEPHROLOGY - To schedule appointment.     Wellness examination 12/06/2022    PCP - Niko Burt CNP - last visit 11/2022        Current Outpatient Medications   Medication Sig Dispense Refill    oseltamivir (TAMIFLU) 30 MG capsule Take 1 capsule by mouth 2 times daily for 5 days 10 capsule 0    glucagon 1 MG injection 1 kit by Other route      losartan (COZAAR) 50 MG tablet       clonazePAM (KLONOPIN) 0.5 MG disintegrating tablet Take 1 tablet by mouth every 12 hours as needed for Anxiety for up to 5 days. (Patient not taking: Reported on 11/23/2022) 5 tablet 0    omeprazole (PRILOSEC) 40 MG delayed release capsule Take 1 capsule by mouth every morning (before breakfast) (Patient not taking: Reported on 11/23/2022) 30 capsule 1    hydrOXYzine HCl (ATARAX) 25 MG tablet Take 25 mg by mouth 3 times daily as needed for Anxiety or Itching      amLODIPine (NORVASC) 10 MG tablet Take 10 mg by mouth every evening (Patient not taking: No sig reported)      gabapentin (NEURONTIN) 300 MG capsule Take 300 mg by mouth 3 times daily as needed. ATORVASTATIN CALCIUM PO Take 10 mg by mouth every evening (Patient not taking: Reported on 12/7/2022)      cetirizine (ZYRTEC) 10 MG tablet Take 10 mg by mouth every evening      ondansetron (ZOFRAN ODT) 4 MG disintegrating tablet Take 1 tablet by mouth every 8 hours as needed for Nausea or Vomiting (Patient not taking: Reported on 12/7/2022) 12 tablet 0    AMITRIPTYLINE HCL PO Take 10 mg by mouth daily (Patient not taking: Reported on 12/7/2022)      Insulin Infusion Pump BERTA by Does not apply route       No current facility-administered medications for this visit. Allergies   Allergen Reactions    Latex Rash    Cinnamon Hives     Itching and hives    Iodides      Other reaction(s): Other (See Comments)  Kidney reaction    Iodine Other (See Comments)     Kidney reaction    Morphine Other (See Comments)     Hallucinations    Hallucinations. She is able to take Dilaudid  Hallucinations. She is able to take Dilaudid         Subjective:      Review of Systems   Constitutional:  Positive for activity change, appetite change, chills and diaphoresis. Negative for fatigue and fever.    HENT:  Positive for congestion and sore throat. Eyes:  Negative for pain and itching. Respiratory:  Positive for cough. Cardiovascular:  Negative for chest pain. Gastrointestinal:  Positive for nausea. Negative for abdominal pain, change in bowel habit and vomiting. Genitourinary:  Negative for difficulty urinating and dysuria. Musculoskeletal:  Positive for myalgias. Negative for neck pain. Skin:  Negative for rash. Neurological:  Positive for light-headedness and headaches. Negative for dizziness, weakness and numbness. All other systems reviewed and are negative. 14 systems reviewed and negative except as listed in HPI. Objective:     Physical Exam  Vitals reviewed. Constitutional:       General: She is not in acute distress. Appearance: She is obese. She is ill-appearing. She is not toxic-appearing or diaphoretic. HENT:      Head: Normocephalic and atraumatic. Right Ear: Tympanic membrane normal. No swelling or tenderness. No middle ear effusion. Tympanic membrane is not erythematous. Left Ear: Tympanic membrane normal. No swelling or tenderness. No middle ear effusion. Tympanic membrane is not erythematous. Nose: Nasal tenderness, congestion and rhinorrhea present. Right Sinus: No maxillary sinus tenderness or frontal sinus tenderness. Left Sinus: No maxillary sinus tenderness or frontal sinus tenderness. Mouth/Throat:      Lips: Pink. Mouth: Mucous membranes are moist.      Pharynx: Oropharynx is clear. Posterior oropharyngeal erythema present. No pharyngeal swelling or oropharyngeal exudate. Eyes:      General:         Right eye: No discharge. Left eye: No discharge. Extraocular Movements: Extraocular movements intact. Conjunctiva/sclera: Conjunctivae normal.      Pupils: Pupils are equal, round, and reactive to light. Cardiovascular:      Rate and Rhythm: Regular rhythm. Tachycardia present. Heart sounds: Normal heart sounds.  No murmur heard. No friction rub. Pulmonary:      Effort: Pulmonary effort is normal. No respiratory distress. Breath sounds: Normal breath sounds. No stridor. No wheezing, rhonchi or rales. Chest:      Chest wall: No tenderness. Musculoskeletal:         General: No swelling or tenderness. Normal range of motion. Cervical back: Normal range of motion and neck supple. No rigidity or tenderness. Lymphadenopathy:      Cervical: Cervical adenopathy present. Skin:     General: Skin is warm and dry. Capillary Refill: Capillary refill takes less than 2 seconds. Findings: No erythema or rash. Neurological:      Mental Status: She is alert and oriented to person, place, and time. Motor: No weakness. Coordination: Coordination normal.      Gait: Gait normal.   Psychiatric:         Mood and Affect: Mood normal.         Behavior: Behavior normal.         Thought Content: Thought content normal.         Judgment: Judgment normal.     BP (!) 84/57 (Site: Left Upper Arm, Position: Sitting, Cuff Size: Large Adult)   Pulse (!) 110   Temp 99.5 °F (37.5 °C) (Tympanic)   Ht 5' 1\" (1.549 m)   Wt 190 lb (86.2 kg)   BMI 35.90 kg/m²     Assessment:       Diagnosis Orders   1. Influenza A  oseltamivir (TAMIFLU) 30 MG capsule      2. Flu-like symptoms  POCT Influenza A/B        Results for POC orders placed in visit on 12/12/22   POCT Influenza A/B   Result Value Ref Range    Influenza A Ab pos     Influenza B Ab neg        Plan:   -Influenza A positive. Pt instructed:  -Rest, increase fluids. Wash your hands and cover your mouth and nose with coughing or sneezing.  -Tamiflu Rx, reduced dose due to kidney function  -Increase fluids for low BP and dehydration   -You may takeTylenol/Motrin as needed for fever or body aches as directed on the package.  -You may take over the counter flu/cold preparations if desired as directed on the bottle.   -If you have questions or concerns, please call or return to the urgent care or follow up with your PCP. -Follow up in 1 week if symptoms do not improve/worsen. -Go to the ER for any shortness of breath, chest pain or any other emergent concern. Return if symptoms worsen or fail to improve. Orders Placed This Encounter   Medications    oseltamivir (TAMIFLU) 30 MG capsule     Sig: Take 1 capsule by mouth 2 times daily for 5 days     Dispense:  10 capsule     Refill:  0           Patient given educational materials - see patient instructions. Discussed use, benefit, and side effects of prescribed medications. All patient questions answered. Pt voiced understanding.     Electronically signed by KARON Maxwell NP on 12/12/2022 at 3:13 PM

## 2023-01-05 NOTE — TELEPHONE ENCOUNTER
REACHED OUT TO PT SEVERAL TIMES BY PHONE & MAIL. PT DID NOT CALL BACK TO SCHEDULE APPOINTMENT. REFERRAL WAS SENT BACK TO OFFICE.

## 2023-05-11 ENCOUNTER — OFFICE VISIT (OUTPATIENT)
Dept: PRIMARY CARE CLINIC | Age: 38
End: 2023-05-11
Payer: COMMERCIAL

## 2023-05-11 VITALS
DIASTOLIC BLOOD PRESSURE: 67 MMHG | SYSTOLIC BLOOD PRESSURE: 102 MMHG | HEART RATE: 103 BPM | OXYGEN SATURATION: 97 % | WEIGHT: 187 LBS | HEIGHT: 61 IN | BODY MASS INDEX: 35.3 KG/M2

## 2023-05-11 DIAGNOSIS — G56.01 CARPAL TUNNEL SYNDROME OF RIGHT WRIST: ICD-10-CM

## 2023-05-11 DIAGNOSIS — R53.83 FATIGUE, UNSPECIFIED TYPE: ICD-10-CM

## 2023-05-11 DIAGNOSIS — E10.22 TYPE 1 DIABETES MELLITUS WITH STAGE 3A CHRONIC KIDNEY DISEASE (HCC): ICD-10-CM

## 2023-05-11 DIAGNOSIS — J30.2 SEASONAL ALLERGIES: Primary | ICD-10-CM

## 2023-05-11 DIAGNOSIS — N18.31 TYPE 1 DIABETES MELLITUS WITH STAGE 3A CHRONIC KIDNEY DISEASE (HCC): ICD-10-CM

## 2023-05-11 PROCEDURE — 99213 OFFICE O/P EST LOW 20 MIN: CPT | Performed by: NURSE PRACTITIONER

## 2023-05-11 PROCEDURE — 3046F HEMOGLOBIN A1C LEVEL >9.0%: CPT | Performed by: NURSE PRACTITIONER

## 2023-05-11 PROCEDURE — 1036F TOBACCO NON-USER: CPT | Performed by: NURSE PRACTITIONER

## 2023-05-11 PROCEDURE — G8427 DOCREV CUR MEDS BY ELIG CLIN: HCPCS | Performed by: NURSE PRACTITIONER

## 2023-05-11 PROCEDURE — G8417 CALC BMI ABV UP PARAM F/U: HCPCS | Performed by: NURSE PRACTITIONER

## 2023-05-11 PROCEDURE — 2022F DILAT RTA XM EVC RTNOPTHY: CPT | Performed by: NURSE PRACTITIONER

## 2023-05-11 RX ORDER — CETIRIZINE HYDROCHLORIDE 10 MG/1
10 TABLET ORAL DAILY
Qty: 90 TABLET | Refills: 1 | Status: SHIPPED | OUTPATIENT
Start: 2023-05-11 | End: 2023-08-09

## 2023-05-11 RX ORDER — FLUTICASONE PROPIONATE 50 MCG
2 SPRAY, SUSPENSION (ML) NASAL DAILY
Qty: 16 G | Refills: 0 | Status: SHIPPED | OUTPATIENT
Start: 2023-05-11

## 2023-05-11 ASSESSMENT — PATIENT HEALTH QUESTIONNAIRE - PHQ9
SUM OF ALL RESPONSES TO PHQ9 QUESTIONS 1 & 2: 0
1. LITTLE INTEREST OR PLEASURE IN DOING THINGS: 0
SUM OF ALL RESPONSES TO PHQ QUESTIONS 1-9: 0
2. FEELING DOWN, DEPRESSED OR HOPELESS: 0
SUM OF ALL RESPONSES TO PHQ QUESTIONS 1-9: 0

## 2023-05-11 NOTE — PROGRESS NOTES
Luc Todd (:  1985) is a 44 y.o. female,Established patient, here for evaluation of the following chief complaint(s):  Fatigue (Pt has been very tired and feeling fatigue X 2 weeks more so than normal and pt has been having episodes when her right hand will fall asleep and tingle )         ASSESSMENT/PLAN:  1. Seasonal allergies  -     cetirizine (ZYRTEC) 10 MG tablet; Take 1 tablet by mouth daily, Disp-90 tablet, R-1Normal  -     fluticasone (FLONASE) 50 MCG/ACT nasal spray; 2 sprays by Each Nostril route daily, Disp-16 g, R-0Normal  2. Carpal tunnel syndrome of right wrist  -     DME Order for (Specify) as OP  3. Fatigue, unspecified type  -     TSH With Reflex Ft4; Future  4. Type 1 diabetes mellitus with stage 3a chronic kidney disease (HCC)  -     Hemoglobin A1C; Future  -     Comprehensive Metabolic Panel; Future      Return in about 3 months (around 2023). Subjective   SUBJECTIVE/OBJECTIVE:  HPI    Patient presents for same-day appointment with 2 weeks of postnasal drip, dry/nonproductive cough and allergic rhinitis. Denies any fevers, myalgias/arthralgias, chest pain/dyspnea or systemic symptoms concerning for acute bacterial infection. Denies any use of over-the-counter remedies to date. Patient is also complaining of intermittent right hand numbness and tingling. Symptoms are elicited when holding her phone and pulling heavy objects while at work. She denies any acute neck pain or shoulder pain and no acute injury. Patient endorses feeling like Sergio Junior has to shake out her hand\". Symptoms also occur while she is sleeping and are worse at night. No weakness noted on physical exam and neuro exam is focally intact. Additionally, has not scheduled with a local endocrinologist for continued management of her type 1 diabetes. Patient states she has an appointment scheduled with her endocrinologist in [place] for 1 \"last visit\".   Encouraged the patient to establish with

## 2023-05-12 ENCOUNTER — TELEPHONE (OUTPATIENT)
Dept: PRIMARY CARE CLINIC | Age: 38
End: 2023-05-12

## 2023-05-12 ASSESSMENT — ENCOUNTER SYMPTOMS
SINUS PAIN: 0
COLOR CHANGE: 0
PHOTOPHOBIA: 0
BACK PAIN: 0
SINUS PRESSURE: 0
CHEST TIGHTNESS: 0
NAUSEA: 0
SORE THROAT: 0
ABDOMINAL PAIN: 0
DIARRHEA: 0
SHORTNESS OF BREATH: 0
VOMITING: 0
COUGH: 0
RHINORRHEA: 1

## 2023-06-04 DIAGNOSIS — J30.2 SEASONAL ALLERGIES: ICD-10-CM

## 2023-06-05 RX ORDER — FLUTICASONE PROPIONATE 50 MCG
SPRAY, SUSPENSION (ML) NASAL
Qty: 16 G | Refills: 2 | Status: SHIPPED | OUTPATIENT
Start: 2023-06-05

## 2023-08-14 ENCOUNTER — HOSPITAL ENCOUNTER (OUTPATIENT)
Age: 38
Discharge: HOME | End: 2023-08-14
Payer: COMMERCIAL

## 2023-08-14 DIAGNOSIS — R53.83 FATIGUE, UNSPECIFIED TYPE: ICD-10-CM

## 2023-08-14 DIAGNOSIS — E10.22 TYPE 1 DIABETES MELLITUS WITH STAGE 3A CHRONIC KIDNEY DISEASE (HCC): ICD-10-CM

## 2023-08-14 DIAGNOSIS — N18.31 TYPE 1 DIABETES MELLITUS WITH STAGE 3A CHRONIC KIDNEY DISEASE (HCC): ICD-10-CM

## 2023-08-14 PROCEDURE — 36415 COLL VENOUS BLD VENIPUNCTURE: CPT

## 2023-08-14 PROCEDURE — 80053 COMPREHEN METABOLIC PANEL: CPT

## 2023-08-14 PROCEDURE — 84443 ASSAY THYROID STIM HORMONE: CPT

## 2023-08-14 PROCEDURE — 83036 HEMOGLOBIN GLYCOSYLATED A1C: CPT

## 2023-08-15 ENCOUNTER — TELEPHONE (OUTPATIENT)
Dept: PRIMARY CARE CLINIC | Age: 38
End: 2023-08-15

## 2023-08-15 ENCOUNTER — HOSPITAL ENCOUNTER (EMERGENCY)
Age: 38
Discharge: HOME | End: 2023-08-15
Attending: EMERGENCY MEDICINE
Payer: COMMERCIAL

## 2023-08-15 VITALS
TEMPERATURE: 98.2 F | HEART RATE: 96 BPM | WEIGHT: 180 LBS | SYSTOLIC BLOOD PRESSURE: 178 MMHG | OXYGEN SATURATION: 100 % | RESPIRATION RATE: 14 BRPM | BODY MASS INDEX: 33.99 KG/M2 | HEIGHT: 61 IN | DIASTOLIC BLOOD PRESSURE: 101 MMHG

## 2023-08-15 DIAGNOSIS — E87.5 HYPERKALEMIA: Primary | ICD-10-CM

## 2023-08-15 DIAGNOSIS — N18.4 STAGE 4 CHRONIC KIDNEY DISEASE (HCC): Primary | ICD-10-CM

## 2023-08-15 DIAGNOSIS — N18.32 STAGE 3B CHRONIC KIDNEY DISEASE (HCC): Primary | ICD-10-CM

## 2023-08-15 LAB
ALBUMIN SERPL-MCNC: 3.8 G/DL (ref 3.5–5.2)
ALBUMIN/GLOB SERPL: 1 {RATIO} (ref 1–2.5)
ALP SERPL-CCNC: 90 U/L (ref 35–104)
ALT SERPL-CCNC: 11 U/L (ref 5–33)
ANION GAP SERPL CALCULATED.3IONS-SCNC: 11 MMOL/L (ref 9–17)
ANION GAP SERPL CALCULATED.3IONS-SCNC: 9 MMOL/L (ref 9–17)
AST SERPL-CCNC: 17 U/L
BACTERIA URNS QL MICRO: NORMAL
BASOPHILS # BLD: 0 K/UL (ref 0–0.2)
BASOPHILS NFR BLD: 1 % (ref 0–2)
BILIRUB SERPL-MCNC: 0.2 MG/DL (ref 0.3–1.2)
BILIRUB UR QL STRIP: NEGATIVE
BUN SERPL-MCNC: 40 MG/DL (ref 6–20)
BUN SERPL-MCNC: 49 MG/DL (ref 6–20)
CALCIUM SERPL-MCNC: 8.7 MG/DL (ref 8.6–10.4)
CALCIUM SERPL-MCNC: 8.7 MG/DL (ref 8.6–10.4)
CASTS #/AREA URNS LPF: NORMAL /LPF
CHLORIDE SERPL-SCNC: 107 MMOL/L (ref 98–107)
CHLORIDE SERPL-SCNC: 110 MMOL/L (ref 98–107)
CK SERPL-CCNC: 92 U/L (ref 26–192)
CLARITY UR: CLEAR
CO2 SERPL-SCNC: 20 MMOL/L (ref 20–31)
CO2 SERPL-SCNC: 21 MMOL/L (ref 20–31)
COLOR UR: YELLOW
CREAT SERPL-MCNC: 2.2 MG/DL (ref 0.5–0.9)
CREAT SERPL-MCNC: 2.2 MG/DL (ref 0.5–0.9)
EOSINOPHIL # BLD: 0.2 K/UL (ref 0–0.4)
EOSINOPHILS RELATIVE PERCENT: 2 % (ref 0–4)
EPI CELLS #/AREA URNS HPF: NORMAL /HPF
ERYTHROCYTE [DISTWIDTH] IN BLOOD BY AUTOMATED COUNT: 13.4 % (ref 11.5–14.9)
EST. AVERAGE GLUCOSE BLD GHB EST-MCNC: 151 MG/DL
GFR SERPL CREATININE-BSD FRML MDRD: 29 ML/MIN/1.73M2
GFR SERPL CREATININE-BSD FRML MDRD: 29 ML/MIN/1.73M2
GLUCOSE SERPL-MCNC: 134 MG/DL (ref 70–99)
GLUCOSE SERPL-MCNC: 161 MG/DL (ref 70–99)
GLUCOSE UR STRIP-MCNC: NEGATIVE MG/DL
HBA1C MFR BLD: 6.9 % (ref 4–6)
HCT VFR BLD AUTO: 30.9 % (ref 36–46)
HGB BLD-MCNC: 10.3 G/DL (ref 12–16)
HGB UR QL STRIP.AUTO: ABNORMAL
KETONES UR STRIP-MCNC: NEGATIVE MG/DL
LEUKOCYTE ESTERASE UR QL STRIP: NEGATIVE
LIPASE SERPL-CCNC: 106 U/L (ref 13–60)
LYMPHOCYTES NFR BLD: 2.5 K/UL (ref 1–4.8)
LYMPHOCYTES RELATIVE PERCENT: 37 % (ref 24–44)
MCH RBC QN AUTO: 28.3 PG (ref 26–34)
MCHC RBC AUTO-ENTMCNC: 33.3 G/DL (ref 31–37)
MCV RBC AUTO: 84.7 FL (ref 80–100)
MONOCYTES NFR BLD: 0.3 K/UL (ref 0.1–1.3)
MONOCYTES NFR BLD: 4 % (ref 1–7)
NEUTROPHILS NFR BLD: 56 % (ref 36–66)
NEUTS SEG NFR BLD: 3.7 K/UL (ref 1.3–9.1)
NITRITE UR QL STRIP: NEGATIVE
PH UR STRIP: 5 [PH] (ref 5–8)
PLATELET # BLD AUTO: 306 K/UL (ref 150–450)
PMV BLD AUTO: 7.1 FL (ref 6–12)
POTASSIUM SERPL-SCNC: 4.6 MMOL/L (ref 3.7–5.3)
POTASSIUM SERPL-SCNC: 5.8 MMOL/L (ref 3.7–5.3)
PROT SERPL-MCNC: 7.5 G/DL (ref 6.4–8.3)
PROT UR STRIP-MCNC: ABNORMAL MG/DL
RBC # BLD AUTO: 3.65 M/UL (ref 4–5.2)
RBC #/AREA URNS HPF: NORMAL /HPF
SODIUM SERPL-SCNC: 138 MMOL/L (ref 135–144)
SODIUM SERPL-SCNC: 140 MMOL/L (ref 135–144)
SP GR UR STRIP: 1.01 (ref 1–1.03)
TSH SERPL DL<=0.05 MIU/L-ACNC: 1.53 UIU/ML (ref 0.3–5)
UROBILINOGEN UR STRIP-ACNC: NORMAL EU/DL (ref 0–1)
WBC #/AREA URNS HPF: NORMAL /HPF
WBC OTHER # BLD: 6.6 K/UL (ref 3.5–11)

## 2023-08-15 PROCEDURE — 85025 COMPLETE CBC W/AUTO DIFF WBC: CPT

## 2023-08-15 PROCEDURE — 6370000000 HC RX 637 (ALT 250 FOR IP): Performed by: EMERGENCY MEDICINE

## 2023-08-15 PROCEDURE — 82550 ASSAY OF CK (CPK): CPT

## 2023-08-15 PROCEDURE — 2580000003 HC RX 258: Performed by: EMERGENCY MEDICINE

## 2023-08-15 PROCEDURE — 93005 ELECTROCARDIOGRAM TRACING: CPT | Performed by: EMERGENCY MEDICINE

## 2023-08-15 PROCEDURE — 36415 COLL VENOUS BLD VENIPUNCTURE: CPT

## 2023-08-15 PROCEDURE — 99284 EMERGENCY DEPT VISIT MOD MDM: CPT

## 2023-08-15 PROCEDURE — 83690 ASSAY OF LIPASE: CPT

## 2023-08-15 PROCEDURE — 81001 URINALYSIS AUTO W/SCOPE: CPT

## 2023-08-15 PROCEDURE — 80048 BASIC METABOLIC PNL TOTAL CA: CPT

## 2023-08-15 RX ORDER — MAGNESIUM HYDROXIDE/ALUMINUM HYDROXICE/SIMETHICONE 120; 1200; 1200 MG/30ML; MG/30ML; MG/30ML
15 SUSPENSION ORAL ONCE
Status: COMPLETED | OUTPATIENT
Start: 2023-08-15 | End: 2023-08-15

## 2023-08-15 RX ORDER — 0.9 % SODIUM CHLORIDE 0.9 %
500 INTRAVENOUS SOLUTION INTRAVENOUS ONCE
Status: COMPLETED | OUTPATIENT
Start: 2023-08-15 | End: 2023-08-15

## 2023-08-15 RX ADMIN — SODIUM CHLORIDE 500 ML: 9 INJECTION, SOLUTION INTRAVENOUS at 15:32

## 2023-08-15 RX ADMIN — ALUMINUM HYDROXIDE, MAGNESIUM HYDROXIDE, AND SIMETHICONE 15 ML: 200; 200; 20 SUSPENSION ORAL at 17:44

## 2023-08-15 ASSESSMENT — PAIN DESCRIPTION - LOCATION
LOCATION: BACK
LOCATION: BACK

## 2023-08-15 ASSESSMENT — PAIN - FUNCTIONAL ASSESSMENT
PAIN_FUNCTIONAL_ASSESSMENT: 0-10
PAIN_FUNCTIONAL_ASSESSMENT: 0-10

## 2023-08-15 ASSESSMENT — PAIN DESCRIPTION - DESCRIPTORS: DESCRIPTORS: DULL;THROBBING

## 2023-08-15 ASSESSMENT — PAIN SCALES - GENERAL
PAINLEVEL_OUTOF10: 6
PAINLEVEL_OUTOF10: 6

## 2023-08-15 ASSESSMENT — PAIN DESCRIPTION - PAIN TYPE: TYPE: ACUTE PAIN

## 2023-08-15 ASSESSMENT — PAIN DESCRIPTION - ORIENTATION: ORIENTATION: RIGHT;LEFT;LOWER

## 2023-08-15 ASSESSMENT — LIFESTYLE VARIABLES
HOW OFTEN DO YOU HAVE A DRINK CONTAINING ALCOHOL: NEVER
HOW MANY STANDARD DRINKS CONTAINING ALCOHOL DO YOU HAVE ON A TYPICAL DAY: PATIENT DOES NOT DRINK

## 2023-08-15 NOTE — TELEPHONE ENCOUNTER
Please call patient and assess how she is doing. Her kidney function has significantly worsened since our last visit, her potassium is elevated and labs appear as though she is dehydrated. Is she currently ill or has she been ill very recently? Trying to determine if she needs to go to the ER for IV fluids/potassium correction.

## 2023-08-15 NOTE — TELEPHONE ENCOUNTER
Let patient know she is at risk of developing an acute kidney injury. I would like her to schedule an appointment to see me either in person or for a virtual visit if possible on Friday. I have sent medication called Layne Rodríguez over to her pharmacy on file. She needs to take this medication today to lower her potassium levels. I have also pended a BMP to repeat the patient's kidney function at the end of the week. Patient needs to push oral fluids to ensure adequate hydration. Additionally, if patient is still taking her losartan (on this previously for blood pressure control) she needs to stop taking it. I also need the patient to call nephrology and schedule a follow up with their office.

## 2023-08-15 NOTE — TELEPHONE ENCOUNTER
She is very tired, sugar is very high , she does feel dehydrated , and the tiredness is more so an exhaustion with sluggish feeling

## 2023-08-15 NOTE — ED TRIAGE NOTES
Mode of arrival (squad #, walk in, police, etc) : walk-in        Chief complaint(s): abnormal lab        Arrival Note (brief scenario, treatment PTA, etc). : patient had labs done yesterday and was told to come in to the ER for abnormal kidney labs. C= \"Have you ever felt that you should Cut down on your drinking? \"  No  A= \"Have people Annoyed you by criticizing your drinking? \"  No  G= \"Have you ever felt bad or Guilty about your drinking? \"  No  E= \"Have you ever had a drink as an Eye-opener first thing in the morning to steady your nerves or to help a hangover? \"  No      Deferred []      Reason for deferring: N/A    *If yes to two or more: probable alcohol abuse. *

## 2023-08-16 ENCOUNTER — OFFICE VISIT (OUTPATIENT)
Dept: PRIMARY CARE CLINIC | Age: 38
End: 2023-08-16
Payer: COMMERCIAL

## 2023-08-16 VITALS
OXYGEN SATURATION: 98 % | WEIGHT: 189.4 LBS | DIASTOLIC BLOOD PRESSURE: 97 MMHG | HEIGHT: 61 IN | BODY MASS INDEX: 35.76 KG/M2 | SYSTOLIC BLOOD PRESSURE: 146 MMHG | HEART RATE: 97 BPM

## 2023-08-16 DIAGNOSIS — D50.8 OTHER IRON DEFICIENCY ANEMIA: ICD-10-CM

## 2023-08-16 DIAGNOSIS — I10 PRIMARY HYPERTENSION: ICD-10-CM

## 2023-08-16 DIAGNOSIS — N18.4 CKD (CHRONIC KIDNEY DISEASE) STAGE 4, GFR 15-29 ML/MIN (HCC): Primary | ICD-10-CM

## 2023-08-16 DIAGNOSIS — E87.5 HYPERKALEMIA: ICD-10-CM

## 2023-08-16 PROBLEM — G89.29 CHRONIC MIDLINE LOW BACK PAIN WITHOUT SCIATICA: Status: ACTIVE | Noted: 2019-01-28

## 2023-08-16 PROBLEM — N93.9 ABNORMAL UTERINE BLEEDING: Status: ACTIVE | Noted: 2019-02-01

## 2023-08-16 PROBLEM — N18.9 ANEMIA OF CHRONIC RENAL FAILURE: Status: ACTIVE | Noted: 2017-10-04

## 2023-08-16 PROBLEM — N94.10 FEMALE DYSPAREUNIA: Status: ACTIVE | Noted: 2018-09-01

## 2023-08-16 PROBLEM — M54.2 NECK PAIN: Status: ACTIVE | Noted: 2019-01-28

## 2023-08-16 PROBLEM — M54.50 CHRONIC MIDLINE LOW BACK PAIN WITHOUT SCIATICA: Status: ACTIVE | Noted: 2019-01-28

## 2023-08-16 PROBLEM — E78.00 HIGH CHOLESTEROL: Status: ACTIVE | Noted: 2019-08-02

## 2023-08-16 PROBLEM — N91.1 AMENORRHEA, SECONDARY: Status: ACTIVE | Noted: 2018-12-17

## 2023-08-16 PROBLEM — J45.909 ASTHMA: Status: ACTIVE | Noted: 2019-09-26

## 2023-08-16 PROBLEM — D63.1 ANEMIA OF CHRONIC RENAL FAILURE: Status: ACTIVE | Noted: 2017-10-04

## 2023-08-16 PROBLEM — J44.9 COPD (CHRONIC OBSTRUCTIVE PULMONARY DISEASE) WITH CHRONIC BRONCHITIS (HCC): Status: ACTIVE | Noted: 2019-09-26

## 2023-08-16 PROBLEM — J44.89 COPD (CHRONIC OBSTRUCTIVE PULMONARY DISEASE) WITH CHRONIC BRONCHITIS: Status: ACTIVE | Noted: 2019-09-26

## 2023-08-16 PROBLEM — E10.42 DIABETIC POLYNEUROPATHY ASSOCIATED WITH TYPE 1 DIABETES MELLITUS (HCC): Status: ACTIVE | Noted: 2018-02-13

## 2023-08-16 PROBLEM — R06.02 SOB (SHORTNESS OF BREATH): Status: ACTIVE | Noted: 2023-08-16

## 2023-08-16 PROBLEM — F41.1 GAD (GENERALIZED ANXIETY DISORDER): Status: ACTIVE | Noted: 2021-02-04

## 2023-08-16 PROBLEM — E66.9 OBESITY (BMI 30-39.9): Status: ACTIVE | Noted: 2019-08-02

## 2023-08-16 PROBLEM — N92.0 MENORRHAGIA: Status: ACTIVE | Noted: 2019-08-19

## 2023-08-16 PROBLEM — D23.9 DERMATOFIBROMA: Status: ACTIVE | Noted: 2017-10-25

## 2023-08-16 PROBLEM — E87.70 HYPERVOLEMIA: Status: ACTIVE | Noted: 2019-09-25

## 2023-08-16 LAB
EKG ATRIAL RATE: 93 BPM
EKG P AXIS: 53 DEGREES
EKG P-R INTERVAL: 138 MS
EKG Q-T INTERVAL: 342 MS
EKG QRS DURATION: 76 MS
EKG QTC CALCULATION (BAZETT): 425 MS
EKG R AXIS: 29 DEGREES
EKG T AXIS: 46 DEGREES
EKG VENTRICULAR RATE: 93 BPM

## 2023-08-16 PROCEDURE — 99214 OFFICE O/P EST MOD 30 MIN: CPT | Performed by: NURSE PRACTITIONER

## 2023-08-16 PROCEDURE — 3080F DIAST BP >= 90 MM HG: CPT | Performed by: NURSE PRACTITIONER

## 2023-08-16 PROCEDURE — 93010 ELECTROCARDIOGRAM REPORT: CPT | Performed by: INTERNAL MEDICINE

## 2023-08-16 PROCEDURE — G8417 CALC BMI ABV UP PARAM F/U: HCPCS | Performed by: NURSE PRACTITIONER

## 2023-08-16 PROCEDURE — G8427 DOCREV CUR MEDS BY ELIG CLIN: HCPCS | Performed by: NURSE PRACTITIONER

## 2023-08-16 PROCEDURE — 1036F TOBACCO NON-USER: CPT | Performed by: NURSE PRACTITIONER

## 2023-08-16 PROCEDURE — 3077F SYST BP >= 140 MM HG: CPT | Performed by: NURSE PRACTITIONER

## 2023-08-16 RX ORDER — AMLODIPINE BESYLATE 5 MG/1
5 TABLET ORAL DAILY
Qty: 30 TABLET | Refills: 0 | Status: SHIPPED | OUTPATIENT
Start: 2023-08-16

## 2023-08-16 RX ORDER — INSULIN ASPART 100 [IU]/ML
INJECTION, SOLUTION INTRAVENOUS; SUBCUTANEOUS
COMMUNITY
Start: 2023-05-13 | End: 2023-08-16

## 2023-08-16 SDOH — ECONOMIC STABILITY: FOOD INSECURITY: WITHIN THE PAST 12 MONTHS, YOU WORRIED THAT YOUR FOOD WOULD RUN OUT BEFORE YOU GOT MONEY TO BUY MORE.: NEVER TRUE

## 2023-08-16 SDOH — ECONOMIC STABILITY: FOOD INSECURITY: WITHIN THE PAST 12 MONTHS, THE FOOD YOU BOUGHT JUST DIDN'T LAST AND YOU DIDN'T HAVE MONEY TO GET MORE.: NEVER TRUE

## 2023-08-16 SDOH — ECONOMIC STABILITY: HOUSING INSECURITY
IN THE LAST 12 MONTHS, WAS THERE A TIME WHEN YOU DID NOT HAVE A STEADY PLACE TO SLEEP OR SLEPT IN A SHELTER (INCLUDING NOW)?: NO

## 2023-08-16 SDOH — ECONOMIC STABILITY: INCOME INSECURITY: HOW HARD IS IT FOR YOU TO PAY FOR THE VERY BASICS LIKE FOOD, HOUSING, MEDICAL CARE, AND HEATING?: NOT HARD AT ALL

## 2023-08-16 ASSESSMENT — ENCOUNTER SYMPTOMS
DIARRHEA: 0
SHORTNESS OF BREATH: 0
SINUS PRESSURE: 0
COUGH: 0
BACK PAIN: 0
SORE THROAT: 0
PHOTOPHOBIA: 0
CHEST TIGHTNESS: 0
ABDOMINAL PAIN: 0
SINUS PAIN: 0
VOMITING: 0
NAUSEA: 0
COLOR CHANGE: 0

## 2023-08-16 NOTE — PROGRESS NOTES
Tata Chatman (:  1985) is a 44 y.o. female,Established patient, here for evaluation of the following chief complaint(s):  Follow-up and Results (Discuss results)         ASSESSMENT/PLAN:  1. CKD (chronic kidney disease) stage 4, GFR 15-29 ml/min ([facility])  2. Hyperkalemia  -     Basic Metabolic Panel; Future  3. Primary hypertension  -     amLODIPine (NORVASC) 5 MG tablet; Take 1 tablet by mouth daily, Disp-30 tablet, R-0Normal  4. Other iron deficiency anemia  -     Ferritin; Future  -     Iron and TIBC; Future      Return in about 3 months (around 2023). Subjective   SUBJECTIVE/OBJECTIVE:  HPI    Patient returns today after being sent to the ER yesterday with concerns for dehydration, fatigue, hyperglycemia and a significant change in her kidney function and hyperkalemia noted on BMP from yesterday. Repeat labs while patient was in the ER reflective of worsening kidney function, however, potassium levels were within normal range. Unfortunately, patient has been fairly noncompliant since establishing care. She has been given multiple referrals to a local endocrinologist and continues to seek urgent care from her previous endocrinologist from [facility]. Today, we had a long discussion regarding the importance of establishing care given her underlying history of thyroid dysfunction and type 1 diabetes. Patient's A1c noted to be 6.9, hence, she is in range for a type I diabetic. He is currently on insulin pump and being managed by her endocrinologist in [place]. Thyroid function within normal range on yesterday's check. Additionally, patient failed to schedule a posthospital follow-up with nephrology when she was hospitalized in 2023. Patient has been given 2 referrals to nephrologist in the [facility] area. We discussed the importance of follow-up given her worsening kidney function. Baseline BUN and creatinine are elevating and GFR is most recently at 29.   There

## 2023-09-05 PROBLEM — N18.4 CKD (CHRONIC KIDNEY DISEASE), STAGE IV (HCC): Status: ACTIVE | Noted: 2023-09-05

## 2023-09-10 DIAGNOSIS — I10 PRIMARY HYPERTENSION: ICD-10-CM

## 2023-09-11 RX ORDER — AMLODIPINE BESYLATE 5 MG/1
5 TABLET ORAL DAILY
Qty: 90 TABLET | Refills: 1 | Status: SHIPPED | OUTPATIENT
Start: 2023-09-11 | End: 2023-10-10

## 2023-10-09 ENCOUNTER — HOSPITAL ENCOUNTER (OUTPATIENT)
Age: 38
Discharge: HOME | End: 2023-10-09
Payer: COMMERCIAL

## 2023-10-09 DIAGNOSIS — N18.4 CKD (CHRONIC KIDNEY DISEASE) STAGE 4, GFR 15-29 ML/MIN (HCC): ICD-10-CM

## 2023-10-09 DIAGNOSIS — N18.4 ANEMIA IN STAGE 4 CHRONIC KIDNEY DISEASE (HCC): ICD-10-CM

## 2023-10-09 DIAGNOSIS — N18.4 SECONDARY DIABETES MELLITUS WITH STAGE 4 CHRONIC KIDNEY DISEASE (HCC): ICD-10-CM

## 2023-10-09 DIAGNOSIS — E13.22 SECONDARY DIABETES MELLITUS WITH STAGE 4 CHRONIC KIDNEY DISEASE (HCC): ICD-10-CM

## 2023-10-09 DIAGNOSIS — N18.4 BENIGN HYPERTENSION WITH CKD (CHRONIC KIDNEY DISEASE) STAGE IV (HCC): ICD-10-CM

## 2023-10-09 DIAGNOSIS — I12.9 BENIGN HYPERTENSION WITH CKD (CHRONIC KIDNEY DISEASE) STAGE IV (HCC): ICD-10-CM

## 2023-10-09 DIAGNOSIS — Z13.21 ENCOUNTER FOR VITAMIN DEFICIENCY SCREENING: ICD-10-CM

## 2023-10-09 DIAGNOSIS — R80.9 PROTEINURIA, UNSPECIFIED TYPE: ICD-10-CM

## 2023-10-09 DIAGNOSIS — D63.1 ANEMIA IN STAGE 4 CHRONIC KIDNEY DISEASE (HCC): ICD-10-CM

## 2023-10-09 LAB
25(OH)D3 SERPL-MCNC: 11 NG/ML (ref 30–100)
ANION GAP SERPL CALCULATED.3IONS-SCNC: 7 MMOL/L (ref 9–17)
BACTERIA URNS QL MICRO: ABNORMAL
BILIRUB UR QL STRIP: NEGATIVE
BUN SERPL-MCNC: 47 MG/DL (ref 6–20)
C3 SERPL-MCNC: 100 MG/DL (ref 90–180)
C4 SERPL-MCNC: 27 MG/DL (ref 10–40)
CALCIUM SERPL-MCNC: 8.8 MG/DL (ref 8.6–10.4)
CASTS #/AREA URNS LPF: ABNORMAL /LPF
CHLORIDE SERPL-SCNC: 109 MMOL/L (ref 98–107)
CLARITY UR: ABNORMAL
CO2 SERPL-SCNC: 25 MMOL/L (ref 20–31)
COLOR UR: YELLOW
CREAT SERPL-MCNC: 2.6 MG/DL (ref 0.5–0.9)
CREAT UR-MCNC: 42.2 MG/DL (ref 28–217)
EPI CELLS #/AREA URNS HPF: ABNORMAL /HPF
GFR SERPL CREATININE-BSD FRML MDRD: 23 ML/MIN/1.73M2
GLUCOSE UR STRIP-MCNC: NEGATIVE MG/DL
HCT VFR BLD AUTO: 29.3 % (ref 36–46)
HGB BLD-MCNC: 9.8 G/DL (ref 12–16)
HGB UR QL STRIP.AUTO: NEGATIVE
KETONES UR STRIP-MCNC: NEGATIVE MG/DL
LEUKOCYTE ESTERASE UR QL STRIP: NEGATIVE
MAGNESIUM SERPL-MCNC: 2.3 MG/DL (ref 1.6–2.6)
MICROALBUMIN UR-MCNC: 1661 MG/L
MICROALBUMIN/CREAT UR-RTO: 3936 MCG/MG CREAT
NITRITE UR QL STRIP: NEGATIVE
PH UR STRIP: 5.5 [PH] (ref 5–8)
PHOSPHATE SERPL-MCNC: 3.8 MG/DL (ref 2.6–4.5)
POTASSIUM SERPL-SCNC: 4.8 MMOL/L (ref 3.7–5.3)
PROT UR STRIP-MCNC: ABNORMAL MG/DL
RBC #/AREA URNS HPF: ABNORMAL /HPF
SODIUM SERPL-SCNC: 141 MMOL/L (ref 135–144)
SP GR UR STRIP: 1.01 (ref 1–1.03)
UROBILINOGEN UR STRIP-ACNC: NORMAL EU/DL (ref 0–1)
WBC #/AREA URNS HPF: ABNORMAL /HPF

## 2023-10-09 PROCEDURE — 80051 ELECTROLYTE PANEL: CPT

## 2023-10-09 PROCEDURE — 84100 ASSAY OF PHOSPHORUS: CPT

## 2023-10-09 PROCEDURE — 82310 ASSAY OF CALCIUM: CPT

## 2023-10-09 PROCEDURE — 84520 ASSAY OF UREA NITROGEN: CPT

## 2023-10-09 PROCEDURE — 81001 URINALYSIS AUTO W/SCOPE: CPT

## 2023-10-09 PROCEDURE — 86160 COMPLEMENT ANTIGEN: CPT

## 2023-10-09 PROCEDURE — 82570 ASSAY OF URINE CREATININE: CPT

## 2023-10-09 PROCEDURE — 85014 HEMATOCRIT: CPT

## 2023-10-09 PROCEDURE — 86038 ANTINUCLEAR ANTIBODIES: CPT

## 2023-10-09 PROCEDURE — 82043 UR ALBUMIN QUANTITATIVE: CPT

## 2023-10-09 PROCEDURE — 83735 ASSAY OF MAGNESIUM: CPT

## 2023-10-09 PROCEDURE — 82565 ASSAY OF CREATININE: CPT

## 2023-10-09 PROCEDURE — 86225 DNA ANTIBODY NATIVE: CPT

## 2023-10-09 PROCEDURE — 82306 VITAMIN D 25 HYDROXY: CPT

## 2023-10-09 PROCEDURE — 36415 COLL VENOUS BLD VENIPUNCTURE: CPT

## 2023-10-09 PROCEDURE — 85018 HEMOGLOBIN: CPT

## 2023-10-09 PROCEDURE — 87086 URINE CULTURE/COLONY COUNT: CPT

## 2023-10-10 ENCOUNTER — HOSPITAL ENCOUNTER (OUTPATIENT)
Age: 38
Setting detail: SPECIMEN
Discharge: HOME | End: 2023-10-10
Payer: COMMERCIAL

## 2023-10-10 DIAGNOSIS — N18.4 SECONDARY DIABETES MELLITUS WITH STAGE 4 CHRONIC KIDNEY DISEASE (HCC): ICD-10-CM

## 2023-10-10 DIAGNOSIS — N18.4 CKD (CHRONIC KIDNEY DISEASE) STAGE 4, GFR 15-29 ML/MIN (HCC): ICD-10-CM

## 2023-10-10 DIAGNOSIS — N18.4 ANEMIA IN STAGE 4 CHRONIC KIDNEY DISEASE (HCC): ICD-10-CM

## 2023-10-10 DIAGNOSIS — R80.9 PROTEINURIA, UNSPECIFIED TYPE: ICD-10-CM

## 2023-10-10 DIAGNOSIS — N18.4 BENIGN HYPERTENSION WITH CKD (CHRONIC KIDNEY DISEASE) STAGE IV (HCC): ICD-10-CM

## 2023-10-10 DIAGNOSIS — I12.9 BENIGN HYPERTENSION WITH CKD (CHRONIC KIDNEY DISEASE) STAGE IV (HCC): ICD-10-CM

## 2023-10-10 DIAGNOSIS — E13.22 SECONDARY DIABETES MELLITUS WITH STAGE 4 CHRONIC KIDNEY DISEASE (HCC): ICD-10-CM

## 2023-10-10 DIAGNOSIS — D63.1 ANEMIA IN STAGE 4 CHRONIC KIDNEY DISEASE (HCC): ICD-10-CM

## 2023-10-10 LAB
COLLECT DURATION TIME SPEC: 24 H
COLLECT DURATION TIME UR: 24 H
CREAT SERPL-MCNC: 2.6 MG/DL (ref 0.5–0.9)
CREAT UR-MCNC: 46.3 MG/DL (ref 28–217)
CREATINE 24H UR-MRATE: 903 MG/24 H (ref 740–1570)
CREATININE CLEARANCE: 23.1 ML/MIN/BSA (ref 71–151)
HOURS COLLECTED: 24 H
MICROORGANISM SPEC CULT: NO GROWTH
PATIENT HEIGHT: 61 IN
PROTEIN 24 HOUR URINE: 4212 MG/24 H
SODIUM 24 HOUR URINE: 137 MMOL/24 H (ref 40–220)
SODIUM URINE: 70 MMOL/L
SPECIMEN DESCRIPTION: NORMAL
SPECIMEN VOL 24H UR: 1950 ML
SPECIMEN VOL UR: 1950 ML
URINE TOTAL PROTEIN: 216 MG/DL
VOLUME: 1950 ML

## 2023-10-10 PROCEDURE — 36415 COLL VENOUS BLD VENIPUNCTURE: CPT

## 2023-10-10 PROCEDURE — 84156 ASSAY OF PROTEIN URINE: CPT

## 2023-10-10 PROCEDURE — 82575 CREATININE CLEARANCE TEST: CPT

## 2023-10-10 PROCEDURE — 84300 ASSAY OF URINE SODIUM: CPT

## 2023-10-13 LAB
ANA SER QL IA: NEGATIVE
DSDNA IGG SER QL IA: 1.2 IU/ML
NUCLEAR IGG SER IA-RTO: 0.3 U/ML

## 2023-11-03 ENCOUNTER — TELEPHONE (OUTPATIENT)
Dept: GASTROENTEROLOGY | Age: 38
End: 2023-11-03

## 2023-11-03 NOTE — TELEPHONE ENCOUNTER
Writer called and spoke with pt directly. Per patient she hasn't had a recent procedure/nor been in the hospital for anything GI related. Closing out message.       Cornelius Sizer

## 2023-11-26 ENCOUNTER — HOSPITAL ENCOUNTER (INPATIENT)
Age: 38
LOS: 1 days | Discharge: HOME | DRG: 638 | End: 2023-11-27
Attending: EMERGENCY MEDICINE
Payer: MEDICARE

## 2023-11-26 ENCOUNTER — APPOINTMENT (OUTPATIENT)
Dept: GENERAL RADIOLOGY | Age: 38
DRG: 638 | End: 2023-11-26
Payer: MEDICARE

## 2023-11-26 DIAGNOSIS — E10.10 DKA, TYPE 1, NOT AT GOAL (HCC): ICD-10-CM

## 2023-11-26 DIAGNOSIS — E10.10 TYPE 1 DIABETES MELLITUS WITH KETOACIDOSIS WITHOUT COMA (HCC): Primary | ICD-10-CM

## 2023-11-26 LAB
ALBUMIN SERPL-MCNC: 3.2 G/DL (ref 3.5–5.2)
ALP SERPL-CCNC: 83 U/L (ref 35–104)
ALT SERPL-CCNC: 9 U/L (ref 5–33)
ANION GAP SERPL CALCULATED.3IONS-SCNC: 10 MMOL/L (ref 9–17)
ANION GAP SERPL CALCULATED.3IONS-SCNC: 10 MMOL/L (ref 9–17)
ANION GAP SERPL CALCULATED.3IONS-SCNC: 16 MMOL/L (ref 9–17)
ANION GAP SERPL CALCULATED.3IONS-SCNC: 7 MMOL/L (ref 9–17)
AST SERPL-CCNC: 16 U/L
B-OH-BUTYR SERPL-MCNC: 2.8 MMOL/L (ref 0.02–0.27)
BACTERIA URNS QL MICRO: ABNORMAL
BASOPHILS # BLD: 0 K/UL (ref 0–0.2)
BASOPHILS NFR BLD: 1 % (ref 0–2)
BILIRUB SERPL-MCNC: 0.5 MG/DL (ref 0.3–1.2)
BILIRUB UR QL STRIP: NEGATIVE
BODY TEMPERATURE: 37
BUN SERPL-MCNC: 35 MG/DL (ref 6–20)
BUN SERPL-MCNC: 36 MG/DL (ref 6–20)
BUN SERPL-MCNC: 37 MG/DL (ref 6–20)
BUN SERPL-MCNC: 38 MG/DL (ref 6–20)
CALCIUM SERPL-MCNC: 7.2 MG/DL (ref 8.6–10.4)
CALCIUM SERPL-MCNC: 7.3 MG/DL (ref 8.6–10.4)
CALCIUM SERPL-MCNC: 7.4 MG/DL (ref 8.6–10.4)
CALCIUM SERPL-MCNC: 8 MG/DL (ref 8.6–10.4)
CASTS #/AREA URNS LPF: ABNORMAL /LPF
CASTS #/AREA URNS LPF: ABNORMAL /LPF
CHLORIDE SERPL-SCNC: 105 MMOL/L (ref 98–107)
CHLORIDE SERPL-SCNC: 106 MMOL/L (ref 98–107)
CHLORIDE SERPL-SCNC: 106 MMOL/L (ref 98–107)
CHLORIDE SERPL-SCNC: 93 MMOL/L (ref 98–107)
CLARITY UR: ABNORMAL
CO2 SERPL-SCNC: 17 MMOL/L (ref 20–31)
CO2 SERPL-SCNC: 18 MMOL/L (ref 20–31)
CO2 SERPL-SCNC: 18 MMOL/L (ref 20–31)
CO2 SERPL-SCNC: 20 MMOL/L (ref 20–31)
COHGB MFR BLD: 2.8 % (ref 0–5)
COLOR UR: YELLOW
CREAT SERPL-MCNC: 2.6 MG/DL (ref 0.5–0.9)
CREAT SERPL-MCNC: 2.6 MG/DL (ref 0.5–0.9)
CREAT SERPL-MCNC: 2.7 MG/DL (ref 0.5–0.9)
CREAT SERPL-MCNC: 2.7 MG/DL (ref 0.5–0.9)
EOSINOPHIL # BLD: 0 K/UL (ref 0–0.4)
EOSINOPHILS RELATIVE PERCENT: 0 % (ref 0–4)
EPI CELLS #/AREA URNS HPF: ABNORMAL /HPF
ERYTHROCYTE [DISTWIDTH] IN BLOOD BY AUTOMATED COUNT: 14.3 % (ref 11.5–14.9)
EST. AVERAGE GLUCOSE BLD GHB EST-MCNC: 137 MG/DL
GAS FLOW.O2 O2 DELIVERY SYS: ABNORMAL L/MIN
GFR SERPL CREATININE-BSD FRML MDRD: 22 ML/MIN/1.73M2
GFR SERPL CREATININE-BSD FRML MDRD: 22 ML/MIN/1.73M2
GFR SERPL CREATININE-BSD FRML MDRD: 23 ML/MIN/1.73M2
GFR SERPL CREATININE-BSD FRML MDRD: 23 ML/MIN/1.73M2
GLUCOSE BLD-MCNC: 145 MG/DL (ref 65–105)
GLUCOSE BLD-MCNC: 162 MG/DL (ref 65–105)
GLUCOSE BLD-MCNC: 164 MG/DL (ref 65–105)
GLUCOSE BLD-MCNC: 171 MG/DL (ref 65–105)
GLUCOSE BLD-MCNC: 219 MG/DL (ref 65–105)
GLUCOSE BLD-MCNC: 263 MG/DL (ref 65–105)
GLUCOSE BLD-MCNC: 332 MG/DL (ref 65–105)
GLUCOSE BLD-MCNC: 410 MG/DL (ref 65–105)
GLUCOSE SERPL-MCNC: 221 MG/DL (ref 70–99)
GLUCOSE SERPL-MCNC: 223 MG/DL (ref 70–99)
GLUCOSE SERPL-MCNC: 227 MG/DL (ref 70–99)
GLUCOSE SERPL-MCNC: 618 MG/DL (ref 70–99)
GLUCOSE UR STRIP-MCNC: ABNORMAL MG/DL
HBA1C MFR BLD: 6.4 % (ref 4–6)
HCG SERPL QL: NEGATIVE
HCO3 VENOUS: 17.8 MMOL/L (ref 24–30)
HCT VFR BLD AUTO: 31.1 % (ref 36–46)
HGB BLD-MCNC: 10 G/DL (ref 12–16)
HGB UR QL STRIP.AUTO: ABNORMAL
INR PPP: 0.9
KETONES UR STRIP-MCNC: ABNORMAL MG/DL
LEUKOCYTE ESTERASE UR QL STRIP: NEGATIVE
LIPASE SERPL-CCNC: 37 U/L (ref 13–60)
LYMPHOCYTES NFR BLD: 1.1 K/UL (ref 1–4.8)
LYMPHOCYTES RELATIVE PERCENT: 10 % (ref 24–44)
MAGNESIUM SERPL-MCNC: 1.9 MG/DL (ref 1.6–2.6)
MAGNESIUM SERPL-MCNC: 2 MG/DL (ref 1.6–2.6)
MAGNESIUM SERPL-MCNC: 2 MG/DL (ref 1.6–2.6)
MCH RBC QN AUTO: 28.6 PG (ref 26–34)
MCHC RBC AUTO-ENTMCNC: 32.2 G/DL (ref 31–37)
MCV RBC AUTO: 88.8 FL (ref 80–100)
METHEMOGLOBIN: 1.1 % (ref 0–1.9)
MONOCYTES NFR BLD: 0.3 K/UL (ref 0.1–1.3)
MONOCYTES NFR BLD: 3 % (ref 1–7)
NEGATIVE BASE EXCESS, VEN: 9.1 MMOL/L (ref 0–2)
NEUTROPHILS NFR BLD: 86 % (ref 36–66)
NEUTS SEG NFR BLD: 9.4 K/UL (ref 1.3–9.1)
NITRITE UR QL STRIP: NEGATIVE
O2 SAT, VEN: 83.3 % (ref 60–85)
PCO2, VEN: 39 MM HG (ref 39–55)
PH UR STRIP: 5.5 [PH] (ref 5–8)
PH VENOUS: 7.27 (ref 7.32–7.42)
PHOSPHATE SERPL-MCNC: 3 MG/DL (ref 2.6–4.5)
PHOSPHATE SERPL-MCNC: 3.4 MG/DL (ref 2.6–4.5)
PLATELET # BLD AUTO: 346 K/UL (ref 150–450)
PMV BLD AUTO: 7.7 FL (ref 6–12)
PO2, VEN: 52.3 MM HG (ref 30–50)
POTASSIUM SERPL-SCNC: 4.3 MMOL/L (ref 3.7–5.3)
POTASSIUM SERPL-SCNC: 5.4 MMOL/L (ref 3.7–5.3)
POTASSIUM SERPL-SCNC: 5.4 MMOL/L (ref 3.7–5.3)
POTASSIUM SERPL-SCNC: 5.5 MMOL/L (ref 3.7–5.3)
PROT SERPL-MCNC: 6.8 G/DL (ref 6.4–8.3)
PROT UR STRIP-MCNC: ABNORMAL MG/DL
PROTHROMBIN TIME: 12.9 SEC (ref 11.8–14.6)
RBC # BLD AUTO: 3.51 M/UL (ref 4–5.2)
RBC #/AREA URNS HPF: ABNORMAL /HPF
SODIUM SERPL-SCNC: 127 MMOL/L (ref 135–144)
SODIUM SERPL-SCNC: 133 MMOL/L (ref 135–144)
SP GR UR STRIP: 1.02 (ref 1–1.03)
TROPONIN I SERPL HS-MCNC: 12 NG/L (ref 0–14)
TROPONIN I SERPL HS-MCNC: 13 NG/L (ref 0–14)
UROBILINOGEN UR STRIP-ACNC: NORMAL EU/DL (ref 0–1)
WBC #/AREA URNS HPF: ABNORMAL /HPF
WBC OTHER # BLD: 10.8 K/UL (ref 3.5–11)

## 2023-11-26 PROCEDURE — 6360000002 HC RX W HCPCS

## 2023-11-26 PROCEDURE — 6370000000 HC RX 637 (ALT 250 FOR IP): Performed by: NURSE PRACTITIONER

## 2023-11-26 PROCEDURE — 82805 BLOOD GASES W/O2 SATURATION: CPT

## 2023-11-26 PROCEDURE — 80053 COMPREHEN METABOLIC PANEL: CPT

## 2023-11-26 PROCEDURE — 2500000003 HC RX 250 WO HCPCS

## 2023-11-26 PROCEDURE — 82010 KETONE BODYS QUAN: CPT

## 2023-11-26 PROCEDURE — 85610 PROTHROMBIN TIME: CPT

## 2023-11-26 PROCEDURE — 96360 HYDRATION IV INFUSION INIT: CPT

## 2023-11-26 PROCEDURE — 80048 BASIC METABOLIC PNL TOTAL CA: CPT

## 2023-11-26 PROCEDURE — 71045 X-RAY EXAM CHEST 1 VIEW: CPT

## 2023-11-26 PROCEDURE — 81001 URINALYSIS AUTO W/SCOPE: CPT

## 2023-11-26 PROCEDURE — 6360000002 HC RX W HCPCS: Performed by: INTERNAL MEDICINE

## 2023-11-26 PROCEDURE — 36415 COLL VENOUS BLD VENIPUNCTURE: CPT

## 2023-11-26 PROCEDURE — 6370000000 HC RX 637 (ALT 250 FOR IP)

## 2023-11-26 PROCEDURE — 2580000003 HC RX 258

## 2023-11-26 PROCEDURE — 6370000000 HC RX 637 (ALT 250 FOR IP): Performed by: EMERGENCY MEDICINE

## 2023-11-26 PROCEDURE — 93005 ELECTROCARDIOGRAM TRACING: CPT | Performed by: EMERGENCY MEDICINE

## 2023-11-26 PROCEDURE — 82947 ASSAY GLUCOSE BLOOD QUANT: CPT

## 2023-11-26 PROCEDURE — 84484 ASSAY OF TROPONIN QUANT: CPT

## 2023-11-26 PROCEDURE — 6360000002 HC RX W HCPCS: Performed by: EMERGENCY MEDICINE

## 2023-11-26 PROCEDURE — 99285 EMERGENCY DEPT VISIT HI MDM: CPT

## 2023-11-26 PROCEDURE — 99291 CRITICAL CARE FIRST HOUR: CPT | Performed by: INTERNAL MEDICINE

## 2023-11-26 PROCEDURE — 83690 ASSAY OF LIPASE: CPT

## 2023-11-26 PROCEDURE — 85025 COMPLETE CBC W/AUTO DIFF WBC: CPT

## 2023-11-26 PROCEDURE — 84100 ASSAY OF PHOSPHORUS: CPT

## 2023-11-26 PROCEDURE — 2000000000 HC ICU R&B

## 2023-11-26 PROCEDURE — 83735 ASSAY OF MAGNESIUM: CPT

## 2023-11-26 PROCEDURE — 83036 HEMOGLOBIN GLYCOSYLATED A1C: CPT

## 2023-11-26 PROCEDURE — 84703 CHORIONIC GONADOTROPIN ASSAY: CPT

## 2023-11-26 PROCEDURE — 2580000003 HC RX 258: Performed by: EMERGENCY MEDICINE

## 2023-11-26 RX ORDER — DEXTROSE MONOHYDRATE 100 MG/ML
INJECTION, SOLUTION INTRAVENOUS CONTINUOUS PRN
Status: DISCONTINUED | OUTPATIENT
Start: 2023-11-26 | End: 2023-11-26 | Stop reason: SDUPTHER

## 2023-11-26 RX ORDER — INSULIN LISPRO 100 [IU]/ML
0-8 INJECTION, SOLUTION INTRAVENOUS; SUBCUTANEOUS EVERY 4 HOURS
Status: DISCONTINUED | OUTPATIENT
Start: 2023-11-26 | End: 2023-11-27

## 2023-11-26 RX ORDER — ACETAMINOPHEN 500 MG
1000 TABLET ORAL ONCE
Status: DISCONTINUED | OUTPATIENT
Start: 2023-11-26 | End: 2023-11-27 | Stop reason: HOSPADM

## 2023-11-26 RX ORDER — HYDRALAZINE HYDROCHLORIDE 20 MG/ML
10 INJECTION INTRAMUSCULAR; INTRAVENOUS EVERY 6 HOURS PRN
Status: DISCONTINUED | OUTPATIENT
Start: 2023-11-26 | End: 2023-11-27 | Stop reason: HOSPADM

## 2023-11-26 RX ORDER — DEXTROSE MONOHYDRATE 100 MG/ML
INJECTION, SOLUTION INTRAVENOUS CONTINUOUS PRN
Status: DISCONTINUED | OUTPATIENT
Start: 2023-11-26 | End: 2023-11-27 | Stop reason: HOSPADM

## 2023-11-26 RX ORDER — MAGNESIUM SULFATE HEPTAHYDRATE 40 MG/ML
2000 INJECTION, SOLUTION INTRAVENOUS PRN
Status: DISCONTINUED | OUTPATIENT
Start: 2023-11-26 | End: 2023-11-26

## 2023-11-26 RX ORDER — FENTANYL CITRATE 0.05 MG/ML
25 INJECTION, SOLUTION INTRAMUSCULAR; INTRAVENOUS ONCE
Status: COMPLETED | OUTPATIENT
Start: 2023-11-26 | End: 2023-11-26

## 2023-11-26 RX ORDER — 0.9 % SODIUM CHLORIDE 0.9 %
1000 INTRAVENOUS SOLUTION INTRAVENOUS ONCE
Status: COMPLETED | OUTPATIENT
Start: 2023-11-26 | End: 2023-11-26

## 2023-11-26 RX ORDER — SODIUM CHLORIDE 9 MG/ML
INJECTION, SOLUTION INTRAVENOUS CONTINUOUS
Status: DISCONTINUED | OUTPATIENT
Start: 2023-11-26 | End: 2023-11-27 | Stop reason: HOSPADM

## 2023-11-26 RX ORDER — POLYETHYLENE GLYCOL 3350 17 G/17G
17 POWDER, FOR SOLUTION ORAL DAILY PRN
Status: DISCONTINUED | OUTPATIENT
Start: 2023-11-26 | End: 2023-11-27 | Stop reason: HOSPADM

## 2023-11-26 RX ORDER — HEPARIN SODIUM 5000 [USP'U]/ML
5000 INJECTION, SOLUTION INTRAVENOUS; SUBCUTANEOUS EVERY 8 HOURS SCHEDULED
Status: DISCONTINUED | OUTPATIENT
Start: 2023-11-26 | End: 2023-11-27 | Stop reason: HOSPADM

## 2023-11-26 RX ORDER — ONDANSETRON 2 MG/ML
4 INJECTION INTRAMUSCULAR; INTRAVENOUS ONCE
Status: COMPLETED | OUTPATIENT
Start: 2023-11-26 | End: 2023-11-26

## 2023-11-26 RX ORDER — LIDOCAINE 4 G/G
1 PATCH TOPICAL DAILY
Status: DISCONTINUED | OUTPATIENT
Start: 2023-11-26 | End: 2023-11-27 | Stop reason: HOSPADM

## 2023-11-26 RX ORDER — DEXTROSE MONOHYDRATE, SODIUM CHLORIDE, AND POTASSIUM CHLORIDE 50; 1.49; 4.5 G/1000ML; G/1000ML; G/1000ML
INJECTION, SOLUTION INTRAVENOUS CONTINUOUS PRN
Status: DISCONTINUED | OUTPATIENT
Start: 2023-11-26 | End: 2023-11-27 | Stop reason: HOSPADM

## 2023-11-26 RX ORDER — FENTANYL CITRATE 0.05 MG/ML
25 INJECTION, SOLUTION INTRAMUSCULAR; INTRAVENOUS EVERY 6 HOURS PRN
Status: DISCONTINUED | OUTPATIENT
Start: 2023-11-26 | End: 2023-11-27 | Stop reason: HOSPADM

## 2023-11-26 RX ORDER — METOPROLOL TARTRATE 1 MG/ML
5 INJECTION, SOLUTION INTRAVENOUS EVERY 6 HOURS PRN
Status: DISCONTINUED | OUTPATIENT
Start: 2023-11-26 | End: 2023-11-27 | Stop reason: HOSPADM

## 2023-11-26 RX ORDER — ONDANSETRON 2 MG/ML
4 INJECTION INTRAMUSCULAR; INTRAVENOUS EVERY 6 HOURS PRN
Status: DISCONTINUED | OUTPATIENT
Start: 2023-11-26 | End: 2023-11-27 | Stop reason: HOSPADM

## 2023-11-26 RX ORDER — POTASSIUM CHLORIDE 7.45 MG/ML
10 INJECTION INTRAVENOUS PRN
Status: DISCONTINUED | OUTPATIENT
Start: 2023-11-26 | End: 2023-11-27 | Stop reason: ALTCHOICE

## 2023-11-26 RX ADMIN — FENTANYL CITRATE 25 MCG: 0.05 INJECTION, SOLUTION INTRAMUSCULAR; INTRAVENOUS at 17:37

## 2023-11-26 RX ADMIN — POTASSIUM CHLORIDE 10 MEQ: 7.46 INJECTION, SOLUTION INTRAVENOUS at 16:54

## 2023-11-26 RX ADMIN — SODIUM CHLORIDE 10.82 UNITS/HR: 9 INJECTION, SOLUTION INTRAVENOUS at 12:27

## 2023-11-26 RX ADMIN — POTASSIUM CHLORIDE 10 MEQ: 7.46 INJECTION, SOLUTION INTRAVENOUS at 18:27

## 2023-11-26 RX ADMIN — SODIUM CHLORIDE 1.36 UNITS/HR: 9 INJECTION, SOLUTION INTRAVENOUS at 14:37

## 2023-11-26 RX ADMIN — SODIUM CHLORIDE: 9 INJECTION, SOLUTION INTRAVENOUS at 13:35

## 2023-11-26 RX ADMIN — SODIUM CHLORIDE 1000 ML: 9 INJECTION, SOLUTION INTRAVENOUS at 10:52

## 2023-11-26 RX ADMIN — INSULIN LISPRO 2 UNITS: 100 INJECTION, SOLUTION INTRAVENOUS; SUBCUTANEOUS at 22:20

## 2023-11-26 RX ADMIN — DEXTROSE, SODIUM CHLORIDE, AND POTASSIUM CHLORIDE: 5; .45; .15 INJECTION INTRAVENOUS at 16:48

## 2023-11-26 RX ADMIN — ONDANSETRON 4 MG: 2 INJECTION INTRAMUSCULAR; INTRAVENOUS at 19:01

## 2023-11-26 RX ADMIN — FENTANYL CITRATE 25 MCG: 0.05 INJECTION, SOLUTION INTRAMUSCULAR; INTRAVENOUS at 23:43

## 2023-11-26 RX ADMIN — ONDANSETRON 4 MG: 2 INJECTION INTRAMUSCULAR; INTRAVENOUS at 12:43

## 2023-11-26 ASSESSMENT — PAIN DESCRIPTION - LOCATION
LOCATION: FLANK
LOCATION: FLANK;GENERALIZED
LOCATION: FLANK

## 2023-11-26 ASSESSMENT — PAIN SCALES - GENERAL
PAINLEVEL_OUTOF10: 7
PAINLEVEL_OUTOF10: 6
PAINLEVEL_OUTOF10: 6
PAINLEVEL_OUTOF10: 5

## 2023-11-26 ASSESSMENT — ENCOUNTER SYMPTOMS
SHORTNESS OF BREATH: 0
CHEST TIGHTNESS: 0
COLOR CHANGE: 0
VOMITING: 1
ABDOMINAL DISTENTION: 0
NAUSEA: 1
CHOKING: 0
COUGH: 0
BACK PAIN: 1
ABDOMINAL PAIN: 1

## 2023-11-26 ASSESSMENT — PAIN DESCRIPTION - DESCRIPTORS: DESCRIPTORS: STABBING

## 2023-11-26 ASSESSMENT — PAIN - FUNCTIONAL ASSESSMENT: PAIN_FUNCTIONAL_ASSESSMENT: NONE - DENIES PAIN

## 2023-11-26 NOTE — H&P
[address] [place] [address]     HISTORY AND PHYSICAL EXAMINATION            Date:   11/26/2023  Patient name:  Onofre Deras  Date of admission:  11/26/2023 10:27 AM  MRN:   [id number]  Account:  [de-identified]  YOB: 1985  PCP:    KARON Nixon CNP  Room:   2016/2016-01  Code Status:    Full Code    Chief Complaint:     Chief Complaint   Patient presents with    Blood Sugar Problem     Pump reads above 400mg/dl    Shortness of Breath    Nausea       History Obtained From:     patient    History of Present Illness:     43years old female with past medical history of type 1 diabetes mellitus, CKD stage IV presented to ED with nausea and fatigue. As per the patient, her insulin pump site infiltrated at work and she thought that she was not getting her insulin. She denies any chest pain but admits to having mild abdominal pain and back pain. At the ED, her sugar was 618 and serum beta hydroxybutyrate was positive. Her anion gap was 16 and venous blood gas pH is 7.26. She is being admitted with the diagnosis of diabetic ketoacidosis and she is currently n.p.o. and receiving regular insulin with normal saline. Admitted to ICU. 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 ERAZO - 12/2022

## 2023-11-26 NOTE — ED TRIAGE NOTES
Mode of arrival (squad #, walk in, police, etc) : walk in        Chief complaint(s): high blood sugar, shortness of breath, nausea        Arrival Note (brief scenario, treatment PTA, etc). : Patient coming from home c/o high blood sugar (pump reads 400mg/dl), nausea, shortness of breath, bilateral flank pain. Patient stated she has DM Type 1 and on insulin pump, pump is working in the last few days not until today. C= \"Have you ever felt that you should Cut down on your drinking? \"  No  A= \"Have people Annoyed you by criticizing your drinking? \"  No  G= \"Have you ever felt bad or Guilty about your drinking? \"  No  E= \"Have you ever had a drink as an Eye-opener first thing in the morning to steady your nerves or to help a hangover? \"  No      Deferred []      Reason for deferring: N/A    *If yes to two or more: probable alcohol abuse. *

## 2023-11-27 VITALS
OXYGEN SATURATION: 98 % | WEIGHT: 180 LBS | DIASTOLIC BLOOD PRESSURE: 92 MMHG | HEART RATE: 95 BPM | TEMPERATURE: 98.1 F | HEIGHT: 61 IN | SYSTOLIC BLOOD PRESSURE: 168 MMHG | BODY MASS INDEX: 33.99 KG/M2 | RESPIRATION RATE: 17 BRPM

## 2023-11-27 LAB
ANION GAP SERPL CALCULATED.3IONS-SCNC: 11 MMOL/L (ref 9–17)
ANION GAP SERPL CALCULATED.3IONS-SCNC: 9 MMOL/L (ref 9–17)
BASOPHILS # BLD: 0 K/UL (ref 0–0.2)
BASOPHILS NFR BLD: 1 % (ref 0–2)
BUN SERPL-MCNC: 32 MG/DL (ref 6–20)
BUN SERPL-MCNC: 35 MG/DL (ref 6–20)
CALCIUM SERPL-MCNC: 7.4 MG/DL (ref 8.6–10.4)
CALCIUM SERPL-MCNC: 7.6 MG/DL (ref 8.6–10.4)
CHLORIDE SERPL-SCNC: 105 MMOL/L (ref 98–107)
CHLORIDE SERPL-SCNC: 108 MMOL/L (ref 98–107)
CO2 SERPL-SCNC: 17 MMOL/L (ref 20–31)
CO2 SERPL-SCNC: 18 MMOL/L (ref 20–31)
CREAT SERPL-MCNC: 2.5 MG/DL (ref 0.5–0.9)
CREAT SERPL-MCNC: 2.6 MG/DL (ref 0.5–0.9)
EKG ATRIAL RATE: 102 BPM
EKG P AXIS: 54 DEGREES
EKG P-R INTERVAL: 136 MS
EKG Q-T INTERVAL: 344 MS
EKG QRS DURATION: 74 MS
EKG QTC CALCULATION (BAZETT): 448 MS
EKG R AXIS: 20 DEGREES
EKG T AXIS: 41 DEGREES
EKG VENTRICULAR RATE: 102 BPM
EOSINOPHIL # BLD: 0.1 K/UL (ref 0–0.4)
EOSINOPHILS RELATIVE PERCENT: 2 % (ref 0–4)
ERYTHROCYTE [DISTWIDTH] IN BLOOD BY AUTOMATED COUNT: 13.9 % (ref 11.5–14.9)
GFR SERPL CREATININE-BSD FRML MDRD: 23 ML/MIN/1.73M2
GFR SERPL CREATININE-BSD FRML MDRD: 24 ML/MIN/1.73M2
GLUCOSE BLD-MCNC: 147 MG/DL (ref 65–105)
GLUCOSE BLD-MCNC: 179 MG/DL (ref 65–105)
GLUCOSE BLD-MCNC: 214 MG/DL (ref 65–105)
GLUCOSE BLD-MCNC: 247 MG/DL (ref 65–105)
GLUCOSE BLD-MCNC: 283 MG/DL (ref 65–105)
GLUCOSE BLD-MCNC: 65 MG/DL (ref 65–105)
GLUCOSE BLD-MCNC: 65 MG/DL (ref 65–105)
GLUCOSE BLD-MCNC: 89 MG/DL (ref 65–105)
GLUCOSE SERPL-MCNC: 236 MG/DL (ref 70–99)
GLUCOSE SERPL-MCNC: 266 MG/DL (ref 70–99)
HCT VFR BLD AUTO: 25.6 % (ref 36–46)
HGB BLD-MCNC: 8.8 G/DL (ref 12–16)
LYMPHOCYTES NFR BLD: 2.6 K/UL (ref 1–4.8)
LYMPHOCYTES RELATIVE PERCENT: 35 % (ref 24–44)
MAGNESIUM SERPL-MCNC: 2 MG/DL (ref 1.6–2.6)
MAGNESIUM SERPL-MCNC: 2 MG/DL (ref 1.6–2.6)
MCH RBC QN AUTO: 30 PG (ref 26–34)
MCHC RBC AUTO-ENTMCNC: 34.4 G/DL (ref 31–37)
MCV RBC AUTO: 87.1 FL (ref 80–100)
MONOCYTES NFR BLD: 0.4 K/UL (ref 0.1–1.3)
MONOCYTES NFR BLD: 5 % (ref 1–7)
NEUTROPHILS NFR BLD: 57 % (ref 36–66)
NEUTS SEG NFR BLD: 4.3 K/UL (ref 1.3–9.1)
PHOSPHATE SERPL-MCNC: 2.9 MG/DL (ref 2.6–4.5)
PHOSPHATE SERPL-MCNC: 3 MG/DL (ref 2.6–4.5)
PLATELET # BLD AUTO: 287 K/UL (ref 150–450)
PMV BLD AUTO: 7.5 FL (ref 6–12)
POTASSIUM SERPL-SCNC: 5 MMOL/L (ref 3.7–5.3)
POTASSIUM SERPL-SCNC: 5.5 MMOL/L (ref 3.7–5.3)
RBC # BLD AUTO: 2.94 M/UL (ref 4–5.2)
SODIUM SERPL-SCNC: 133 MMOL/L (ref 135–144)
SODIUM SERPL-SCNC: 135 MMOL/L (ref 135–144)
WBC OTHER # BLD: 7.4 K/UL (ref 3.5–11)

## 2023-11-27 PROCEDURE — 6370000000 HC RX 637 (ALT 250 FOR IP)

## 2023-11-27 PROCEDURE — 93010 ELECTROCARDIOGRAM REPORT: CPT | Performed by: INTERNAL MEDICINE

## 2023-11-27 PROCEDURE — 6370000000 HC RX 637 (ALT 250 FOR IP): Performed by: NURSE PRACTITIONER

## 2023-11-27 PROCEDURE — 99239 HOSP IP/OBS DSCHRG MGMT >30: CPT | Performed by: INTERNAL MEDICINE

## 2023-11-27 PROCEDURE — 82947 ASSAY GLUCOSE BLOOD QUANT: CPT

## 2023-11-27 PROCEDURE — 80048 BASIC METABOLIC PNL TOTAL CA: CPT

## 2023-11-27 PROCEDURE — 85025 COMPLETE CBC W/AUTO DIFF WBC: CPT

## 2023-11-27 PROCEDURE — 83735 ASSAY OF MAGNESIUM: CPT

## 2023-11-27 PROCEDURE — 36415 COLL VENOUS BLD VENIPUNCTURE: CPT

## 2023-11-27 PROCEDURE — 6370000000 HC RX 637 (ALT 250 FOR IP): Performed by: INTERNAL MEDICINE

## 2023-11-27 PROCEDURE — 84100 ASSAY OF PHOSPHORUS: CPT

## 2023-11-27 PROCEDURE — 6360000002 HC RX W HCPCS: Performed by: INTERNAL MEDICINE

## 2023-11-27 RX ORDER — SODIUM BICARBONATE 650 MG/1
650 TABLET ORAL 2 TIMES DAILY
Status: DISCONTINUED | OUTPATIENT
Start: 2023-11-27 | End: 2023-11-27 | Stop reason: HOSPADM

## 2023-11-27 RX ORDER — INSULIN LISPRO 100 [IU]/ML
0-4 INJECTION, SOLUTION INTRAVENOUS; SUBCUTANEOUS EVERY 4 HOURS
Status: DISCONTINUED | OUTPATIENT
Start: 2023-11-27 | End: 2023-11-27 | Stop reason: HOSPADM

## 2023-11-27 RX ORDER — INSULIN LISPRO 100 [IU]/ML
10 INJECTION, SOLUTION INTRAVENOUS; SUBCUTANEOUS
Status: DISCONTINUED | OUTPATIENT
Start: 2023-11-27 | End: 2023-11-27

## 2023-11-27 RX ORDER — INSULIN GLARGINE 100 [IU]/ML
10 INJECTION, SOLUTION SUBCUTANEOUS NIGHTLY
Status: DISCONTINUED | OUTPATIENT
Start: 2023-11-27 | End: 2023-11-27 | Stop reason: HOSPADM

## 2023-11-27 RX ORDER — SODIUM CHLORIDE 9 MG/ML
INJECTION, SOLUTION INTRAVENOUS CONTINUOUS
Status: DISCONTINUED | OUTPATIENT
Start: 2023-11-27 | End: 2023-11-27 | Stop reason: HOSPADM

## 2023-11-27 RX ORDER — INSULIN GLARGINE 100 [IU]/ML
10 INJECTION, SOLUTION SUBCUTANEOUS NIGHTLY
Qty: 5 ADJUSTABLE DOSE PRE-FILLED PEN SYRINGE | Refills: 3 | Status: SHIPPED | OUTPATIENT
Start: 2023-11-27

## 2023-11-27 RX ADMIN — INSULIN LISPRO 1 UNITS: 100 INJECTION, SOLUTION INTRAVENOUS; SUBCUTANEOUS at 04:16

## 2023-11-27 RX ADMIN — SODIUM ZIRCONIUM CYCLOSILICATE 5 G: 5 POWDER, FOR SUSPENSION ORAL at 06:00

## 2023-11-27 RX ADMIN — INSULIN LISPRO 2 UNITS: 100 INJECTION, SOLUTION INTRAVENOUS; SUBCUTANEOUS at 10:03

## 2023-11-27 RX ADMIN — ONDANSETRON 4 MG: 2 INJECTION INTRAMUSCULAR; INTRAVENOUS at 04:16

## 2023-11-27 RX ADMIN — FENTANYL CITRATE 25 MCG: 0.05 INJECTION, SOLUTION INTRAMUSCULAR; INTRAVENOUS at 06:06

## 2023-11-27 ASSESSMENT — PAIN SCALES - GENERAL: PAINLEVEL_OUTOF10: 6

## 2023-11-27 NOTE — PROGRESS NOTES
Attending Physician Statement  I have discussed the care of [facility] and I have examined the patient myself and taken ROS and HPI, including pertinent history and exam findings, with the resident. I have reviewed the key elements of all parts of the encounter with the resident. I agree with the assessment, plan and orders as documented by the resident. cc 28 62-year-old lady with a type 1 diabetes with insulin pump on the left arm recent A1c was 9 a year ago at [place] she has not followed closely here with the PCP or endocrinologist patient came in with a nausea vomiting feeling unwell noted to have DKA with beta hydroxybutyrate positive elevated anion gap low bicarb secondary to infiltrated insulin pump site and lack of insulin patient aggressive fluid resuscitation insulin drip diabetic education diabetic diet admitted to ICU stepdown    Electronically signed by Dianne Gaston MD
Critical care follow-up. Patient currently off insulin drip. Doing well. Spoke to the resident physician    Patient being transferred out of ICU intermediate care    Please call if we can be of further assistance.   Thank you for allowing us help with the patient    Lisy Euceda MD
Discharge paperwork reviewed with the patient. All PIV removed. PCT wheeling patient down to her car at this time.
Discussed with resident at bedside, patient is waiting on her family member to bring her home novolog to insert into her pump. Examined patient's home basal dose log together and MD to come up with plan to dose patient insulin level and continue sliding scale at this time    Dr. Dima Mcguire at bedside as well to examine the patient, MD okay with transfer to PCU and signing off.
Dr Jesus Manley updated with patients elevated potassium new orders received.
Page out to Dr. Stacy Salinas for new consult
Patient admitted to ICU 2016. Attached to monitor, VS taken, patient oriented to room, call light within reach, side rails up x2.  Assessment to follow
Patient placed on tele box for cardiac monitoring at this time per her request- agreeable to blood pressures Q2-4hrs.
Resident MD at bedside, decision made to discharge the patient. Awaiting orders at this time.
Secure message sent to Dr. Rodolfo Neri regarding patients nausea and ongoing pain.  New orders received 25 mcg fentanyl q6 PRN and 4mg zofran q6 PRN
Went into patient's room to do her blood glucose, patient states she is not comfortable with the plan of care that was discussed with the residents at the bedside and that she would like to just leave at this time to go put her pump on. Residents paged and notified- they informed writer to inform the patient just to not take her losartan.
Writer ranjana up 10 units of insulin per order and brought it into the patient's room. Patient expressed concern about taking that much insulin as she can be very brittle and has issues over night with her sugar dropping. Patient states she only feels comfortable enough to use two units of insulin at this time. Spoke with resident MD- and she is okay with patient only taking 2 units at this time and they will discuss further during rounds with the attending. Clin lead spoke with attending MD Dr. Hunter Gann and patient is okay to be PCU status.
prophylaxis: Heparin Sq  GI prophylaxis: None  Diet: Regular diet  Disposition: Medical ICU     OT/PT/SW      Code Status:Full code     Consultations:   IP CONSULT TO HOSPITALIST  IP CONSULT TO PULMONOLOGY  IP CONSULT TO DIABETES EDUCATOR  IP CONSULT TO DIABETES EDUCATOR      Patient is admitted as inpatient status because of co-morbiditieslisted above, severity of signs and symptoms as outlined, requirement for current medical therapies and most importantly because of direct risk to patient if care not provided in a hospital setting. Yossi Wang MD  PGY1- Transitional Year Resident  11/26/2023  1:41 PM     This note is created with the assistance of the speech recognition program. While intending to generate a document that actually reflects the content of the visit, it can still have some errors including those of syntax and sound-a-like substitutions which may escape proof reading. Actual meaning can be extrapolated by contextual inference. Attending Physician Statement  I have discussed the care of [facility] and I have examined the patient myself and taken ROS and HPI, including pertinent history and exam findings, with the resident. I have reviewed the key elements of all parts of the encounter with the resident. I agree with the assessment, plan and orders as documented by the resident.   Patient currently asymptomatic  \\Anion gap has closed  Non-anion gap metabolic acidosis likely secondary to CKD RTA  We will start p.o. bicarb  Blood sugar has been getting better patient refused to take 10 units of Lantus patient worried about hypoglycemia, manages her own insulin pump, last HbA1c 6.9   Patient does not want to stay overnight,ll wants  to get discharged, states that she will manage her own insulin pump, risk of further going into DKA explained, will give sliding scale every 4, give 10 units of Lantus  Patient was told that he will be discharged on Lantus I in case her pump does not work  and

## 2023-11-27 NOTE — DISCHARGE INSTRUCTIONS
Do not take losartan for a week as you have hyperkalemia. Visit PCP regarding restarting losartan. Repeat BMP in 1 week. Taltz Counseling: I discussed with the patient the risks of ixekizumab including but not limited to immunosuppression, serious infections, worsening of inflammatory bowel disease and drug reactions.  The patient understands that monitoring is required including a PPD at baseline and must alert us or the primary physician if symptoms of infection or other concerning signs are noted. Skyrizi Counseling: I discussed with the patient the risks of risankizumab-rzaa including but not limited to immunosuppression, and serious infections.  The patient understands that monitoring is required including a PPD at baseline and must alert us or the primary physician if symptoms of infection or other concerning signs are noted. Infliximab Pregnancy And Lactation Text: This medication is Pregnancy Category B and is considered safe during pregnancy. It is unknown if this medication is excreted in breast milk. Terbinafine Counseling: Patient counseling regarding adverse effects of terbinafine including but not limited to headache, diarrhea, rash, upset stomach, liver function test abnormalities, itching, taste/smell disturbance, nausea, abdominal pain, and flatulence.  There is a rare possibility of liver failure that can occur when taking terbinafine.  The patient understands that a baseline LFT and kidney function test may be required. The patient verbalized understanding of the proper use and possible adverse effects of terbinafine.  All of the patient's questions and concerns were addressed. Picato Pregnancy And Lactation Text: This medication is Pregnancy Category C. It is unknown if this medication is excreted in breast milk. Fluconazole Pregnancy And Lactation Text: This medication is Pregnancy Category C and it isn't know if it is safe during pregnancy. It is also excreted in breast milk. Clofazimine Counseling:  I discussed with the patient the risks of clofazimine including but not limited to skin and eye pigmentation, liver damage, nausea/vomiting, gastrointestinal bleeding and allergy. Methotrexate Counseling:  Patient counseled regarding adverse effects of methotrexate including but not limited to nausea, vomiting, abnormalities in liver function tests. Patients may develop mouth sores, rash, diarrhea, and abnormalities in blood counts. The patient understands that monitoring is required including LFT's and blood counts.  There is a rare possibility of scarring of the liver and lung problems that can occur when taking methotrexate. Persistent nausea, loss of appetite, pale stools, dark urine, cough, and shortness of breath should be reported immediately. Patient advised to discontinue methotrexate treatment at least three months before attempting to become pregnant.  I discussed the need for folate supplements while taking methotrexate.  These supplements can decrease side effects during methotrexate treatment. The patient verbalized understanding of the proper use and possible adverse effects of methotrexate.  All of the patient's questions and concerns were addressed. Hydroxyzine Pregnancy And Lactation Text: This medication is not safe during pregnancy and should not be taken. It is also excreted in breast milk and breast feeding isn't recommended. Imiquimod Counseling:  I discussed with the patient the risks of imiquimod including but not limited to erythema, scaling, itching, weeping, crusting, and pain.  Patient understands that the inflammatory response to imiquimod is variable from person to person and was educated regarded proper titration schedule.  If flu-like symptoms develop, patient knows to discontinue the medication and contact us. Spironolactone Pregnancy And Lactation Text: This medication can cause feminization of the male fetus and should be avoided during pregnancy. The active metabolite is also found in breast milk. Glycopyrrolate Pregnancy And Lactation Text: This medication is Pregnancy Category B and is considered safe during pregnancy. It is unknown if it is excreted breast milk. Birth Control Pills Pregnancy And Lactation Text: This medication should be avoided if pregnant and for the first 30 days post-partum. Tazorac Pregnancy And Lactation Text: This medication is not safe during pregnancy. It is unknown if this medication is excreted in breast milk. Humira Counseling:  I discussed with the patient the risks of adalimumab including but not limited to myelosuppression, immunosuppression, autoimmune hepatitis, demyelinating diseases, lymphoma, and serious infections.  The patient understands that monitoring is required including a PPD at baseline and must alert us or the primary physician if symptoms of infection or other concerning signs are noted. Rituxan Pregnancy And Lactation Text: This medication is Pregnancy Category C and it isn't know if it is safe during pregnancy. It is unknown if this medication is excreted in breast milk but similar antibodies are known to be excreted. Eucrisa Pregnancy And Lactation Text: This medication has not been assigned a Pregnancy Risk Category but animal studies failed to show danger with the topical medication. It is unknown if the medication is excreted in breast milk. Erivedge Pregnancy And Lactation Text: This medication is Pregnancy Category X and is absolutely contraindicated during pregnancy. It is unknown if it is excreted in breast milk. Sski Pregnancy And Lactation Text: This medication is Pregnancy Category D and isn't considered safe during pregnancy. It is excreted in breast milk. Spironolactone Counseling: Patient advised regarding risks of diarrhea, abdominal pain, hyperkalemia, birth defects (for female patients), liver toxicity and renal toxicity. The patient may need blood work to monitor liver and kidney function and potassium levels while on therapy. The patient verbalized understanding of the proper use and possible adverse effects of spironolactone.  All of the patient's questions and concerns were addressed. Bactrim Counseling:  I discussed with the patient the risks of sulfa antibiotics including but not limited to GI upset, allergic reaction, drug rash, diarrhea, dizziness, photosensitivity, and yeast infections.  Rarely, more serious reactions can occur including but not limited to aplastic anemia, agranulocytosis, methemoglobinemia, blood dyscrasias, liver or kidney failure, lung infiltrates or desquamative/blistering drug rashes. Valtrex Counseling: I discussed with the patient the risks of valacyclovir including but not limited to kidney damage, nausea, vomiting and severe allergy.  The patient understands that if the infection seems to be worsening or is not improving, they are to call. Siliq Counseling:  I discussed with the patient the risks of Siliq including but not limited to new or worsening depression, suicidal thoughts and behavior, immunosuppression, malignancy, posterior leukoencephalopathy syndrome, and serious infections.  The patient understands that monitoring is required including a PPD at baseline and must alert us or the primary physician if symptoms of infection or other concerning signs are noted. There is also a special program designed to monitor depression which is required with Siliq. Tremfya Counseling: I discussed with the patient the risks of guselkumab including but not limited to immunosuppression, serious infections, and drug reactions.  The patient understands that monitoring is required including a PPD at baseline and must alert us or the primary physician if symptoms of infection or other concerning signs are noted. Tetracycline Counseling: Patient counseled regarding possible photosensitivity and increased risk for sunburn.  Patient instructed to avoid sunlight, if possible.  When exposed to sunlight, patients should wear protective clothing, sunglasses, and sunscreen.  The patient was instructed to call the office immediately if the following severe adverse effects occur:  hearing changes, easy bruising/bleeding, severe headache, or vision changes.  The patient verbalized understanding of the proper use and possible adverse effects of tetracycline.  All of the patient's questions and concerns were addressed. Patient understands to avoid pregnancy while on therapy due to potential birth defects. Benzoyl Peroxide Pregnancy And Lactation Text: This medication is Pregnancy Category C. It is unknown if benzoyl peroxide is excreted in breast milk. Erythromycin Counseling:  I discussed with the patient the risks of erythromycin including but not limited to GI upset, allergic reaction, drug rash, diarrhea, increase in liver enzymes, and yeast infections. Detail Level: Zone Griseofulvin Pregnancy And Lactation Text: This medication is Pregnancy Category X and is known to cause serious birth defects. It is unknown if this medication is excreted in breast milk but breast feeding should be avoided. Simponi Counseling:  I discussed with the patient the risks of golimumab including but not limited to myelosuppression, immunosuppression, autoimmune hepatitis, demyelinating diseases, lymphoma, and serious infections.  The patient understands that monitoring is required including a PPD at baseline and must alert us or the primary physician if symptoms of infection or other concerning signs are noted. Prednisone Counseling:  I discussed with the patient the risks of prolonged use of prednisone including but not limited to weight gain, insomnia, osteoporosis, mood changes, diabetes, susceptibility to infection, glaucoma and high blood pressure.  In cases where prednisone use is prolonged, patients should be monitored with blood pressure checks, serum glucose levels and an eye exam.  Additionally, the patient may need to be placed on GI prophylaxis, PCP prophylaxis, and calcium and vitamin D supplementation and/or a bisphosphonate.  The patient verbalized understanding of the proper use and the possible adverse effects of prednisone.  All of the patient's questions and concerns were addressed. Include Pregnancy/Lactation Warning?: No Solaraze Counseling:  I discussed with the patient the risks of Solaraze including but not limited to erythema, scaling, itching, weeping, crusting, and pain. Ketoconazole Counseling:   Patient counseled regarding improving absorption with orange juice.  Adverse effects include but are not limited to breast enlargement, headache, diarrhea, nausea, upset stomach, liver function test abnormalities, taste disturbance, and stomach pain.  There is a rare possibility of liver failure that can occur when taking ketoconazole. The patient understands that monitoring of LFTs may be required, especially at baseline. The patient verbalized understanding of the proper use and possible adverse effects of ketoconazole.  All of the patient's questions and concerns were addressed. Minocycline Counseling: Patient advised regarding possible photosensitivity and discoloration of the teeth, skin, lips, tongue and gums.  Patient instructed to avoid sunlight, if possible.  When exposed to sunlight, patients should wear protective clothing, sunglasses, and sunscreen.  The patient was instructed to call the office immediately if the following severe adverse effects occur:  hearing changes, easy bruising/bleeding, severe headache, or vision changes.  The patient verbalized understanding of the proper use and possible adverse effects of minocycline.  All of the patient's questions and concerns were addressed. Ilumya Pregnancy And Lactation Text: The risk during pregnancy and breastfeeding is uncertain with this medication. Isotretinoin Counseling: Patient should get monthly blood tests, not donate blood, not drive at night if vision affected, not share medication, and not undergo elective surgery for 6 months after tx completed. Side effects reviewed, pt to contact office should one occur. Erivedge Counseling- I discussed with the patient the risks of Erivedge including but not limited to nausea, vomiting, diarrhea, constipation, weight loss, changes in the sense of taste, decreased appetite, muscle spasms, and hair loss.  The patient verbalized understanding of the proper use and possible adverse effects of Erivedge.  All of the patient's questions and concerns were addressed. High Dose Vitamin A Pregnancy And Lactation Text: High dose vitamin A therapy is contraindicated during pregnancy and breast feeding. Cyclosporine Counseling:  I discussed with the patient the risks of cyclosporine including but not limited to hypertension, gingival hyperplasia,myelosuppression, immunosuppression, liver damage, kidney damage, neurotoxicity, lymphoma, and serious infections. The patient understands that monitoring is required including baseline blood pressure, CBC, CMP, lipid panel and uric acid, and then 1-2 times monthly CMP and blood pressure. Cimetidine Counseling:  I discussed with the patient the risks of Cimetidine including but not limited to gynecomastia, headache, diarrhea, nausea, drowsiness, arrhythmias, pancreatitis, skin rashes, psychosis, bone marrow suppression and kidney toxicity. Glycopyrrolate Counseling:  I discussed with the patient the risks of glycopyrrolate including but not limited to skin rash, drowsiness, dry mouth, difficulty urinating, and blurred vision. Colchicine Pregnancy And Lactation Text: This medication is Pregnancy Category C and isn't considered safe during pregnancy. It is excreted in breast milk. Drysol Pregnancy And Lactation Text: This medication is considered safe during pregnancy and breast feeding. 5-Fu Counseling: 5-Fluorouracil Counseling:  I discussed with the patient the risks of 5-fluorouracil including but not limited to erythema, scaling, itching, weeping, crusting, and pain. Topical Sulfur Applications Pregnancy And Lactation Text: This medication is Pregnancy Category C and has an unknown safety profile during pregnancy. It is unknown if this topical medication is excreted in breast milk. Cellcept Pregnancy And Lactation Text: This medication is Pregnancy Category D and isn't considered safe during pregnancy. It is unknown if this medication is excreted in breast milk. Clindamycin Counseling: I counseled the patient regarding use of clindamycin as an antibiotic for prophylactic and/or therapeutic purposes. Clindamycin is active against numerous classes of bacteria, including skin bacteria. Side effects may include nausea, diarrhea, gastrointestinal upset, rash, hives, yeast infections, and in rare cases, colitis. Topical Sulfur Applications Counseling: Topical Sulfur Counseling: Patient counseled that this medication may cause skin irritation or allergic reactions.  In the event of skin irritation, the patient was advised to reduce the amount of the drug applied or use it less frequently.   The patient verbalized understanding of the proper use and possible adverse effects of topical sulfur application.  All of the patient's questions and concerns were addressed. Hydroxychloroquine Pregnancy And Lactation Text: This medication has been shown to cause fetal harm but it isn't assigned a Pregnancy Risk Category. There are small amounts excreted in breast milk. Albendazole Counseling:  I discussed with the patient the risks of albendazole including but not limited to cytopenia, kidney damage, nausea/vomiting and severe allergy.  The patient understands that this medication is being used in an off-label manner. Hydroquinone Counseling:  Patient advised that medication may result in skin irritation, lightening (hypopigmentation), dryness, and burning.  In the event of skin irritation, the patient was advised to reduce the amount of the drug applied or use it less frequently.  Rarely, spots that are treated with hydroquinone can become darker (pseudoochronosis).  Should this occur, patient instructed to stop medication and call the office. The patient verbalized understanding of the proper use and possible adverse effects of hydroquinone.  All of the patient's questions and concerns were addressed. Zyclara Counseling:  I discussed with the patient the risks of imiquimod including but not limited to erythema, scaling, itching, weeping, crusting, and pain.  Patient understands that the inflammatory response to imiquimod is variable from person to person and was educated regarded proper titration schedule.  If flu-like symptoms develop, patient knows to discontinue the medication and contact us. Doxycycline Pregnancy And Lactation Text: This medication is Pregnancy Category D and not consider safe during pregnancy. It is also excreted in breast milk but is considered safe for shorter treatment courses. Benzoyl Peroxide Counseling: Patient counseled that medicine may cause skin irritation and bleach clothing.  In the event of skin irritation, the patient was advised to reduce the amount of the drug applied or use it less frequently.   The patient verbalized understanding of the proper use and possible adverse effects of benzoyl peroxide.  All of the patient's questions and concerns were addressed. Rifampin Counseling: I discussed with the patient the risks of rifampin including but not limited to liver damage, kidney damage, red-orange body fluids, nausea/vomiting and severe allergy. 5-Fu Pregnancy And Lactation Text: This medication is Pregnancy Category X and contraindicated in pregnancy and in women who may become pregnant. It is unknown if this medication is excreted in breast milk. Picato Counseling:  I discussed with the patient the risks of Picato including but not limited to erythema, scaling, itching, weeping, crusting, and pain. Azathioprine Counseling:  I discussed with the patient the risks of azathioprine including but not limited to myelosuppression, immunosuppression, hepatotoxicity, lymphoma, and infections.  The patient understands that monitoring is required including baseline LFTs, Creatinine, possible TPMP genotyping and weekly CBCs for the first month and then every 2 weeks thereafter.  The patient verbalized understanding of the proper use and possible adverse effects of azathioprine.  All of the patient's questions and concerns were addressed. Bexarotene Pregnancy And Lactation Text: This medication is Pregnancy Category X and should not be given to women who are pregnant or may become pregnant. This medication should not be used if you are breast feeding. Clindamycin Pregnancy And Lactation Text: This medication can be used in pregnancy if certain situations. Clindamycin is also present in breast milk. Cellcept Counseling:  I discussed with the patient the risks of mycophenolate mofetil including but not limited to infection/immunosuppression, GI upset, hypokalemia, hypercholesterolemia, bone marrow suppression, lymphoproliferative disorders, malignancy, GI ulceration/bleed/perforation, colitis, interstitial lung disease, kidney failure, progressive multifocal leukoencephalopathy, and birth defects.  The patient understands that monitoring is required including a baseline creatinine and regular CBC testing. In addition, patient must alert us immediately if symptoms of infection or other concerning signs are noted. Metronidazole Pregnancy And Lactation Text: This medication is Pregnancy Category B and considered safe during pregnancy.  It is also excreted in breast milk. Xeljanz Counseling: I discussed with the patient the risks of Xeljanz therapy including increased risk of infection, liver issues, headache, diarrhea, or cold symptoms. Live vaccines should be avoided. They were instructed to call if they have any problems. Fluconazole Counseling:  Patient counseled regarding adverse effects of fluconazole including but not limited to headache, diarrhea, nausea, upset stomach, liver function test abnormalities, taste disturbance, and stomach pain.  There is a rare possibility of liver failure that can occur when taking fluconazole.  The patient understands that monitoring of LFTs and kidney function test may be required, especially at baseline. The patient verbalized understanding of the proper use and possible adverse effects of fluconazole.  All of the patient's questions and concerns were addressed. Cephalexin Counseling: I counseled the patient regarding use of cephalexin as an antibiotic for prophylactic and/or therapeutic purposes. Cephalexin (commonly prescribed under brand name Keflex) is a cephalosporin antibiotic which is active against numerous classes of bacteria, including most skin bacteria. Side effects may include nausea, diarrhea, gastrointestinal upset, rash, hives, yeast infections, and in rare cases, hepatitis, kidney disease, seizures, fever, confusion, neurologic symptoms, and others. Patients with severe allergies to penicillin medications are cautioned that there is about a 10% incidence of cross-reactivity with cephalosporins. When possible, patients with penicillin allergies should use alternatives to cephalosporins for antibiotic therapy. Acitretin Pregnancy And Lactation Text: This medication is Pregnancy Category X and should not be given to women who are pregnant or may become pregnant in the future. This medication is excreted in breast milk. Itraconazole Counseling:  I discussed with the patient the risks of itraconazole including but not limited to liver damage, nausea/vomiting, neuropathy, and severe allergy.  The patient understands that this medication is best absorbed when taken with acidic beverages such as non-diet cola or ginger ale.  The patient understands that monitoring is required including baseline LFTs and repeat LFTs at intervals.  The patient understands that they are to contact us or the primary physician if concerning signs are noted. Cephalexin Pregnancy And Lactation Text: This medication is Pregnancy Category B and considered safe during pregnancy.  It is also excreted in breast milk but can be used safely for shorter doses. Griseofulvin Counseling:  I discussed with the patient the risks of griseofulvin including but not limited to photosensitivity, cytopenia, liver damage, nausea/vomiting and severe allergy.  The patient understands that this medication is best absorbed when taken with a fatty meal (e.g., ice cream or french fries). Acitretin Counseling:  I discussed with the patient the risks of acitretin including but not limited to hair loss, dry lips/skin/eyes, liver damage, hyperlipidemia, depression/suicidal ideation, photosensitivity.  Serious rare side effects can include but are not limited to pancreatitis, pseudotumor cerebri, bony changes, clot formation/stroke/heart attack.  Patient understands that alcohol is contraindicated since it can result in liver toxicity and significantly prolong the elimination of the drug by many years. Arava Counseling:  Patient counseled regarding adverse effects of Arava including but not limited to nausea, vomiting, abnormalities in liver function tests. Patients may develop mouth sores, rash, diarrhea, and abnormalities in blood counts. The patient understands that monitoring is required including LFTs and blood counts.  There is a rare possibility of scarring of the liver and lung problems that can occur when taking methotrexate. Persistent nausea, loss of appetite, pale stools, dark urine, cough, and shortness of breath should be reported immediately. Patient advised to discontinue Arava treatment and consult with a physician prior to attempting conception. The patient will have to undergo a treatment to eliminate Arava from the body prior to conception. Bexarotene Counseling:  I discussed with the patient the risks of bexarotene including but not limited to hair loss, dry lips/skin/eyes, liver abnormalities, hyperlipidemia, pancreatitis, depression/suicidal ideation, photosensitivity, drug rash/allergic reactions, hypothyroidism, anemia, leukopenia, infection, cataracts, and teratogenicity.  Patient understands that they will need regular blood tests to check lipid profile, liver function tests, white blood cell count, thyroid function tests and pregnancy test if applicable. Albendazole Pregnancy And Lactation Text: This medication is Pregnancy Category C and it isn't known if it is safe during pregnancy. It is also excreted in breast milk. Rifampin Pregnancy And Lactation Text: This medication is Pregnancy Category C and it isn't know if it is safe during pregnancy. It is also excreted in breast milk and should not be used if you are breast feeding. Dupixent Pregnancy And Lactation Text: This medication likely crosses the placenta but the risk for the fetus is uncertain. This medication is excreted in breast milk. Nsaids Pregnancy And Lactation Text: These medications are considered safe up to 30 weeks gestation. It is excreted in breast milk. Protopic Pregnancy And Lactation Text: This medication is Pregnancy Category C. It is unknown if this medication is excreted in breast milk when applied topically. Cyclosporine Pregnancy And Lactation Text: This medication is Pregnancy Category C and it isn't know if it is safe during pregnancy. This medication is excreted in breast milk. Cimetidine Pregnancy And Lactation Text: This medication is Pregnancy Category B and is considered safe during pregnancy. It is also excreted in breast milk and breast feeding isn't recommended. Cosentyx Counseling:  I discussed with the patient the risks of Cosentyx including but not limited to worsening of Crohn's disease, immunosuppression, allergic reactions and infections.  The patient understands that monitoring is required including a PPD at baseline and must alert us or the primary physician if symptoms of infection or other concerning signs are noted. Protopic Counseling: Patient may experience a mild burning sensation during topical application. Protopic is not approved in children less than 2 years of age. There have been case reports of hematologic and skin malignancies in patients using topical calcineurin inhibitors although causality is questionable. Sarecycline Pregnancy And Lactation Text: This medication is Pregnancy Category D and not consider safe during pregnancy. It is also excreted in breast milk. Enbrel Counseling:  I discussed with the patient the risks of etanercept including but not limited to myelosuppression, immunosuppression, autoimmune hepatitis, demyelinating diseases, lymphoma, and infections.  The patient understands that monitoring is required including a PPD at baseline and must alert us or the primary physician if symptoms of infection or other concerning signs are noted. Solaraze Pregnancy And Lactation Text: This medication is Pregnancy Category B and is considered safe. There is some data to suggest avoiding during the third trimester. It is unknown if this medication is excreted in breast milk. Oxybutynin Counseling:  I discussed with the patient the risks of oxybutynin including but not limited to skin rash, drowsiness, dry mouth, difficulty urinating, and blurred vision. Topical Clindamycin Pregnancy And Lactation Text: This medication is Pregnancy Category B and is considered safe during pregnancy. It is unknown if it is excreted in breast milk. Xolair Pregnancy And Lactation Text: This medication is Pregnancy Category B and is considered safe during pregnancy. This medication is excreted in breast milk. Stelara Counseling:  I discussed with the patient the risks of ustekinumab including but not limited to immunosuppression, malignancy, posterior leukoencephalopathy syndrome, and serious infections.  The patient understands that monitoring is required including a PPD at baseline and must alert us or the primary physician if symptoms of infection or other concerning signs are noted. Gabapentin Counseling: I discussed with the patient the risks of gabapentin including but not limited to dizziness, somnolence, fatigue and ataxia. Isotretinoin Pregnancy And Lactation Text: This medication is Pregnancy Category X and is considered extremely dangerous during pregnancy. It is unknown if it is excreted in breast milk. Ivermectin Counseling:  Patient instructed to take medication on an empty stomach with a full glass of water.  Patient informed of potential adverse effects including but not limited to nausea, diarrhea, dizziness, itching, and swelling of the extremities or lymph nodes.  The patient verbalized understanding of the proper use and possible adverse effects of ivermectin.  All of the patient's questions and concerns were addressed. Otezla Counseling: The side effects of Otezla were discussed with the patient, including but not limited to worsening or new depression, weight loss, diarrhea, nausea, upper respiratory tract infection, and headache. Patient instructed to call the office should any adverse effect occur.  The patient verbalized understanding of the proper use and possible adverse effects of Otezla.  All the patient's questions and concerns were addressed. Infliximab Counseling:  I discussed with the patient the risks of infliximab including but not limited to myelosuppression, immunosuppression, autoimmune hepatitis, demyelinating diseases, lymphoma, and serious infections.  The patient understands that monitoring is required including a PPD at baseline and must alert us or the primary physician if symptoms of infection or other concerning signs are noted. Doxepin Pregnancy And Lactation Text: This medication is Pregnancy Category C and it isn't known if it is safe during pregnancy. It is also excreted in breast milk and breast feeding isn't recommended. SSKI Counseling:  I discussed with the patient the risks of SSKI including but not limited to thyroid abnormalities, metallic taste, GI upset, fever, headache, acne, arthralgias, paraesthesias, lymphadenopathy, easy bleeding, arrhythmias, and allergic reaction. Xelbhavinz Pregnancy And Lactation Text: This medication is Pregnancy Category D and is not considered safe during pregnancy.  The risk during breast feeding is also uncertain. Xolair Counseling:  Patient informed of potential adverse effects including but not limited to fever, muscle aches, rash and allergic reactions.  The patient verbalized understanding of the proper use and possible adverse effects of Xolair.  All of the patient's questions and concerns were addressed. Topical Clindamycin Counseling: Patient counseled that this medication may cause skin irritation or allergic reactions.  In the event of skin irritation, the patient was advised to reduce the amount of the drug applied or use it less frequently.   The patient verbalized understanding of the proper use and possible adverse effects of clindamycin.  All of the patient's questions and concerns were addressed. Otezla Pregnancy And Lactation Text: This medication is Pregnancy Category C and it isn't known if it is safe during pregnancy. It is unknown if it is excreted in breast milk. Topical Retinoid counseling:  Patient advised to apply a pea-sized amount only at bedtime and wait 30 minutes after washing their face before applying.  If too drying, patient may add a non-comedogenic moisturizer. The patient verbalized understanding of the proper use and possible adverse effects of retinoids.  All of the patient's questions and concerns were addressed. Dapsone Counseling: I discussed with the patient the risks of dapsone including but not limited to hemolytic anemia, agranulocytosis, rashes, methemoglobinemia, kidney failure, peripheral neuropathy, headaches, GI upset, and liver toxicity.  Patients who start dapsone require monitoring including baseline LFTs and weekly CBCs for the first month, then every month thereafter.  The patient verbalized understanding of the proper use and possible adverse effects of dapsone.  All of the patient's questions and concerns were addressed. Minoxidil Counseling: Minoxidil is a topical medication which can increase blood flow where it is applied. It is uncertain how this medication increases hair growth. Side effects are uncommon and include stinging and allergic reactions. High Dose Vitamin A Counseling: Side effects reviewed, pt to contact office should one occur. Dupixent Counseling: I discussed with the patient the risks of dupilumab including but not limited to eye infection and irritation, cold sores, injection site reactions, worsening of asthma, allergic reactions and increased risk of parasitic infection.  Live vaccines should be avoided while taking dupilumab. Dupilumab will also interact with certain medications such as warfarin and cyclosporine. The patient understands that monitoring is required and they must alert us or the primary physician if symptoms of infection or other concerning signs are noted. Nsaids Counseling: NSAID Counseling: I discussed with the patient that NSAIDs should be taken with food. Prolonged use of NSAIDs can result in the development of stomach ulcers.  Patient advised to stop taking NSAIDs if abdominal pain occurs.  The patient verbalized understanding of the proper use and possible adverse effects of NSAIDs.  All of the patient's questions and concerns were addressed. Azithromycin Pregnancy And Lactation Text: This medication is considered safe during pregnancy and is also secreted in breast milk. Erythromycin Pregnancy And Lactation Text: This medication is Pregnancy Category B and is considered safe during pregnancy. It is also excreted in breast milk. Rituxan Counseling:  I discussed with the patient the risks of Rituxan infusions. Side effects can include infusion reactions, severe drug rashes including mucocutaneous reactions, reactivation of latent hepatitis and other infections and rarely progressive multifocal leukoencephalopathy.  All of the patient's questions and concerns were addressed. Metronidazole Counseling:  I discussed with the patient the risks of metronidazole including but not limited to seizures, nausea/vomiting, a metallic taste in the mouth, nausea/vomiting and severe allergy. Methotrexate Pregnancy And Lactation Text: This medication is Pregnancy Category X and is known to cause fetal harm. This medication is excreted in breast milk. Odomzo Counseling- I discussed with the patient the risks of Odomzo including but not limited to nausea, vomiting, diarrhea, constipation, weight loss, changes in the sense of taste, decreased appetite, muscle spasms, and hair loss.  The patient verbalized understanding of the proper use and possible adverse effects of Odomzo.  All of the patient's questions and concerns were addressed. Cimzia Pregnancy And Lactation Text: This medication crosses the placenta but can be considered safe in certain situations. Cimzia may be excreted in breast milk. Cimzia Counseling:  I discussed with the patient the risks of Cimzia including but not limited to immunosuppression, allergic reactions and infections.  The patient understands that monitoring is required including a PPD at baseline and must alert us or the primary physician if symptoms of infection or other concerning signs are noted. Carac Counseling:  I discussed with the patient the risks of Carac including but not limited to erythema, scaling, itching, weeping, crusting, and pain. Valtrex Pregnancy And Lactation Text: this medication is Pregnancy Category B and is considered safe during pregnancy. This medication is not directly found in breast milk but it's metabolite acyclovir is present. Ilumya Counseling: I discussed with the patient the risks of tildrakizumab including but not limited to immunosuppression, malignancy, posterior leukoencephalopathy syndrome, and serious infections.  The patient understands that monitoring is required including a PPD at baseline and must alert us or the primary physician if symptoms of infection or other concerning signs are noted. Drysol Counseling:  I discussed with the patient the risks of drysol/aluminum chloride including but not limited to skin rash, itching, irritation, burning. Azithromycin Counseling:  I discussed with the patient the risks of azithromycin including but not limited to GI upset, allergic reaction, drug rash, diarrhea, and yeast infections. Quinolones Counseling:  I discussed with the patient the risks of fluoroquinolones including but not limited to GI upset, allergic reaction, drug rash, diarrhea, dizziness, photosensitivity, yeast infections, liver function test abnormalities, tendonitis/tendon rupture. Dapsone Pregnancy And Lactation Text: This medication is Pregnancy Category C and is not considered safe during pregnancy or breast feeding. Cyclophosphamide Counseling:  I discussed with the patient the risks of cyclophosphamide including but not limited to hair loss, hormonal abnormalities, decreased fertility, abdominal pain, diarrhea, nausea and vomiting, bone marrow suppression and infection. The patient understands that monitoring is required while taking this medication. Tazorac Counseling:  Patient advised that medication is irritating and drying.  Patient may need to apply sparingly and wash off after an hour before eventually leaving it on overnight.  The patient verbalized understanding of the proper use and possible adverse effects of tazorac.  All of the patient's questions and concerns were addressed. Eucrisa Counseling: Patient may experience a mild burning sensation during topical application. Eucrisa is not approved in children less than 2 years of age. Doxepin Counseling:  Patient advised that the medication is sedating and not to drive a car after taking this medication. Patient informed of potential adverse effects including but not limited to dry mouth, urinary retention, and blurry vision.  The patient verbalized understanding of the proper use and possible adverse effects of doxepin.  All of the patient's questions and concerns were addressed. Thalidomide Counseling: I discussed with the patient the risks of thalidomide including but not limited to birth defects, anxiety, weakness, chest pain, dizziness, cough and severe allergy. Cyclophosphamide Pregnancy And Lactation Text: This medication is Pregnancy Category D and it isn't considered safe during pregnancy. This medication is excreted in breast milk. Hydroxyzine Counseling: Patient advised that the medication is sedating and not to drive a car after taking this medication.  Patient informed of potential adverse effects including but not limited to dry mouth, urinary retention, and blurry vision.  The patient verbalized understanding of the proper use and possible adverse effects of hydroxyzine.  All of the patient's questions and concerns were addressed. Elidel Counseling: Patient may experience a mild burning sensation during topical application. Elidel is not approved in children less than 2 years of age. There have been case reports of hematologic and skin malignancies in patients using topical calcineurin inhibitors although causality is questionable. Birth Control Pills Counseling: Birth Control Pill Counseling: I discussed with the patient the potential side effects of OCPs including but not limited to increased risk of stroke, heart attack, thrombophlebitis, deep venous thrombosis, hepatic adenomas, breast changes, GI upset, headaches, and depression.  The patient verbalized understanding of the proper use and possible adverse effects of OCPs. All of the patient's questions and concerns were addressed. Hydroxychloroquine Counseling:  I discussed with the patient that a baseline ophthalmologic exam is needed at the start of therapy and every year thereafter while on therapy. A CBC may also be warranted for monitoring.  The side effects of this medication were discussed with the patient, including but not limited to agranulocytosis, aplastic anemia, seizures, rashes, retinopathy, and liver toxicity. Patient instructed to call the office should any adverse effect occur.  The patient verbalized understanding of the proper use and possible adverse effects of Plaquenil.  All the patient's questions and concerns were addressed. Colchicine Counseling:  Patient counseled regarding adverse effects including but not limited to stomach upset (nausea, vomiting, stomach pain, or diarrhea).  Patient instructed to limit alcohol consumption while taking this medication.  Colchicine may reduce blood counts especially with prolonged use.  The patient understands that monitoring of kidney function and blood counts may be required, especially at baseline. The patient verbalized understanding of the proper use and possible adverse effects of colchicine.  All of the patient's questions and concerns were addressed. Doxycycline Counseling:  Patient counseled regarding possible photosensitivity and increased risk for sunburn.  Patient instructed to avoid sunlight, if possible.  When exposed to sunlight, patients should wear protective clothing, sunglasses, and sunscreen.  The patient was instructed to call the office immediately if the following severe adverse effects occur:  hearing changes, easy bruising/bleeding, severe headache, or vision changes.  The patient verbalized understanding of the proper use and possible adverse effects of doxycycline.  All of the patient's questions and concerns were addressed. Bactrim Pregnancy And Lactation Text: This medication is Pregnancy Category D and is known to cause fetal risk.  It is also excreted in breast milk. Sarecycline Counseling: Patient advised regarding possible photosensitivity and discoloration of the teeth, skin, lips, tongue and gums.  Patient instructed to avoid sunlight, if possible.  When exposed to sunlight, patients should wear protective clothing, sunglasses, and sunscreen.  The patient was instructed to call the office immediately if the following severe adverse effects occur:  hearing changes, easy bruising/bleeding, severe headache, or vision changes.  The patient verbalized understanding of the proper use and possible adverse effects of sarecycline.  All of the patient's questions and concerns were addressed. Ketoconazole Pregnancy And Lactation Text: This medication is Pregnancy Category C and it isn't know if it is safe during pregnancy. It is also excreted in breast milk and breast feeding isn't recommended.

## 2023-11-27 NOTE — PLAN OF CARE
Problem: Discharge Planning  Goal: Discharge to home or other facility with appropriate resources  11/27/2023 0252 by Leeann Rodrigues RN  Outcome: Progressing  11/26/2023 1725 by Josh Kimball RN  Outcome: Progressing     Problem: ABCDS Injury Assessment  Goal: Absence of physical injury  11/27/2023 0252 by Leeann Rodrigues RN  Outcome: Progressing  11/26/2023 1725 by Josh Kimball RN  Outcome: Progressing     Problem: Safety - Adult  Goal: Free from fall injury  11/27/2023 0252 by Leeann Rodrigues RN  Outcome: Progressing  11/26/2023 1725 by Josh Kimball RN  Outcome: Progressing     Problem: Pain  Goal: Verbalizes/displays adequate comfort level or baseline comfort level  11/27/2023 0252 by Leeann Rodrigues RN  Outcome: Progressing  11/26/2023 1725 by Josh Kimball RN  Outcome: Progressing

## 2023-11-27 NOTE — DISCHARGE SUMMARY
PULMONOLOGY  IP CONSULT TO DIABETES EDUCATOR  IP CONSULT TO DIABETES EDUCATOR  IP CONSULT TO NEPHROLOGY      The patient was seen and examined on day of discharge and this discharge summary is in conjunction with any daily progress note from day of discharge. Discharge plan:       Disposition: Home    Physician Follow Up:     KARON Price CNP  [facility] [address] [facility] [address]  [phone]    Follow up in 1 week(s)      KARON Price CNP  [place] [id number] [facility] [address]  [phone]             Requiring Further Evaluation/Follow Up POST HOSPITALIZATION/Incidental Findings: Follow-up with PCP    Diet: diabetic diet    Activity: As tolerated    Instructions to Patient:   Do not take losartan for few days because of the hyperkalemia at this admission  Follow-up with PCP regarding restarting losartan in about a week. If the insulin pump does not work at home,  please start taking insulin Lantus as prescribed.     Discharge Medications:      Medication List        START taking these medications      Lantus SoloStar 100 UNIT/ML injection pen  Generic drug: insulin glargine  Inject 10 Units into the skin nightly            CONTINUE taking these medications      NovoLOG 100 UNIT/ML injection vial  Generic drug: insulin aspart     vitamin D 1.25 MG (65430 UT) Caps capsule  Commonly known as: ERGOCALCIFEROL  Take 1 capsule by mouth once a week            STOP taking these medications      losartan 50 MG tablet  Commonly known as: COZAAR            ASK your doctor about these medications      hydrOXYzine HCl 25 MG tablet  Commonly known as: ATARAX               Where to Get Your Medications        These medications were sent to [address] [place] [address]      Phone: [phone]   Lantus SoloStar 100 UNIT/ML injection pen         Time Spent on discharge is  35 mins in patient

## 2023-11-27 NOTE — CONSULTS
Diastolic (68UVO), HVT:11, Min:43, Max:106   PULSE OXIMETRY RANGE: SpO2  Av.3 %  Min: 94 %  Max: 100 %  24HR INTAKE/OUTPUT:    Intake/Output Summary (Last 24 hours) at 2023 1756  Last data filed at 2023 1306  Gross per 24 hour   Intake 3108.73 ml   Output --   Net 3108.73 ml     Constitutional: alert, appears stated age, and cooperative    Skin: Skin color, texture, turgor normal. No rashes or lesions    Head: Normocephalic, without obvious abnormality, atraumatic     ENT:   No epistaxis or rhinorrhea. Neck:   Supple with no JVD. Cardiovascular/Edema: regular rate and rhythm, S1, S2 normal, no murmur, click, rub or gallop    Respiratory: Lungs: clear to auscultation bilaterally    Abdomen: soft, non-tender; bowel sounds normal; no masses,  no organomegaly    Back: symmetric, no curvature. ROM normal. No CVA tenderness.     Extremities: extremities normal, atraumatic, no cyanosis or edema    Neuro:  Grossly normal    CBC:   Recent Labs     23  1040 23  0407   WBC 10.8 7.4   HGB 10.0* 8.8*    287     BMP:    Recent Labs     23  2210 23  0407 23  0852   * 133* 135   K 5.4* 5.5* 5.0    105 108*   CO2 20 17* 18*   BUN 36* 35* 32*   CREATININE 2.6* 2.6* 2.5*   GLUCOSE 227* 266* 236*     Lab Results   Component Value Date/Time    NITRU NEGATIVE 2023 10:51 AM    COLORU Yellow 2023 10:51 AM    PHUR 5.5 2023 10:51 AM    LABCAST 10-20 Hyaline 2016 01:30 AM    WBCUA 3 to 5 2023 10:51 AM    RBCUA 10 TO 20 2023 10:51 AM    MUCUS 1+ 2022 02:11 AM    YEAST Present 2016 01:30 AM    BACTERIA None 2023 10:51 AM    CLARITYU CLOUDY 2016 01:30 AM    SPECGRAV 1.017 2023 10:51 AM    LEUKOCYTESUR NEGATIVE 2023 10:51 AM    UROBILINOGEN Normal 2023 10:51 AM    BILIRUBINUR NEGATIVE 2023 10:51 AM    BLOODU LARGE 2016 01:30 AM    GLUCOSEU LARGE 2023 10:51 AM    МАРИНА MORALES

## 2023-11-27 NOTE — CARE COORDINATION
Case Management Note:    Writer went to speak to pt. Pt. Is currently dressed & states Keila Grullon is being DC now & is waiting for her Paperwork\". Pt. Is from Home, Denies needs at this time. Writer informed her to let writer know if needs change prior to DC.

## 2023-11-27 NOTE — PLAN OF CARE
Problem: Discharge Planning  Goal: Discharge to home or other facility with appropriate resources  11/27/2023 1145 by Tanner Mtz RN  Outcome: Adequate for Discharge  11/27/2023 1011 by Tanner Mtz RN  Outcome: Progressing  Flowsheets (Taken 11/27/2023 0800)  Discharge to home or other facility with appropriate resources:   Identify barriers to discharge with patient and caregiver   Arrange for needed discharge resources and transportation as appropriate   Identify discharge learning needs (meds, wound care, etc)   Arrange for interpreters to assist at discharge as needed   Refer to discharge planning if patient needs post-hospital services based on physician order or complex needs related to functional status, cognitive ability or social support system  11/27/2023 0252 by Cheli Anthony RN  Outcome: Progressing     Problem: ABCDS Injury Assessment  Goal: Absence of physical injury  11/27/2023 1145 by Tanner Mtz RN  Outcome: Adequate for Discharge  11/27/2023 1011 by Tanner Mtz RN  Outcome: Progressing  Flowsheets (Taken 11/27/2023 1010)  Absence of Physical Injury: Implement safety measures based on patient assessment  11/27/2023 0252 by Cheli Anthony RN  Outcome: Progressing     Problem: Safety - Adult  Goal: Free from fall injury  11/27/2023 1145 by Tanner Mtz RN  Outcome: Adequate for Discharge  11/27/2023 1011 by Tanner Mtz RN  Outcome: Progressing  Flowsheets (Taken 11/27/2023 1010)  Free From Fall Injury: Instruct family/caregiver on patient safety  11/27/2023 0252 by Cheli Anthony RN  Outcome: Progressing     Problem: Pain  Goal: Verbalizes/displays adequate comfort level or baseline comfort level  11/27/2023 1145 by Tanner Mtz RN  Outcome: Adequate for Discharge  11/27/2023 1011 by Tanner Mtz RN  Outcome: Progressing  11/27/2023 0252 by Cheli Anthony RN  Outcome: Progressing      Patient appropriate for discharge at this time.

## 2023-11-27 NOTE — PLAN OF CARE
Problem: Discharge Planning  Goal: Discharge to home or other facility with appropriate resources  11/27/2023 1011 by Azalia Kanner, RN  Outcome: Progressing  Flowsheets (Taken 11/27/2023 0800)  Discharge to home or other facility with appropriate resources:   Identify barriers to discharge with patient and caregiver   Arrange for needed discharge resources and transportation as appropriate   Identify discharge learning needs (meds, wound care, etc)   Arrange for interpreters to assist at discharge as needed   Refer to discharge planning if patient needs post-hospital services based on physician order or complex needs related to functional status, cognitive ability or social support system     Problem: ABCDS Injury Assessment  Goal: Absence of physical injury  11/27/2023 1011 by Azalia Kanner, RN  Outcome: Progressing  Flowsheets (Taken 11/27/2023 1010)  Absence of Physical Injury: Implement safety measures based on patient assessment       Problem: Safety - Adult  Goal: Free from fall injury  11/27/2023 1011 by Azalia Kanner, RN  Outcome: Progressing  Flowsheets (Taken 11/27/2023 1010)  Free From Fall Injury: Instruct family/caregiver on patient safety       Problem: Pain  Goal: Verbalizes/displays adequate comfort level or baseline comfort level  11/27/2023 1011 by Azalia Kanner, RN  Outcome: Progressing

## 2024-02-19 ENCOUNTER — OFFICE VISIT (OUTPATIENT)
Dept: PRIMARY CARE CLINIC | Age: 39
End: 2024-02-19

## 2024-02-19 ENCOUNTER — TELEPHONE (OUTPATIENT)
Dept: PRIMARY CARE CLINIC | Age: 39
End: 2024-02-19

## 2024-02-19 VITALS
SYSTOLIC BLOOD PRESSURE: 149 MMHG | WEIGHT: 166.6 LBS | OXYGEN SATURATION: 100 % | DIASTOLIC BLOOD PRESSURE: 98 MMHG | HEART RATE: 94 BPM | BODY MASS INDEX: 31.45 KG/M2 | HEIGHT: 61 IN

## 2024-02-19 DIAGNOSIS — Z13.9 DUE FOR SCREENING: ICD-10-CM

## 2024-02-19 DIAGNOSIS — E10.22 TYPE 1 DIABETES MELLITUS WITH STAGE 3A CHRONIC KIDNEY DISEASE (HCC): ICD-10-CM

## 2024-02-19 DIAGNOSIS — N18.31 TYPE 1 DIABETES MELLITUS WITH STAGE 3A CHRONIC KIDNEY DISEASE (HCC): ICD-10-CM

## 2024-02-19 DIAGNOSIS — R53.83 FATIGUE, UNSPECIFIED TYPE: ICD-10-CM

## 2024-02-19 DIAGNOSIS — I10 HYPERTENSION, UNSPECIFIED TYPE: ICD-10-CM

## 2024-02-19 DIAGNOSIS — F43.23 ADJUSTMENT DISORDER WITH MIXED ANXIETY AND DEPRESSED MOOD: Primary | ICD-10-CM

## 2024-02-19 RX ORDER — LOSARTAN POTASSIUM 50 MG/1
50 TABLET ORAL DAILY
COMMUNITY
End: 2024-02-19 | Stop reason: SDUPTHER

## 2024-02-19 RX ORDER — LOSARTAN POTASSIUM 50 MG/1
50 TABLET ORAL DAILY
Qty: 30 TABLET | Refills: 5 | Status: SHIPPED | OUTPATIENT
Start: 2024-02-19

## 2024-02-19 RX ORDER — SERTRALINE HYDROCHLORIDE 25 MG/1
25 TABLET, FILM COATED ORAL DAILY
Qty: 30 TABLET | Refills: 3 | Status: SHIPPED | OUTPATIENT
Start: 2024-02-19

## 2024-02-19 ASSESSMENT — PATIENT HEALTH QUESTIONNAIRE - PHQ9
SUM OF ALL RESPONSES TO PHQ QUESTIONS 1-9: 0
2. FEELING DOWN, DEPRESSED OR HOPELESS: 0
SUM OF ALL RESPONSES TO PHQ QUESTIONS 1-9: 0
SUM OF ALL RESPONSES TO PHQ9 QUESTIONS 1 & 2: 0
1. LITTLE INTEREST OR PLEASURE IN DOING THINGS: 0

## 2024-02-19 NOTE — PROGRESS NOTES
nephrologist with her blood pressure readings for medication adjustments.  Patient to follow-up with further directions.      Review of Systems   Constitutional:  Positive for fatigue. Negative for activity change, appetite change and fever.   HENT:  Negative for sinus pressure, sinus pain and sore throat.    Eyes:  Negative for photophobia and visual disturbance.   Respiratory:  Negative for cough, chest tightness and shortness of breath.    Cardiovascular:  Negative for chest pain.   Gastrointestinal:  Negative for abdominal pain, diarrhea, nausea and vomiting.   Endocrine: Negative for polydipsia and polyuria.   Genitourinary:  Negative for difficulty urinating.   Musculoskeletal:  Negative for back pain and neck pain.   Skin:  Negative for color change.   Neurological:  Negative for dizziness, light-headedness and headaches.   Psychiatric/Behavioral:  Positive for sleep disturbance. Negative for behavioral problems.         Depression            Objective   Physical Exam  Vitals and nursing note reviewed.   Constitutional:       General: She is not in acute distress.     Appearance: Normal appearance. She is ill-appearing.      Comments: Chronically ill appearing   HENT:      Head: Normocephalic.      Right Ear: External ear normal.      Left Ear: External ear normal.      Nose: Nose normal. No congestion or rhinorrhea.      Mouth/Throat:      Mouth: Mucous membranes are dry.   Eyes:      Conjunctiva/sclera: Conjunctivae normal.      Pupils: Pupils are equal, round, and reactive to light.   Cardiovascular:      Rate and Rhythm: Normal rate and regular rhythm.      Pulses: Normal pulses.      Heart sounds: Normal heart sounds. No murmur heard.  Pulmonary:      Effort: Pulmonary effort is normal. No respiratory distress.      Breath sounds: Normal breath sounds. No wheezing.   Abdominal:      Palpations: Abdomen is soft.      Tenderness: There is no abdominal tenderness.   Musculoskeletal:         General: No

## 2024-02-19 NOTE — TELEPHONE ENCOUNTER
Carli Stoddard was contacted by Magnolia Diaz MA, a Community Health Navigator, regarding a Social Determinants of Health referral.     Writer was unable to leave a message. (Voicemail full/VM not set up/busy signal)    Follow-up attempt.     [address] [facility]   [place], [place] [address]    February 19, 2024    Carli Stoddard  [address] [place] [id number]      Dear Carli:    Your Primary Care Provider placed a referral to connect you with a Community Health Worker for help with resources, but we have been unable to reach you.     If you still need help, please call [phone] by March 1, 2024.  After this date, you will need to ask your Primary Care Provider for a new referral.    For immediate assistance, please call [facility] at [phone].      Sincerely,      Magnolia TALAVERA (AMT)  Community Health Worker  [phone]

## 2024-02-21 ASSESSMENT — ENCOUNTER SYMPTOMS
NAUSEA: 0
BACK PAIN: 0
SHORTNESS OF BREATH: 0
SINUS PAIN: 0
COLOR CHANGE: 0
ABDOMINAL PAIN: 0
SINUS PRESSURE: 0
DIARRHEA: 0
SORE THROAT: 0
PHOTOPHOBIA: 0
VOMITING: 0
COUGH: 0
CHEST TIGHTNESS: 0

## 2024-02-23 NOTE — TELEPHONE ENCOUNTER
Carli Stoddard was contacted by a Community Health Navigator to discuss a referral for SDOH related needs.     Writer spoke with: Patient and explained the services and assistance that can be provided through the Community Health Navigation program.     Patient agreeable to receiving resources and support from writer.     Intake Notes: Patient reached out to writer after receiving letter regarding referrral. Patient needs assistance to see why medicaid was stopped.    Action steps to be completed by writer:  will reach out to [employer] contact for assistance to obtain medicaid coverage    Action steps to be completed by patient: none    Patient has given verbal permission to leave detailed messages regarding SDOH referral on their phone: N/A    Patient has given verbal permission to submit applications on their behalf: yes    Patient voiced understanding of action plan and responsibilities, was provided with writer's contact information, and agrees to call should they require additional assistance: yes      Magnolia Diaz MA

## 2024-02-26 NOTE — TELEPHONE ENCOUNTER
Carli Arlyn was contacted by Magnolia Diaz MA, a Community Health Navigator, regarding a Social Determinants of Health referral.     Writer was unable to leave a message. (Voicemail full/VM not set up/busy signal)    Follow-up attempt. Writer placed phone call to patient and was unable to leave voicemail message. Patient is over income for Medicaid by $81.00, Patient income reported was $1776 and the limit for one person is $1695. Writer will attempt to call patient again on 2/27/24.

## 2024-02-27 NOTE — TELEPHONE ENCOUNTER
Carli Stoddard was contacted by a Community Health Navigator for follow-up regarding SDOH related needs.     Writer spoke with: Patient    Progress Notes: Writer spoke with patient regarding being over income for Medicaid, patient stated she will probably put in notice at her job due to needing health insurance for her medications.     Action steps to be completed by writer:  Writer will reach out to S contact for information when patient can reapply for medicaid and get approval    Action steps to be completed by patient:  provide letter from employer once she put    Patient has given verbal permission to leave detailed messages regarding SDOH referral on their phone: N/A    Patient has given verbal permission to submit applications on their behalf: N/A    Magnolia Diaz MA

## 2024-03-01 NOTE — TELEPHONE ENCOUNTER
Carli Stoddard was contacted by Magnolia Diaz MA, a Community Health Navigator, regarding a Social Determinants of Health referral.     Writer placed phone call to patient to discuss being over income due to SSD payments, unable to reach by phone. Voicemail left for patient

## 2024-03-01 NOTE — TELEPHONE ENCOUNTER
Carli Stoddard was contacted by a Community Health Navigator for follow-up regarding SDOH related needs.     Writer spoke with: Patient    Progress Notes: Spoke with patient regarding being over income for Medicaid due to SSD payments. Patient advised to followup and apply for Medicare Part D extra help assistance. Patient stated she does not need help navigating the online application and she is okay completing the application on her own. Patient will contact PCP if she needs any assistance in the future.    Action steps to be completed by writer: Close referral.    Action steps to be completed by patient: Close referral.    Patient has given verbal permission to leave detailed messages regarding SDOH referral on their phone: N/A    Patient has given verbal permission to submit applications on their behalf: N/A    Magnolia Diaz MA

## 2024-04-10 ENCOUNTER — OFFICE VISIT (OUTPATIENT)
Age: 39
End: 2024-04-10
Payer: COMMERCIAL

## 2024-04-10 DIAGNOSIS — F41.9 ANXIETY: ICD-10-CM

## 2024-04-10 DIAGNOSIS — F43.21 ADJUSTMENT DISORDER WITH DEPRESSED MOOD: Primary | ICD-10-CM

## 2024-04-10 PROCEDURE — 90791 PSYCH DIAGNOSTIC EVALUATION: CPT | Performed by: SOCIAL WORKER

## 2024-04-10 PROCEDURE — 1036F TOBACCO NON-USER: CPT | Performed by: SOCIAL WORKER

## 2024-04-10 NOTE — PROGRESS NOTES
ADULT BEHAVIORAL HEALTH ASSESSMENT  JOVI Motta  Licensed Independent        Visit Date: 4/10/2024   Time of appointment: 10:13 AM     Time spent with Patient: 48 minutes.   This is patient's first appointment.    Reason for Consult:  Anxiety     PCP:  Flor Hoskins APRN - CNP      Pt provided informed consent for the behavioral health program. Discussed with patient model of service to include the limits of confidentiality (i.e. abuse reporting, suicide intervention, etc.) and short-term intervention focused approach. Pt indicated understanding.     PRESENTING PROBLEM AND HISTORY  Carli is a 38 y.o. female who presents for new evaluation and treatment of anxiety.      She has a lot of big life changes happening. Her boyfriend's children moved in with them about 10 months ago. The kids are 13 and 9. She shared there has been a lot of energy in the home now with the kids, feels guilty when she can not calm herself down. She reported she is now worried about the kids due to the mother being more absent. She wants to be supportive and make it work for the kids. She has been in relationship for the last 2 years. She feels that she needs to work on her patience with transition to being responsible for the kids, and managing her anger when feeling overwhelmed. She also discussed the impact of complex health conditions re-enforcing anxious symptoms.     She has the following symptoms: decreased appetite, decreased sleep, fatigue/lack of energy, lack of motivation, isolating self, anger/irritability, feeling nervous, anxious, or on edge, racing worry thoughts, excessive anxiety and worry about specific stressors, inability to stop or control worry, feeling afraid something awful might happen, nightmares or flashbacks about an experience that was horrible, frightening, or upsetting, trying hard not to think of a horrible, frightening, or upsetting event, feeling numb or detached, feeling fidgety or

## 2024-04-15 ENCOUNTER — HOSPITAL ENCOUNTER (OUTPATIENT)
Age: 39
Discharge: HOME | End: 2024-04-15
Payer: MEDICARE

## 2024-04-15 DIAGNOSIS — R53.83 FATIGUE, UNSPECIFIED TYPE: ICD-10-CM

## 2024-04-15 DIAGNOSIS — N18.4 SECONDARY DIABETES MELLITUS WITH STAGE 4 CHRONIC KIDNEY DISEASE (HCC): ICD-10-CM

## 2024-04-15 DIAGNOSIS — R82.71 BACTERIA IN URINE: ICD-10-CM

## 2024-04-15 DIAGNOSIS — R80.9 MICROALBUMINURIA: ICD-10-CM

## 2024-04-15 DIAGNOSIS — E13.22 SECONDARY DIABETES MELLITUS WITH STAGE 4 CHRONIC KIDNEY DISEASE (HCC): ICD-10-CM

## 2024-04-15 DIAGNOSIS — I12.9 BENIGN HYPERTENSION WITH CKD (CHRONIC KIDNEY DISEASE) STAGE IV (HCC): ICD-10-CM

## 2024-04-15 DIAGNOSIS — D63.1 ANEMIA IN STAGE 4 CHRONIC KIDNEY DISEASE (HCC): ICD-10-CM

## 2024-04-15 DIAGNOSIS — R80.9 PROTEINURIA, UNSPECIFIED TYPE: ICD-10-CM

## 2024-04-15 DIAGNOSIS — N18.4 ANEMIA IN STAGE 4 CHRONIC KIDNEY DISEASE (HCC): ICD-10-CM

## 2024-04-15 DIAGNOSIS — E55.9 VITAMIN D DEFICIENCY: ICD-10-CM

## 2024-04-15 DIAGNOSIS — N18.4 CKD (CHRONIC KIDNEY DISEASE) STAGE 4, GFR 15-29 ML/MIN (HCC): ICD-10-CM

## 2024-04-15 DIAGNOSIS — E10.22 TYPE 1 DIABETES MELLITUS WITH STAGE 3A CHRONIC KIDNEY DISEASE (HCC): ICD-10-CM

## 2024-04-15 DIAGNOSIS — Z13.9 DUE FOR SCREENING: ICD-10-CM

## 2024-04-15 DIAGNOSIS — N18.31 TYPE 1 DIABETES MELLITUS WITH STAGE 3A CHRONIC KIDNEY DISEASE (HCC): ICD-10-CM

## 2024-04-15 DIAGNOSIS — N18.4 BENIGN HYPERTENSION WITH CKD (CHRONIC KIDNEY DISEASE) STAGE IV (HCC): ICD-10-CM

## 2024-04-15 LAB
25(OH)D3 SERPL-MCNC: 14.5 NG/ML (ref 30–100)
ANION GAP SERPL CALCULATED.3IONS-SCNC: 11 MMOL/L (ref 9–17)
BACTERIA URNS QL MICRO: ABNORMAL
BILIRUB UR QL STRIP: NEGATIVE
BUN SERPL-MCNC: 45 MG/DL (ref 6–20)
CALCIUM SERPL-MCNC: 8.1 MG/DL (ref 8.6–10.4)
CASTS #/AREA URNS LPF: ABNORMAL /LPF
CHLORIDE SERPL-SCNC: 102 MMOL/L (ref 98–107)
CLARITY UR: CLEAR
CO2 SERPL-SCNC: 21 MMOL/L (ref 20–31)
COLOR UR: YELLOW
CREAT SERPL-MCNC: 3.7 MG/DL (ref 0.5–0.9)
EPI CELLS #/AREA URNS HPF: ABNORMAL /HPF
EST. AVERAGE GLUCOSE BLD GHB EST-MCNC: 134 MG/DL
GFR SERPL CREATININE-BSD FRML MDRD: 15 ML/MIN/1.73M2
GLUCOSE UR STRIP-MCNC: ABNORMAL MG/DL
HBA1C MFR BLD: 6.3 % (ref 4–6)
HCT VFR BLD AUTO: 27.5 % (ref 36–46)
HCV AB SERPL QL IA: NONREACTIVE
HGB BLD-MCNC: 8.8 G/DL (ref 12–16)
HGB UR QL STRIP.AUTO: NEGATIVE
HIV 1+2 AB+HIV1 P24 AG SERPL QL IA: NONREACTIVE
KETONES UR STRIP-MCNC: NEGATIVE MG/DL
LEUKOCYTE ESTERASE UR QL STRIP: NEGATIVE
MAGNESIUM SERPL-MCNC: 2.2 MG/DL (ref 1.6–2.6)
NITRITE UR QL STRIP: NEGATIVE
PH UR STRIP: 5.5 [PH] (ref 5–8)
PHOSPHATE SERPL-MCNC: 3.1 MG/DL (ref 2.6–4.5)
POTASSIUM SERPL-SCNC: 5.3 MMOL/L (ref 3.7–5.3)
PROT UR STRIP-MCNC: ABNORMAL MG/DL
RBC #/AREA URNS HPF: ABNORMAL /HPF
SODIUM SERPL-SCNC: 134 MMOL/L (ref 135–144)
SP GR UR STRIP: 1.01 (ref 1–1.03)
TSH SERPL DL<=0.05 MIU/L-ACNC: 1.23 UIU/ML (ref 0.3–5)
UROBILINOGEN UR STRIP-ACNC: NORMAL EU/DL (ref 0–1)
WBC #/AREA URNS HPF: ABNORMAL /HPF

## 2024-04-15 PROCEDURE — 84100 ASSAY OF PHOSPHORUS: CPT

## 2024-04-15 PROCEDURE — 85018 HEMOGLOBIN: CPT

## 2024-04-15 PROCEDURE — 87086 URINE CULTURE/COLONY COUNT: CPT

## 2024-04-15 PROCEDURE — 82043 UR ALBUMIN QUANTITATIVE: CPT

## 2024-04-15 PROCEDURE — 82306 VITAMIN D 25 HYDROXY: CPT

## 2024-04-15 PROCEDURE — 81001 URINALYSIS AUTO W/SCOPE: CPT

## 2024-04-15 PROCEDURE — 82570 ASSAY OF URINE CREATININE: CPT

## 2024-04-15 PROCEDURE — 82565 ASSAY OF CREATININE: CPT

## 2024-04-15 PROCEDURE — 85014 HEMATOCRIT: CPT

## 2024-04-15 PROCEDURE — 84520 ASSAY OF UREA NITROGEN: CPT

## 2024-04-15 PROCEDURE — 80051 ELECTROLYTE PANEL: CPT

## 2024-04-15 PROCEDURE — 82310 ASSAY OF CALCIUM: CPT

## 2024-04-15 PROCEDURE — 36415 COLL VENOUS BLD VENIPUNCTURE: CPT

## 2024-04-15 PROCEDURE — 86803 HEPATITIS C AB TEST: CPT

## 2024-04-15 PROCEDURE — 84443 ASSAY THYROID STIM HORMONE: CPT

## 2024-04-15 PROCEDURE — 83036 HEMOGLOBIN GLYCOSYLATED A1C: CPT

## 2024-04-15 PROCEDURE — 87389 HIV-1 AG W/HIV-1&-2 AB AG IA: CPT

## 2024-04-15 PROCEDURE — 83735 ASSAY OF MAGNESIUM: CPT

## 2024-04-16 LAB
CREAT UR-MCNC: 65.7 MG/DL (ref 28–217)
MICROALBUMIN UR-MCNC: 1994 MG/L (ref 0–20)
MICROALBUMIN/CREAT UR-RTO: 3035 MCG/MG CREAT (ref 0–25)
MICROORGANISM SPEC CULT: NORMAL
SPECIMEN DESCRIPTION: NORMAL

## 2024-04-17 ENCOUNTER — APPOINTMENT (OUTPATIENT)
Dept: ULTRASOUND IMAGING | Age: 39
DRG: 684 | End: 2024-04-17
Payer: MEDICARE

## 2024-04-17 ENCOUNTER — HOSPITAL ENCOUNTER (INPATIENT)
Age: 39
LOS: 2 days | Discharge: HOME | DRG: 684 | End: 2024-04-19
Attending: EMERGENCY MEDICINE | Admitting: INTERNAL MEDICINE
Payer: MEDICARE

## 2024-04-17 DIAGNOSIS — I10 UNCONTROLLED HYPERTENSION: ICD-10-CM

## 2024-04-17 DIAGNOSIS — N18.5 ACUTE RENAL FAILURE SUPERIMPOSED ON STAGE 5 CHRONIC KIDNEY DISEASE, NOT ON CHRONIC DIALYSIS, UNSPECIFIED ACUTE RENAL FAILURE TYPE (HCC): Primary | ICD-10-CM

## 2024-04-17 DIAGNOSIS — I10 HYPERTENSION, UNSPECIFIED TYPE: ICD-10-CM

## 2024-04-17 DIAGNOSIS — N17.9 ACUTE RENAL FAILURE SUPERIMPOSED ON STAGE 5 CHRONIC KIDNEY DISEASE, NOT ON CHRONIC DIALYSIS, UNSPECIFIED ACUTE RENAL FAILURE TYPE (HCC): Primary | ICD-10-CM

## 2024-04-17 DIAGNOSIS — R06.02 SOB (SHORTNESS OF BREATH): ICD-10-CM

## 2024-04-17 PROBLEM — N18.9 ACUTE KIDNEY INJURY SUPERIMPOSED ON CHRONIC KIDNEY DISEASE (HCC): Status: ACTIVE | Noted: 2024-04-17

## 2024-04-17 LAB
ALBUMIN SERPL-MCNC: 3.6 G/DL (ref 3.5–5.2)
ALP SERPL-CCNC: 88 U/L (ref 35–104)
ALT SERPL-CCNC: 9 U/L (ref 5–33)
ANION GAP SERPL CALCULATED.3IONS-SCNC: 12 MMOL/L (ref 9–17)
AST SERPL-CCNC: 13 U/L
BACTERIA URNS QL MICRO: ABNORMAL
BASOPHILS # BLD: 0.1 K/UL (ref 0–0.2)
BASOPHILS NFR BLD: 1 % (ref 0–2)
BILIRUB SERPL-MCNC: 0.2 MG/DL (ref 0.3–1.2)
BILIRUB UR QL STRIP: NEGATIVE
BNP SERPL-MCNC: 1016 PG/ML
BUN SERPL-MCNC: 46 MG/DL (ref 6–20)
CALCIUM SERPL-MCNC: 8 MG/DL (ref 8.6–10.4)
CASTS #/AREA URNS LPF: ABNORMAL /LPF
CHLORIDE SERPL-SCNC: 103 MMOL/L (ref 98–107)
CLARITY UR: CLEAR
CO2 SERPL-SCNC: 20 MMOL/L (ref 20–31)
COLOR UR: YELLOW
CREAT SERPL-MCNC: 3.7 MG/DL (ref 0.5–0.9)
EOSINOPHIL # BLD: 0.1 K/UL (ref 0–0.4)
EOSINOPHILS RELATIVE PERCENT: 2 % (ref 0–4)
EPI CELLS #/AREA URNS HPF: ABNORMAL /HPF
ERYTHROCYTE [DISTWIDTH] IN BLOOD BY AUTOMATED COUNT: 13 % (ref 11.5–14.9)
GFR SERPL CREATININE-BSD FRML MDRD: 15 ML/MIN/1.73M2
GLUCOSE BLD-MCNC: 120 MG/DL (ref 65–105)
GLUCOSE SERPL-MCNC: 379 MG/DL (ref 70–99)
GLUCOSE UR STRIP-MCNC: ABNORMAL MG/DL
HCT VFR BLD AUTO: 28.3 % (ref 36–46)
HGB BLD-MCNC: 9.1 G/DL (ref 12–16)
HGB UR QL STRIP.AUTO: ABNORMAL
KETONES UR STRIP-MCNC: NEGATIVE MG/DL
LEUKOCYTE ESTERASE UR QL STRIP: NEGATIVE
LYMPHOCYTES NFR BLD: 2.6 K/UL (ref 1–4.8)
LYMPHOCYTES RELATIVE PERCENT: 28 % (ref 24–44)
MCH RBC QN AUTO: 28.4 PG (ref 26–34)
MCHC RBC AUTO-ENTMCNC: 32.1 G/DL (ref 31–37)
MCV RBC AUTO: 88.4 FL (ref 80–100)
MONOCYTES NFR BLD: 0.3 K/UL (ref 0.1–1.3)
MONOCYTES NFR BLD: 3 % (ref 1–7)
NEUTROPHILS NFR BLD: 66 % (ref 36–66)
NEUTS SEG NFR BLD: 6 K/UL (ref 1.3–9.1)
NITRITE UR QL STRIP: NEGATIVE
PH UR STRIP: 6.5 [PH] (ref 5–8)
PLATELET # BLD AUTO: 354 K/UL (ref 150–450)
PMV BLD AUTO: 7.4 FL (ref 6–12)
POTASSIUM SERPL-SCNC: 4.9 MMOL/L (ref 3.7–5.3)
PROT SERPL-MCNC: 7.2 G/DL (ref 6.4–8.3)
PROT UR STRIP-MCNC: ABNORMAL MG/DL
RBC # BLD AUTO: 3.2 M/UL (ref 4–5.2)
RBC #/AREA URNS HPF: ABNORMAL /HPF
SODIUM SERPL-SCNC: 135 MMOL/L (ref 135–144)
SP GR UR STRIP: 1.01 (ref 1–1.03)
TROPONIN I SERPL HS-MCNC: 12 NG/L (ref 0–14)
UROBILINOGEN UR STRIP-ACNC: NORMAL EU/DL (ref 0–1)
WBC #/AREA URNS HPF: ABNORMAL /HPF
WBC OTHER # BLD: 9 K/UL (ref 3.5–11)

## 2024-04-17 PROCEDURE — 99285 EMERGENCY DEPT VISIT HI MDM: CPT

## 2024-04-17 PROCEDURE — 83880 ASSAY OF NATRIURETIC PEPTIDE: CPT

## 2024-04-17 PROCEDURE — 96374 THER/PROPH/DIAG INJ IV PUSH: CPT

## 2024-04-17 PROCEDURE — 2060000000 HC ICU INTERMEDIATE R&B

## 2024-04-17 PROCEDURE — 80053 COMPREHEN METABOLIC PANEL: CPT

## 2024-04-17 PROCEDURE — 2580000003 HC RX 258: Performed by: NURSE PRACTITIONER

## 2024-04-17 PROCEDURE — 6360000002 HC RX W HCPCS: Performed by: STUDENT IN AN ORGANIZED HEALTH CARE EDUCATION/TRAINING PROGRAM

## 2024-04-17 PROCEDURE — 81001 URINALYSIS AUTO W/SCOPE: CPT

## 2024-04-17 PROCEDURE — 6370000000 HC RX 637 (ALT 250 FOR IP): Performed by: NURSE PRACTITIONER

## 2024-04-17 PROCEDURE — 82947 ASSAY GLUCOSE BLOOD QUANT: CPT

## 2024-04-17 PROCEDURE — 36415 COLL VENOUS BLD VENIPUNCTURE: CPT

## 2024-04-17 PROCEDURE — 93005 ELECTROCARDIOGRAM TRACING: CPT

## 2024-04-17 PROCEDURE — 84484 ASSAY OF TROPONIN QUANT: CPT

## 2024-04-17 PROCEDURE — 85025 COMPLETE CBC W/AUTO DIFF WBC: CPT

## 2024-04-17 PROCEDURE — 6360000002 HC RX W HCPCS: Performed by: NURSE PRACTITIONER

## 2024-04-17 PROCEDURE — 76775 US EXAM ABDO BACK WALL LIM: CPT

## 2024-04-17 PROCEDURE — 2580000003 HC RX 258: Performed by: STUDENT IN AN ORGANIZED HEALTH CARE EDUCATION/TRAINING PROGRAM

## 2024-04-17 RX ORDER — ACETAMINOPHEN 325 MG/1
650 TABLET ORAL EVERY 6 HOURS PRN
Status: DISCONTINUED | OUTPATIENT
Start: 2024-04-17 | End: 2024-04-19 | Stop reason: HOSPADM

## 2024-04-17 RX ORDER — SODIUM CHLORIDE 0.9 % (FLUSH) 0.9 %
5-40 SYRINGE (ML) INJECTION PRN
Status: DISCONTINUED | OUTPATIENT
Start: 2024-04-17 | End: 2024-04-19 | Stop reason: HOSPADM

## 2024-04-17 RX ORDER — HYDRALAZINE HYDROCHLORIDE 25 MG/1
25 TABLET, FILM COATED ORAL 3 TIMES DAILY
Status: DISCONTINUED | OUTPATIENT
Start: 2024-04-17 | End: 2024-04-18

## 2024-04-17 RX ORDER — LOSARTAN POTASSIUM 50 MG/1
50 TABLET ORAL DAILY
Status: DISCONTINUED | OUTPATIENT
Start: 2024-04-18 | End: 2024-04-18

## 2024-04-17 RX ORDER — ONDANSETRON 2 MG/ML
4 INJECTION INTRAMUSCULAR; INTRAVENOUS EVERY 6 HOURS PRN
Status: DISCONTINUED | OUTPATIENT
Start: 2024-04-17 | End: 2024-04-19 | Stop reason: HOSPADM

## 2024-04-17 RX ORDER — DEXTROSE MONOHYDRATE 100 MG/ML
INJECTION, SOLUTION INTRAVENOUS CONTINUOUS PRN
Status: DISCONTINUED | OUTPATIENT
Start: 2024-04-17 | End: 2024-04-19 | Stop reason: HOSPADM

## 2024-04-17 RX ORDER — SODIUM CHLORIDE 9 MG/ML
INJECTION, SOLUTION INTRAVENOUS PRN
Status: DISCONTINUED | OUTPATIENT
Start: 2024-04-17 | End: 2024-04-19 | Stop reason: HOSPADM

## 2024-04-17 RX ORDER — SODIUM CHLORIDE 0.9 % (FLUSH) 0.9 %
5-40 SYRINGE (ML) INJECTION EVERY 12 HOURS SCHEDULED
Status: DISCONTINUED | OUTPATIENT
Start: 2024-04-17 | End: 2024-04-19 | Stop reason: HOSPADM

## 2024-04-17 RX ORDER — SODIUM CHLORIDE 9 MG/ML
INJECTION, SOLUTION INTRAVENOUS CONTINUOUS
Status: DISCONTINUED | OUTPATIENT
Start: 2024-04-17 | End: 2024-04-18

## 2024-04-17 RX ORDER — ONDANSETRON 4 MG/1
4 TABLET, ORALLY DISINTEGRATING ORAL EVERY 8 HOURS PRN
Status: DISCONTINUED | OUTPATIENT
Start: 2024-04-17 | End: 2024-04-19 | Stop reason: HOSPADM

## 2024-04-17 RX ORDER — 0.9 % SODIUM CHLORIDE 0.9 %
500 INTRAVENOUS SOLUTION INTRAVENOUS ONCE
Status: COMPLETED | OUTPATIENT
Start: 2024-04-17 | End: 2024-04-17

## 2024-04-17 RX ORDER — ACETAMINOPHEN 650 MG/1
650 SUPPOSITORY RECTAL EVERY 6 HOURS PRN
Status: DISCONTINUED | OUTPATIENT
Start: 2024-04-17 | End: 2024-04-19 | Stop reason: HOSPADM

## 2024-04-17 RX ADMIN — SODIUM CHLORIDE: 9 INJECTION, SOLUTION INTRAVENOUS at 22:28

## 2024-04-17 RX ADMIN — HYDROMORPHONE HYDROCHLORIDE 0.25 MG: 1 INJECTION, SOLUTION INTRAMUSCULAR; INTRAVENOUS; SUBCUTANEOUS at 23:29

## 2024-04-17 RX ADMIN — HYDRALAZINE HYDROCHLORIDE 25 MG: 25 TABLET ORAL at 22:40

## 2024-04-17 RX ADMIN — SODIUM CHLORIDE, PRESERVATIVE FREE 10 ML: 5 INJECTION INTRAVENOUS at 22:00

## 2024-04-17 RX ADMIN — HYDROMORPHONE HYDROCHLORIDE 0.25 MG: 1 INJECTION, SOLUTION INTRAMUSCULAR; INTRAVENOUS; SUBCUTANEOUS at 19:11

## 2024-04-17 RX ADMIN — SODIUM CHLORIDE 500 ML: 9 INJECTION, SOLUTION INTRAVENOUS at 17:59

## 2024-04-17 ASSESSMENT — PAIN DESCRIPTION - LOCATION: LOCATION: BACK

## 2024-04-17 ASSESSMENT — PAIN DESCRIPTION - DESCRIPTORS: DESCRIPTORS: ACHING;SORE

## 2024-04-17 ASSESSMENT — PAIN SCALES - GENERAL
PAINLEVEL_OUTOF10: 2
PAINLEVEL_OUTOF10: 7
PAINLEVEL_OUTOF10: 7

## 2024-04-17 ASSESSMENT — LIFESTYLE VARIABLES
HOW MANY STANDARD DRINKS CONTAINING ALCOHOL DO YOU HAVE ON A TYPICAL DAY: PATIENT DOES NOT DRINK
HOW OFTEN DO YOU HAVE A DRINK CONTAINING ALCOHOL: NEVER

## 2024-04-17 NOTE — ED NOTES
Report received from LUCIA Castañeda.   Report method in person.     Any medication or safety alerts were reviewed. Any pending diagnostics and notifications were also reviewed, as well as any safety concerns or issues, abnormal labs, abnormal imaging, and abnormal assessment findings. Questions were answered.

## 2024-04-17 NOTE — ED PROVIDER NOTES
[facility]  eMERGENCY dEPARTMENT eNCOUnter   Attending Attestation     Pt Name: Carli Stoddard  MRN: [id number]  Birthdate 1985  Date of evaluation: 4/17/24    History, EXAM, MDM:    Carli Stoddard is a 38 y.o. female who presents with Hypertension and Abnormal Lab    This is a 38-year-old female with with multiple chronic medical additions including type 1 diabetes CKD COPD hypertension hyperlipidemia, sent to the emergency department by her nephrologist worsening renal function uncontrolled blood pressure.  On presentation she is hypertensive, she does not appear volume overloaded, EKG shows no sign of cardiac ischemia lab studies show a white blood cell count of 9 hemoglobin of 9 creatinine 3.7 BUN 46 bicarb 20 BNP elevated troponin 12 renal ultrasound shows no hydronephrosis resident discussed the case with the nephrology service they would like her admitted to the hospital.  Starting on some IV fluids to see if that improves her renal function.    EKG: All EKG's are interpreted by the Emergency Department Physician who either signs or Co-signs this chart in the absence of a cardiologist.  EKG shows a sinus rhythm heart rate of 87  QRS of 74 QTc of 416 no ST segment elevations or depressions no T wave inversions the axis is normal    Vitals:   Vitals:    04/17/24 1944 04/17/24 1959 04/17/24 2013 04/17/24 2100   BP: (!) 149/85 (!) 160/94 (!) 165/95 (!) 161/103   Pulse: 81 91 90 87   Resp: 15 19 13 15   Temp:    98.4 °F (36.9 °C)   TempSrc:    Oral   SpO2: 100% 100% 100% 97%   Weight:    77.1 kg (169 lb 15.6 oz)   Height:         I performed a history and physical examination of the patient and discussed management with the resident. I reviewed the resident’s note and agree with the documented findings and plan of care. Any areas of disagreement are noted on the chart. I was personally present for the key portions of any procedures. I have documented in the chart those procedures where I was not  present during the key portions. I have personally reviewed all images and agree with the resident's interpretation. I have reviewed the emergency nurses triage note. I agree with the chief complaint, past medical history, past surgical history, allergies, medications, social and family history as documented unless otherwise noted below. Documentation of the HPI, Physical Exam and Medical Decision Making performed by medical students or scribes is based on my personal performance of the HPI, PE and MDM. For Phys Assistant/ Nurse Practitioner cases/documentation I have had a face to face evaluation of this patient and have completed at least one if not all key elements of the E/M (history, physical exam, and MDM). Additional findings are as noted.    Milo Lezama MD  Attending Emergency  Physician               Milo Lezama MD  04/17/24 1176

## 2024-04-17 NOTE — ED NOTES
Report given to LUCIA Pacheco from ED.   Report method in person   The following was reviewed with receiving RN:   Current vital signs:  BP (!) 151/85   Pulse 89   Temp 99 °F (37.2 °C) (Oral)   Resp 18   SpO2 100%                MEWS Score: 1     Any medication or safety alerts were reviewed. Any pending diagnostics and notifications were also reviewed, as well as any safety concerns or issues, abnormal labs, abnormal imaging, and abnormal assessment findings. Questions were answered.

## 2024-04-17 NOTE — ED NOTES
Mode of arrival (squad #, walk in, police, etc) : Walk in         Chief complaint(s): abd normal lab, hypertension         Arrival Note (brief scenario, treatment PTA, etc).: Pt was sent in by dr. Santioz for creatinine levels and hypertension.         C= \"Have you ever felt that you should Cut down on your drinking?\"  No  A= \"Have people Annoyed you by criticizing your drinking?\"  No  G= \"Have you ever felt bad or Guilty about your drinking?\"  No  E= \"Have you ever had a drink as an Eye-opener first thing in the morning to steady your nerves or to help a hangover?\"  No      Deferred []      Reason for deferring: N/A    *If yes to two or more: probable alcohol abuse.*

## 2024-04-18 ENCOUNTER — APPOINTMENT (OUTPATIENT)
Age: 39
DRG: 684 | End: 2024-04-18
Attending: INTERNAL MEDICINE
Payer: MEDICARE

## 2024-04-18 ENCOUNTER — APPOINTMENT (OUTPATIENT)
Dept: GENERAL RADIOLOGY | Age: 39
DRG: 684 | End: 2024-04-18
Attending: INTERNAL MEDICINE
Payer: MEDICARE

## 2024-04-18 LAB
ALBUMIN SERPL-MCNC: 2.9 G/DL (ref 3.5–5.2)
ALP SERPL-CCNC: 63 U/L (ref 35–104)
ALT SERPL-CCNC: 7 U/L (ref 5–33)
ANION GAP SERPL CALCULATED.3IONS-SCNC: 8 MMOL/L (ref 9–17)
AST SERPL-CCNC: 9 U/L
BASOPHILS # BLD: 0.1 K/UL (ref 0–0.2)
BASOPHILS NFR BLD: 1 % (ref 0–2)
BILIRUB SERPL-MCNC: 0.2 MG/DL (ref 0.3–1.2)
BUN SERPL-MCNC: 44 MG/DL (ref 6–20)
CALCIUM SERPL-MCNC: 7.6 MG/DL (ref 8.6–10.4)
CHLORIDE SERPL-SCNC: 111 MMOL/L (ref 98–107)
CK SERPL-CCNC: 60 U/L (ref 26–192)
CO2 SERPL-SCNC: 21 MMOL/L (ref 20–31)
CREAT SERPL-MCNC: 3.5 MG/DL (ref 0.5–0.9)
CREAT UR-MCNC: 20.7 MG/DL (ref 28–217)
EOSINOPHIL # BLD: 0.2 K/UL (ref 0–0.4)
EOSINOPHIL,URINE: NORMAL
EOSINOPHILS RELATIVE PERCENT: 3 % (ref 0–4)
ERYTHROCYTE [DISTWIDTH] IN BLOOD BY AUTOMATED COUNT: 13.2 % (ref 11.5–14.9)
GFR SERPL CREATININE-BSD FRML MDRD: 16 ML/MIN/1.73M2
GLUCOSE BLD-MCNC: 118 MG/DL (ref 65–105)
GLUCOSE BLD-MCNC: 147 MG/DL (ref 65–105)
GLUCOSE BLD-MCNC: 188 MG/DL (ref 65–105)
GLUCOSE BLD-MCNC: 223 MG/DL (ref 65–105)
GLUCOSE SERPL-MCNC: 204 MG/DL (ref 70–99)
HCT VFR BLD AUTO: 24 % (ref 36–46)
HGB BLD-MCNC: 7.7 G/DL (ref 12–16)
LYMPHOCYTES NFR BLD: 2.5 K/UL (ref 1–4.8)
LYMPHOCYTES RELATIVE PERCENT: 38 % (ref 24–44)
MCH RBC QN AUTO: 28.3 PG (ref 26–34)
MCHC RBC AUTO-ENTMCNC: 31.9 G/DL (ref 31–37)
MCV RBC AUTO: 88.6 FL (ref 80–100)
MONOCYTES NFR BLD: 0.3 K/UL (ref 0.1–1.3)
MONOCYTES NFR BLD: 5 % (ref 1–7)
NEUTROPHILS NFR BLD: 53 % (ref 36–66)
NEUTS SEG NFR BLD: 3.6 K/UL (ref 1.3–9.1)
PLATELET # BLD AUTO: 249 K/UL (ref 150–450)
PMV BLD AUTO: 7.4 FL (ref 6–12)
POTASSIUM SERPL-SCNC: 4.6 MMOL/L (ref 3.7–5.3)
PROT SERPL-MCNC: 5.8 G/DL (ref 6.4–8.3)
RBC # BLD AUTO: 2.71 M/UL (ref 4–5.2)
SODIUM SERPL-SCNC: 140 MMOL/L (ref 135–144)
SODIUM UR-SCNC: 106 MMOL/L
TOTAL PROTEIN, URINE: 138 MG/DL
URINE TOTAL PROTEIN CREATININE RATIO: 6.67 (ref 0–0.2)
UUN UR-MCNC: 165 MG/DL
WBC OTHER # BLD: 6.7 K/UL (ref 3.5–11)

## 2024-04-18 PROCEDURE — 6360000002 HC RX W HCPCS: Performed by: NURSE PRACTITIONER

## 2024-04-18 PROCEDURE — 93005 ELECTROCARDIOGRAM TRACING: CPT | Performed by: INTERNAL MEDICINE

## 2024-04-18 PROCEDURE — 80053 COMPREHEN METABOLIC PANEL: CPT

## 2024-04-18 PROCEDURE — 82570 ASSAY OF URINE CREATININE: CPT

## 2024-04-18 PROCEDURE — 82947 ASSAY GLUCOSE BLOOD QUANT: CPT

## 2024-04-18 PROCEDURE — 84156 ASSAY OF PROTEIN URINE: CPT

## 2024-04-18 PROCEDURE — 2580000003 HC RX 258: Performed by: NURSE PRACTITIONER

## 2024-04-18 PROCEDURE — 6370000000 HC RX 637 (ALT 250 FOR IP): Performed by: NURSE PRACTITIONER

## 2024-04-18 PROCEDURE — 93306 TTE W/DOPPLER COMPLETE: CPT

## 2024-04-18 PROCEDURE — 36415 COLL VENOUS BLD VENIPUNCTURE: CPT

## 2024-04-18 PROCEDURE — 84540 ASSAY OF URINE/UREA-N: CPT

## 2024-04-18 PROCEDURE — 84300 ASSAY OF URINE SODIUM: CPT

## 2024-04-18 PROCEDURE — 85025 COMPLETE CBC W/AUTO DIFF WBC: CPT

## 2024-04-18 PROCEDURE — 82550 ASSAY OF CK (CPK): CPT

## 2024-04-18 PROCEDURE — 6370000000 HC RX 637 (ALT 250 FOR IP): Performed by: INTERNAL MEDICINE

## 2024-04-18 PROCEDURE — 71045 X-RAY EXAM CHEST 1 VIEW: CPT

## 2024-04-18 PROCEDURE — 87205 SMEAR GRAM STAIN: CPT

## 2024-04-18 PROCEDURE — 99223 1ST HOSP IP/OBS HIGH 75: CPT | Performed by: INTERNAL MEDICINE

## 2024-04-18 PROCEDURE — 2060000000 HC ICU INTERMEDIATE R&B

## 2024-04-18 RX ORDER — INSULIN LISPRO 100 [IU]/ML
0-8 INJECTION, SOLUTION INTRAVENOUS; SUBCUTANEOUS
Status: DISCONTINUED | OUTPATIENT
Start: 2024-04-18 | End: 2024-04-19 | Stop reason: HOSPADM

## 2024-04-18 RX ORDER — FUROSEMIDE 40 MG/1
40 TABLET ORAL DAILY
Status: DISCONTINUED | OUTPATIENT
Start: 2024-04-18 | End: 2024-04-19 | Stop reason: HOSPADM

## 2024-04-18 RX ORDER — HYDRALAZINE HYDROCHLORIDE 50 MG/1
50 TABLET, FILM COATED ORAL 3 TIMES DAILY
Status: DISCONTINUED | OUTPATIENT
Start: 2024-04-18 | End: 2024-04-19 | Stop reason: HOSPADM

## 2024-04-18 RX ORDER — PROCHLORPERAZINE EDISYLATE 5 MG/ML
10 INJECTION INTRAMUSCULAR; INTRAVENOUS EVERY 6 HOURS PRN
Status: DISCONTINUED | OUTPATIENT
Start: 2024-04-18 | End: 2024-04-19 | Stop reason: HOSPADM

## 2024-04-18 RX ORDER — LOSARTAN POTASSIUM 100 MG/1
100 TABLET ORAL DAILY
Status: DISCONTINUED | OUTPATIENT
Start: 2024-04-19 | End: 2024-04-19 | Stop reason: HOSPADM

## 2024-04-18 RX ORDER — INSULIN LISPRO 100 [IU]/ML
0-4 INJECTION, SOLUTION INTRAVENOUS; SUBCUTANEOUS NIGHTLY
Status: DISCONTINUED | OUTPATIENT
Start: 2024-04-18 | End: 2024-04-19 | Stop reason: HOSPADM

## 2024-04-18 RX ADMIN — FUROSEMIDE 40 MG: 40 TABLET ORAL at 09:25

## 2024-04-18 RX ADMIN — HYDRALAZINE HYDROCHLORIDE 25 MG: 25 TABLET ORAL at 08:02

## 2024-04-18 RX ADMIN — ACETAMINOPHEN 650 MG: 325 TABLET ORAL at 18:23

## 2024-04-18 RX ADMIN — HYDROMORPHONE HYDROCHLORIDE 0.25 MG: 1 INJECTION, SOLUTION INTRAMUSCULAR; INTRAVENOUS; SUBCUTANEOUS at 16:29

## 2024-04-18 RX ADMIN — ONDANSETRON 4 MG: 2 INJECTION INTRAMUSCULAR; INTRAVENOUS at 11:49

## 2024-04-18 RX ADMIN — HYDRALAZINE HYDROCHLORIDE 50 MG: 50 TABLET ORAL at 14:12

## 2024-04-18 RX ADMIN — PROCHLORPERAZINE EDISYLATE 10 MG: 5 INJECTION INTRAMUSCULAR; INTRAVENOUS at 20:29

## 2024-04-18 RX ADMIN — SODIUM CHLORIDE, PRESERVATIVE FREE 10 ML: 5 INJECTION INTRAVENOUS at 21:26

## 2024-04-18 RX ADMIN — HYDROMORPHONE HYDROCHLORIDE 0.25 MG: 1 INJECTION, SOLUTION INTRAMUSCULAR; INTRAVENOUS; SUBCUTANEOUS at 03:31

## 2024-04-18 RX ADMIN — HYDROMORPHONE HYDROCHLORIDE 0.25 MG: 1 INJECTION, SOLUTION INTRAMUSCULAR; INTRAVENOUS; SUBCUTANEOUS at 08:02

## 2024-04-18 RX ADMIN — LOSARTAN POTASSIUM 50 MG: 50 TABLET, FILM COATED ORAL at 08:02

## 2024-04-18 RX ADMIN — HYDROMORPHONE HYDROCHLORIDE 0.25 MG: 1 INJECTION, SOLUTION INTRAMUSCULAR; INTRAVENOUS; SUBCUTANEOUS at 11:49

## 2024-04-18 RX ADMIN — ONDANSETRON 4 MG: 2 INJECTION INTRAMUSCULAR; INTRAVENOUS at 18:13

## 2024-04-18 RX ADMIN — HYDROMORPHONE HYDROCHLORIDE 0.25 MG: 1 INJECTION, SOLUTION INTRAMUSCULAR; INTRAVENOUS; SUBCUTANEOUS at 20:29

## 2024-04-18 RX ADMIN — ONDANSETRON 4 MG: 2 INJECTION INTRAMUSCULAR; INTRAVENOUS at 05:36

## 2024-04-18 ASSESSMENT — PAIN SCALES - GENERAL
PAINLEVEL_OUTOF10: 8
PAINLEVEL_OUTOF10: 4
PAINLEVEL_OUTOF10: 8
PAINLEVEL_OUTOF10: 7
PAINLEVEL_OUTOF10: 8
PAINLEVEL_OUTOF10: 3
PAINLEVEL_OUTOF10: 3
PAINLEVEL_OUTOF10: 2
PAINLEVEL_OUTOF10: 4
PAINLEVEL_OUTOF10: 4
PAINLEVEL_OUTOF10: 7

## 2024-04-18 ASSESSMENT — PAIN DESCRIPTION - DESCRIPTORS
DESCRIPTORS: ACHING
DESCRIPTORS: ACHING;SORE
DESCRIPTORS: ACHING
DESCRIPTORS: ACHING;BURNING
DESCRIPTORS: ACHING

## 2024-04-18 ASSESSMENT — PAIN DESCRIPTION - LOCATION
LOCATION: BACK
LOCATION: HEAD

## 2024-04-18 ASSESSMENT — PAIN - FUNCTIONAL ASSESSMENT
PAIN_FUNCTIONAL_ASSESSMENT: PREVENTS OR INTERFERES SOME ACTIVE ACTIVITIES AND ADLS
PAIN_FUNCTIONAL_ASSESSMENT: ACTIVITIES ARE NOT PREVENTED
PAIN_FUNCTIONAL_ASSESSMENT: ACTIVITIES ARE NOT PREVENTED
PAIN_FUNCTIONAL_ASSESSMENT: PREVENTS OR INTERFERES SOME ACTIVE ACTIVITIES AND ADLS

## 2024-04-18 ASSESSMENT — PAIN DESCRIPTION - ORIENTATION
ORIENTATION: LOWER
ORIENTATION: LEFT
ORIENTATION: LEFT

## 2024-04-18 NOTE — PROGRESS NOTES
Pt nauseous and vomiting. Pt received with zofran with no relief. EARNEST Elena NP notified. See new order.

## 2024-04-18 NOTE — CONSULTS
Department of Internal Medicine  Nephrology Carlos Manuel Obrien MD   Consult Note    Reason for consultation: Management of acute kidney injury and chronic kidney disease stage IV.    Consulting physician: Willis Johnson MD.    History of present illness: This is a 38 y.o. 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 Obrien of renal services of [place]], who was sent to the hospital by me yesterday from the office for further evaluation of hyperkalemia uncontrolled hypertension and acute kidney injury.  Serum creatinine at increased to 3.9 mg/dL. She has peripheral edema.  She is currently receiving IV fluid and feels better.  She denies nausea, vomiting or diarrhea. Renal ultrasound performed in the ER showed no hydronephrosis.    Latex, Cinnamon, Iodides, Iodine, Morphine, and Other    Past Medical History:   Diagnosis Date    CKD (chronic kidney disease), stage III (HCC)     Diabetes mellitus type 1 (HCC)     History of anemia     reports prior blood transfusion x 2- is establishing care locally, recent move from [facility]    History of seizures     last time 10 years ago, states related to low blood sugar    Hypertension     Insulin pump in place     Neuropathy     Renal infarct (HCC) 2022    Due to UTI/DKA    Under care of team 12/06/2022    ENDOCRINOLOGY - SEES DRS IN [place] - CHANGING TO LOCAL CARE    Under care of team 12/06/2022    GI - DR. ERAZO - 12/2022    Under care of team 12/06/2022    HEM/ONC - DR. STEVE    Under care of team 11/2022    NEPHROLOGY - To schedule appointment.    Wellness examination 12/06/2022    PCP - JOYCELYN SOLORIO CNP - last visit 11/2022       Scheduled Meds:   insulin lispro  0-8 Units SubCUTAneous TID WC    insulin lispro  0-4 Units SubCUTAneous Nightly

## 2024-04-18 NOTE — H&P
used smokeless tobacco.  Alcohol:      reports no history of alcohol use.  Drug Use:  reports no history of drug use.    Family History:     Family History   Problem Relation Age of Onset    Anemia Mother     Cancer Maternal Grandmother     Anemia Maternal Grandmother     Cancer Maternal Grandfather        Review of Systems:     Positive and Negative as described in HPI.    CONSTITUTIONAL:  negative for fevers, chills, sweats, fatigue, weight loss  HEENT:  negative for vision, hearing changes, runny nose, throat pain  RESPIRATORY:  negative for shortness of breath, cough, congestion, wheezing.  CARDIOVASCULAR:  negative for chest pain, palpitations.  GASTROINTESTINAL:  negative for nausea, vomiting, diarrhea, constipation, change in bowel habits, abdominal pain   GENITOURINARY:  negative for difficulty of urination, burning with urination, frequency   INTEGUMENT:  negative for rash, skin lesions, easy bruising   HEMATOLOGIC/LYMPHATIC:  negative for swelling/edema   ALLERGIC/IMMUNOLOGIC:  negative for urticaria , itching  ENDOCRINE:  negative increase in drinking, increase in urination, hot or cold intolerance  MUSCULOSKELETAL:  negative joint pains, muscle aches, swelling of joints  NEUROLOGICAL:  negative for headaches, dizziness, lightheadedness, numbness, pain, tingling extremities  BEHAVIOR/PSYCH:  negative for depression, anxiety    Physical Exam:   BP (!) 150/97   Pulse 90   Temp 98.4 °F (36.9 °C)   Resp 16   Ht 1.575 m (5' 2\")   Wt 76.7 kg (169 lb)   SpO2 99%   BMI 30.91 kg/m²   Temp (24hrs), Av.3 °F (36.8 °C), Min:97.9 °F (36.6 °C), Max:98.6 °F (37 °C)    Recent Labs     24  2119 24  0623 24  1250 24  1600   POCGLU 120* 188* 223* 147*     No intake or output data in the 24 hours ending 24 1850    General Appearance:  alert, obese, on room air  Mental status: oriented to person, place, and time with normal affect  Head:  normocephalic, atraumatic.  Eye: no icterus,      Patient status Admit as inpatient in the  Progressive Unit/Step down    Acute kidney injury on chronic kidney stage IV with baseline creatinine of 2.5-2.7, secondary to progression of underlying diabetic glomerular nephrosclerosis.  Nephrology consulted, have increased dose of losartan to help with proteinuria, initiated on furosemide 40 mg p.o. daily.  Chronic anemia, stable.  Denies having bleeding  Uncontrolled hypertension, dosing of losartan increased and patient initiated on Lasix.  Will add hydralazine  Diabetes mellitus type 1, on an insulin pump.  Well-controlled with most recent HbA1c of 6.3.  Request to use her own insulin pump    Prognosis appears guarded    Consultations:   IP CONSULT TO NEPHROLOGY  PHARMACY TO DOSE MEDICATION  IP CONSULT TO DIABETES EDUCATOR    Patient is admitted as inpatient status because of co-morbidities listed above, severity of signs and symptoms as outlined, requirement for current medical therapies and most importantly because of direct risk to patient if care not provided in a hospital setting.    Marissa Montano MD  4/18/2024  6:50 PM    Copy sent to Flor [place], APRN - CNP    Please note that this chart was generated using voice recognition Dragon dictation software.  Although every effort was made to ensure the accuracy of this automated transcription, some errors in transcription may have occurred.

## 2024-04-18 NOTE — ED NOTES
TRANSFER - OUT REPORT:    Verbal report given to LUCIA Lyons on Carli Stoddard  being transferred to PCU 2096 for routine progression of patient care       Report consisted of patient's Situation, Background, Assessment and   Recommendations(SBAR).     Information from the following report(s) ED Encounter Summary, ED SBAR, MAR, Recent Results, and Event Log was reviewed with the receiving nurse.    [place] Fall Assessment:    Presents to emergency department  because of falls (Syncope, seizure, or loss of consciousness): No  Age > 70: No  Altered Mental Status, Intoxication with alcohol or substance confusion (Disorientation, impaired judgment, poor safety awaremess, or inability to follow instructions): No  Impaired Mobility: Ambulates or transfers with assistive devices or assistance; Unable to ambulate or transer.: No  Nursing Judgement: No          Lines:   Peripheral IV 04/17/24 Left Antecubital (Active)   Site Assessment Clean, dry & intact 04/17/24 1759   Line Status Blood return noted;Specimen collected;Flushed 04/17/24 1759        Opportunity for questions and clarification was provided.      Patient transported with:  Tech

## 2024-04-18 NOTE — PROGRESS NOTES
Pt has her own insulin pump and dexcom. Message to Dr. Montano to request orders if applicable. MD wishes to hold pump d/t JORGE. Writer spoke with pt and she is reluctant to remove pump d/t hyperglycemia, and also does not want to waste the insulin. Message to MD to advise. Awaiting response.    1543: Ok for pharmacy to dose insulin pump.

## 2024-04-18 NOTE — FLOWSHEET NOTE
04/17/24 2152   Treatment Team Notification   Reason for Communication Review case   Name of Team Member Notified EARNEST Elena NP   Treatment Team Role Advanced Practice Nurse   Method of Communication Secure Message   Response Waiting for response   Notification Time 2153     Pt requesting something stronger for pain. EARNEST Elena NP notified. See new order.

## 2024-04-18 NOTE — PROGRESS NOTES
[facility] Internal Medicine  Tolu Martinez MD; Jhonathan Inman MD; Lan Arreola MD; MD Margot Arauz MD; Karthikeyan Jolly MD  [facility] Internal Medicine   IN-PATIENT SERVICE  [facility]                 Date:   4/18/2024  Patientname:  Carli Stoddard  Date of admission:  4/17/2024  5:12 PM  MRN:   [id number]  Account:  [id number]  YOB: 1985  PCP:    Flor Hoskins APRN - CNP  Room:   2096/2096-01  Code Status:    Full Code      Chief Complaint:     Chief Complaint   Patient presents with    Hypertension    Abnormal Lab       History of Present Illness:     Carli Stoddard is a 38 y.o. Non- / non  female who presents with Hypertension and Abnormal Lab and is admitted to the hospital for the management of Acute kidney injury superimposed on chronic kidney disease (HCC).  Medical history significant for diabetes mellitus type 1, hypertension, hx of renal infarct, CKD stage V and anemia.  She was seen by nephrology earlier today and sent to ER due to increased creatinine.  Creatinine currently 3.7 with baseline of 2.5-2.7.  GFR 15.  Patient admits to not feeling well, fatigue, decreased appetite and bilateral lower extremity swelling.  She also complains of bilateral flank pain that is usually dull but increased over the past week.  Urinalysis negative for infection.  Ultrasound of kidneys unremarkable.  Incidental cyst upper pole of left kidney. BNP 1000.  Unknown baseline, no record of prior echo or cardiology consult.  Patient moved to the [facility] from [place] approximately 1.5 years ago. Chronic anemia, hemoglobin 9.1 stable. Denies fever, chills, chest pain, cough, abdominal pain, vomiting, diarrhea. There are no aggravating or alleviating factors. Symptoms are reported as chronic with acute increase in severity, constant.     Past Medical History:     Past Medical History:   Diagnosis Date    CKD (chronic kidney disease), stage III (HCC)   that this chart was generated using voice recognition Dragon dictation software.  Although every effort was made to ensure the accuracy of this automated transcription, some errors in transcription may [place] [address].   Phone [phone]

## 2024-04-18 NOTE — PROGRESS NOTES
Pharmacy Medication History Note      List of current medications patient is taking is complete.     Source of information: dispense report, OARRS     Changes made to medication list:  Medications flagged for removal (include reason, ex. noncompliance):  None     Medications removed (include reason, ex. therapy complete or physician discontinued):  None     Medications added/doses adjusted:  None     Other notes (ex. Recent course of antibiotics, Coumadin dosing):  OARRS negative     Denies use of other OTC or herbal medications.      Allergies clarified    Medication list provided to the patient: no  Medication education provided to the patient: none    Prior to Admission Medications   Prescriptions Last Dose Informant Patient Reported? Taking?   hydrALAZINE (APRESOLINE) 25 MG tablet 4/17/2024  No Yes   Sig: Take 1 tablet by mouth 3 times daily   insulin aspart (NOVOLOG) 100 UNIT/ML injection vial   Yes No   Sig: Inject 100 Units into the skin 3 times daily             losartan (COZAAR) 50 MG tablet 4/17/2024  No Yes   Sig: Take 1 tablet by mouth daily   sertraline (ZOLOFT) 25 MG tablet   No No   Sig: Take 1 tablet by mouth daily   vitamin D (ERGOCALCIFEROL) 1.25 MG (62882 UT) CAPS capsule   No No   Sig: Take 1 capsule by mouth once a week      Facility-Administered Medications: None           Electronically signed by Nini Cleary RPH on 4/17/2024 at 8:23 PM

## 2024-04-18 NOTE — CARE COORDINATION
Case Management Assessment  Initial Evaluation    Date/Time of Evaluation: 4/18/2024 1:47 PM  Assessment Completed by: Nohemy Guerra RN    If patient is discharged prior to next notation, then this note serves as note for discharge by case management.    Patient Name: Carli Stoddard                   YOB: 1985  Diagnosis: Acute kidney injury superimposed on chronic kidney disease (HCC) [N17.9, N18.9]                   Date / Time: 4/17/2024  5:12 PM    Patient Admission Status: Inpatient   Readmission Risk (Low < 19, Mod (19-27), High > 27): Readmission Risk Score: 13.8    Current PCP: Flor Hoskins APRN - CNP  PCP verified by CM? Yes    Chart Reviewed: Yes      History Provided by: Patient  Patient Orientation: Alert and Oriented    Patient Cognition: Alert    Hospitalization in the last 30 days (Readmission):  No    If yes, Readmission Assessment in CM Navigator will be completed.    Advance Directives:      Code Status: Full Code   Patient's Primary Decision Maker is: Legal Next of Kin      Discharge Planning:    Patient lives with: Spouse/Significant Other Type of Home: House  Primary Care Giver: Self  Patient Support Systems include: Spouse/Significant Other   Current Financial resources: Medicare  Current community resources: None  Current services prior to admission: None            Current DME:              Type of Home Care services:  None    ADLS  Prior functional level: Independent in ADLs/IADLs  Current functional level: Independent in ADLs/IADLs    PT AM-PAC:   /24  OT AM-PAC:   /24    Family can provide assistance at DC: Yes  Would you like Case Management to discuss the discharge plan with any other family members/significant others, and if so, who? Yes (Significant other; Dick)  Plans to Return to Present Housing: Yes  Other Identified Issues/Barriers to RETURNING to current housing: Kidney function.  Potential Assistance needed at discharge: N/A            Potential DME:    Patient

## 2024-04-18 NOTE — PLAN OF CARE
Problem: Discharge Planning  Goal: Discharge to home or other facility with appropriate resources  4/18/2024 1459 by Flavia Ward, RN  Outcome: Progressing     Problem: Pain  Goal: Verbalizes/displays adequate comfort level or baseline comfort level  4/18/2024 1459 by Flavia Ward, RN  Outcome: Progressing     Problem: Chronic Conditions and Co-morbidities  Goal: Patient's chronic conditions and co-morbidity symptoms are monitored and maintained or improved  Outcome: Progressing     Problem: Metabolic/Fluid and Electrolytes - Adult  Goal: Electrolytes maintained within normal limits  Outcome: Progressing  Flowsheets (Taken 4/18/2024 1459)  Electrolytes maintained within normal limits: Monitor labs and assess patient for signs and symptoms of electrolyte imbalances     Problem: Metabolic/Fluid and Electrolytes - Adult  Goal: Hemodynamic stability and optimal renal function maintained  Outcome: Progressing  Flowsheets (Taken 4/18/2024 1459)  Hemodynamic stability and optimal renal function maintained: Monitor labs and assess for signs and symptoms of volume excess or deficit

## 2024-04-18 NOTE — PROGRESS NOTES
Pt c/o of chest pain which precipitates more when touched. Pt expressed pain could be felt while the echo was being done. Dr. Mnotano made aware. Received order for EKG.

## 2024-04-19 VITALS
WEIGHT: 169 LBS | RESPIRATION RATE: 17 BRPM | HEIGHT: 62 IN | TEMPERATURE: 97.8 F | BODY MASS INDEX: 31.1 KG/M2 | OXYGEN SATURATION: 99 % | SYSTOLIC BLOOD PRESSURE: 170 MMHG | DIASTOLIC BLOOD PRESSURE: 96 MMHG | HEART RATE: 110 BPM

## 2024-04-19 LAB
ALBUMIN SERPL-MCNC: 3.1 G/DL (ref 3.5–5.2)
ALP SERPL-CCNC: 56 U/L (ref 35–104)
ALT SERPL-CCNC: 7 U/L (ref 5–33)
ANION GAP SERPL CALCULATED.3IONS-SCNC: 11 MMOL/L (ref 9–17)
AST SERPL-CCNC: 10 U/L
BASOPHILS # BLD: 0 K/UL (ref 0–0.2)
BASOPHILS NFR BLD: 1 % (ref 0–2)
BILIRUB SERPL-MCNC: 0.3 MG/DL (ref 0.3–1.2)
BUN SERPL-MCNC: 37 MG/DL (ref 6–20)
CALCIUM SERPL-MCNC: 8.2 MG/DL (ref 8.6–10.4)
CHLORIDE SERPL-SCNC: 105 MMOL/L (ref 98–107)
CO2 SERPL-SCNC: 21 MMOL/L (ref 20–31)
CREAT SERPL-MCNC: 3.5 MG/DL (ref 0.5–0.9)
ECHO AV AREA PEAK VELOCITY: 2.9 CM2
ECHO AV AREA VTI: 2.9 CM2
ECHO AV AREA/BSA PEAK VELOCITY: 1.6 CM2/M2
ECHO AV AREA/BSA VTI: 1.6 CM2/M2
ECHO AV MEAN GRADIENT: 4 MMHG
ECHO AV MEAN VELOCITY: 0.9 M/S
ECHO AV PEAK GRADIENT: 6 MMHG
ECHO AV PEAK VELOCITY: 1.3 M/S
ECHO AV VELOCITY RATIO: 0.85
ECHO AV VTI: 26.5 CM
ECHO BSA: 1.83 M2
ECHO EST RA PRESSURE: 15 MMHG
ECHO LA AREA 2C: 16.4 CM2
ECHO LA AREA 4C: 14.5 CM2
ECHO LA DIAMETER INDEX: 1.91 CM/M2
ECHO LA DIAMETER: 3.4 CM
ECHO LA MAJOR AXIS: 5.5 CM
ECHO LA MINOR AXIS: 5.1 CM
ECHO LA VOL BP: 38 ML (ref 22–52)
ECHO LA VOL MOD A2C: 43 ML (ref 22–52)
ECHO LA VOL MOD A4C: 31 ML (ref 22–52)
ECHO LA VOL/BSA BIPLANE: 21 ML/M2 (ref 16–34)
ECHO LA VOLUME INDEX MOD A2C: 24 ML/M2 (ref 16–34)
ECHO LA VOLUME INDEX MOD A4C: 17 ML/M2 (ref 16–34)
ECHO LV E' LATERAL VELOCITY: 15 CM/S
ECHO LV E' SEPTAL VELOCITY: 10 CM/S
ECHO LV FRACTIONAL SHORTENING: 34 % (ref 28–44)
ECHO LV INTERNAL DIMENSION DIASTOLE INDEX: 2.3 CM/M2
ECHO LV INTERNAL DIMENSION DIASTOLIC: 4.1 CM (ref 3.9–5.3)
ECHO LV INTERNAL DIMENSION SYSTOLIC INDEX: 1.52 CM/M2
ECHO LV INTERNAL DIMENSION SYSTOLIC: 2.7 CM
ECHO LV IVSD: 1.1 CM (ref 0.6–0.9)
ECHO LV MASS 2D: 151.3 G (ref 67–162)
ECHO LV MASS INDEX 2D: 85 G/M2 (ref 43–95)
ECHO LV POSTERIOR WALL DIASTOLIC: 1.1 CM (ref 0.6–0.9)
ECHO LV RELATIVE WALL THICKNESS RATIO: 0.54
ECHO LVOT AREA: 3.5 CM2
ECHO LVOT AV VTI INDEX: 0.82
ECHO LVOT DIAM: 2.1 CM
ECHO LVOT MEAN GRADIENT: 2 MMHG
ECHO LVOT PEAK GRADIENT: 4 MMHG
ECHO LVOT PEAK VELOCITY: 1.1 M/S
ECHO LVOT STROKE VOLUME INDEX: 42.4 ML/M2
ECHO LVOT SV: 75.5 ML
ECHO LVOT VTI: 21.8 CM
ECHO MV A VELOCITY: 0.68 M/S
ECHO MV E DECELERATION TIME (DT): 169 MS
ECHO MV E VELOCITY: 0.95 M/S
ECHO MV E/A RATIO: 1.4
ECHO MV E/E' LATERAL: 6.33
ECHO MV E/E' RATIO (AVERAGED): 7.92
ECHO RA AREA 4C: 12.7 CM2
ECHO RA END SYSTOLIC VOLUME APICAL 4 CHAMBER INDEX BSA: 16 ML/M2
ECHO RA VOLUME: 29 ML
ECHO RIGHT VENTRICULAR SYSTOLIC PRESSURE (RVSP): 30 MMHG
ECHO RV TAPSE: 2.5 CM (ref 1.7–?)
ECHO TV REGURGITANT MAX VELOCITY: 1.96 M/S
ECHO TV REGURGITANT PEAK GRADIENT: 15 MMHG
EOSINOPHIL # BLD: 0.1 K/UL (ref 0–0.4)
EOSINOPHILS RELATIVE PERCENT: 2 % (ref 0–4)
ERYTHROCYTE [DISTWIDTH] IN BLOOD BY AUTOMATED COUNT: 13.2 % (ref 11.5–14.9)
EST. AVERAGE GLUCOSE BLD GHB EST-MCNC: 143 MG/DL
GFR SERPL CREATININE-BSD FRML MDRD: 16 ML/MIN/1.73M2
GLUCOSE BLD-MCNC: 209 MG/DL (ref 65–105)
GLUCOSE BLD-MCNC: 295 MG/DL (ref 65–105)
GLUCOSE SERPL-MCNC: 242 MG/DL (ref 70–99)
HBA1C MFR BLD: 6.6 % (ref 4–6)
HCT VFR BLD AUTO: 26.6 % (ref 36–46)
HGB BLD-MCNC: 8.8 G/DL (ref 12–16)
LYMPHOCYTES NFR BLD: 1.9 K/UL (ref 1–4.8)
LYMPHOCYTES RELATIVE PERCENT: 30 % (ref 24–44)
MAGNESIUM SERPL-MCNC: 1.9 MG/DL (ref 1.6–2.6)
MCH RBC QN AUTO: 28.9 PG (ref 26–34)
MCHC RBC AUTO-ENTMCNC: 33.1 G/DL (ref 31–37)
MCV RBC AUTO: 87.5 FL (ref 80–100)
MONOCYTES NFR BLD: 0.3 K/UL (ref 0.1–1.3)
MONOCYTES NFR BLD: 5 % (ref 1–7)
NEUTROPHILS NFR BLD: 62 % (ref 36–66)
NEUTS SEG NFR BLD: 4 K/UL (ref 1.3–9.1)
PLATELET # BLD AUTO: 289 K/UL (ref 150–450)
PMV BLD AUTO: 7.8 FL (ref 6–12)
POTASSIUM SERPL-SCNC: 4.5 MMOL/L (ref 3.7–5.3)
PROT SERPL-MCNC: 6.1 G/DL (ref 6.4–8.3)
RBC # BLD AUTO: 3.04 M/UL (ref 4–5.2)
SODIUM SERPL-SCNC: 137 MMOL/L (ref 135–144)
WBC OTHER # BLD: 6.4 K/UL (ref 3.5–11)

## 2024-04-19 PROCEDURE — 6360000002 HC RX W HCPCS: Performed by: NURSE PRACTITIONER

## 2024-04-19 PROCEDURE — 99239 HOSP IP/OBS DSCHRG MGMT >30: CPT | Performed by: INTERNAL MEDICINE

## 2024-04-19 PROCEDURE — 6370000000 HC RX 637 (ALT 250 FOR IP): Performed by: INTERNAL MEDICINE

## 2024-04-19 PROCEDURE — 85025 COMPLETE CBC W/AUTO DIFF WBC: CPT

## 2024-04-19 PROCEDURE — 82947 ASSAY GLUCOSE BLOOD QUANT: CPT

## 2024-04-19 PROCEDURE — 83036 HEMOGLOBIN GLYCOSYLATED A1C: CPT

## 2024-04-19 PROCEDURE — 80053 COMPREHEN METABOLIC PANEL: CPT

## 2024-04-19 PROCEDURE — 36415 COLL VENOUS BLD VENIPUNCTURE: CPT

## 2024-04-19 PROCEDURE — 83735 ASSAY OF MAGNESIUM: CPT

## 2024-04-19 PROCEDURE — 2580000003 HC RX 258: Performed by: NURSE PRACTITIONER

## 2024-04-19 RX ORDER — FUROSEMIDE 40 MG/1
40 TABLET ORAL DAILY
Qty: 60 TABLET | Refills: 3 | Status: SHIPPED | OUTPATIENT
Start: 2024-04-20

## 2024-04-19 RX ORDER — CARVEDILOL 3.12 MG/1
3.12 TABLET ORAL 2 TIMES DAILY
Qty: 60 TABLET | Refills: 3 | Status: SHIPPED | OUTPATIENT
Start: 2024-04-19

## 2024-04-19 RX ORDER — CARVEDILOL 3.12 MG/1
3.12 TABLET ORAL 2 TIMES DAILY WITH MEALS
Status: DISCONTINUED | OUTPATIENT
Start: 2024-04-19 | End: 2024-04-19 | Stop reason: HOSPADM

## 2024-04-19 RX ORDER — LOSARTAN POTASSIUM 50 MG/1
100 TABLET ORAL DAILY
Qty: 30 TABLET | Refills: 5 | Status: SHIPPED | OUTPATIENT
Start: 2024-04-19

## 2024-04-19 RX ORDER — HYDRALAZINE HYDROCHLORIDE 25 MG/1
50 TABLET, FILM COATED ORAL 3 TIMES DAILY
Qty: 270 TABLET | Refills: 3 | Status: SHIPPED | OUTPATIENT
Start: 2024-04-19

## 2024-04-19 RX ADMIN — SODIUM CHLORIDE, PRESERVATIVE FREE 10 ML: 5 INJECTION INTRAVENOUS at 09:16

## 2024-04-19 RX ADMIN — HYDROMORPHONE HYDROCHLORIDE 0.25 MG: 1 INJECTION, SOLUTION INTRAMUSCULAR; INTRAVENOUS; SUBCUTANEOUS at 04:36

## 2024-04-19 RX ADMIN — PROCHLORPERAZINE EDISYLATE 10 MG: 5 INJECTION INTRAMUSCULAR; INTRAVENOUS at 04:35

## 2024-04-19 RX ADMIN — HYDRALAZINE HYDROCHLORIDE 50 MG: 50 TABLET ORAL at 09:17

## 2024-04-19 RX ADMIN — HYDRALAZINE HYDROCHLORIDE 50 MG: 50 TABLET ORAL at 13:19

## 2024-04-19 RX ADMIN — LOSARTAN POTASSIUM 100 MG: 100 TABLET, FILM COATED ORAL at 09:17

## 2024-04-19 RX ADMIN — HYDROMORPHONE HYDROCHLORIDE 0.25 MG: 1 INJECTION, SOLUTION INTRAMUSCULAR; INTRAVENOUS; SUBCUTANEOUS at 09:15

## 2024-04-19 RX ADMIN — ONDANSETRON 4 MG: 2 INJECTION INTRAMUSCULAR; INTRAVENOUS at 13:22

## 2024-04-19 RX ADMIN — FUROSEMIDE 40 MG: 40 TABLET ORAL at 09:17

## 2024-04-19 RX ADMIN — HYDROMORPHONE HYDROCHLORIDE 0.25 MG: 1 INJECTION, SOLUTION INTRAMUSCULAR; INTRAVENOUS; SUBCUTANEOUS at 13:22

## 2024-04-19 ASSESSMENT — PAIN DESCRIPTION - DESCRIPTORS
DESCRIPTORS: ACHING;STABBING
DESCRIPTORS: ACHING
DESCRIPTORS: ACHING

## 2024-04-19 ASSESSMENT — PAIN SCALES - GENERAL
PAINLEVEL_OUTOF10: 7
PAINLEVEL_OUTOF10: 3
PAINLEVEL_OUTOF10: 5
PAINLEVEL_OUTOF10: 7
PAINLEVEL_OUTOF10: 4
PAINLEVEL_OUTOF10: 3
PAINLEVEL_OUTOF10: 7

## 2024-04-19 ASSESSMENT — PAIN DESCRIPTION - LOCATION
LOCATION: BACK

## 2024-04-19 ASSESSMENT — PAIN DESCRIPTION - ORIENTATION: ORIENTATION: LOWER

## 2024-04-19 ASSESSMENT — PAIN - FUNCTIONAL ASSESSMENT
PAIN_FUNCTIONAL_ASSESSMENT: ACTIVITIES ARE NOT PREVENTED

## 2024-04-19 NOTE — PROGRESS NOTES
Department of Internal Medicine  Nephrology Lalit Johns MD   progress note    Reason for consultation: Management of acute kidney injury and chronic kidney disease stage IV.    Consulting physician: Willis Johnson MD.    Subjective/interval history.  Patient seen and examined  Patient denies any complaints today some weakness mild fatigue serum creatinine 3.5 mg/dL  No chest pain today  Patient wants to go home  History of present illness: This is a 38 y.o. 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 Obrien of renal services of [place]], who was sent to the hospital by me yesterday from the office for further evaluation of hyperkalemia uncontrolled hypertension and acute kidney injury.  Serum creatinine at increased to 3.9 mg/dL. She has peripheral edema.  She is currently receiving IV fluid and feels better.  She denies nausea, vomiting or diarrhea. Renal ultrasound performed in the ER showed no hydronephrosis.    Latex, Cinnamon, Iodides, Iodine, Morphine, and Other    Past Medical History:   Diagnosis Date    CKD (chronic kidney disease), stage III (HCC)     Diabetes mellitus type 1 (HCC)     History of anemia     reports prior blood transfusion x 2- is establishing care locally, recent move from [facility]    History of seizures     last time 10 years ago, states related to low blood sugar    Hypertension     Insulin pump in place     Neuropathy     Renal infarct (HCC) 2022    Due to UTI/DKA    Under care of team 12/06/2022    ENDOCRINOLOGY - SEES DRS IN [place] - CHANGING TO LOCAL CARE    Under care of team 12/06/2022    GI - DR. ERAZO - 12/2022    Under care of team 12/06/2022    HEM/ONC - DR. STEVE    Under care of team 11/2022    NEPHROLOGY - To schedule appointment.     output data in the 24 hours ending 04/19/24 1825  Constitutional: alert, appears stated age, and cooperative    Skin: Skin color, texture, turgor normal. No rashes or lesions    Head: Normocephalic, without obvious abnormality, atraumatic     ENT:  no epistaxis or rhinorrhea.    Neck:   Supple with no jugular venous distention    Cardiovascular/Edema: regular rate and rhythm, S1, S2 normal, no murmur, click, rub or gallop    Respiratory: Lungs: clear to auscultation bilaterally    Abdomen: soft, non-tender; bowel sounds normal; no masses,  no organomegaly    Back: symmetric, no curvature. ROM normal. No CVA tenderness.    Extremities: edema +    Neuro:  Grossly normal      CBC:   Recent Labs     04/17/24  1750 04/18/24  0604 04/19/24  0532   WBC 9.0 6.7 6.4   HGB 9.1* 7.7* 8.8*    249 289     BMP:    Recent Labs     04/17/24  1750 04/18/24  0604 04/19/24  0532    140 137   K 4.9 4.6 4.5    111* 105   CO2 20 21 21   BUN 46* 44* 37*   CREATININE 3.7* 3.5* 3.5*   GLUCOSE 379* 204* 242*       Lab Results   Component Value Date/Time    NITRU NEGATIVE 04/17/2024 05:50 PM    COLORU Yellow 04/17/2024 05:50 PM    PHUR 6.5 04/17/2024 05:50 PM    LABCAST 10-20 Hyaline 07/22/2016 01:30 AM    WBCUA 0 TO 2 04/17/2024 05:50 PM    RBCUA 0 TO 2 04/17/2024 05:50 PM    MUCUS 1+ 11/04/2022 02:11 AM    YEAST Present 07/22/2016 01:30 AM    BACTERIA None 04/17/2024 05:50 PM    CLARITYU CLOUDY 07/22/2016 01:30 AM    SPECGRAV 1.012 04/17/2024 05:50 PM    LEUKOCYTESUR NEGATIVE 04/17/2024 05:50 PM    UROBILINOGEN Normal 04/17/2024 05:50 PM    BILIRUBINUR NEGATIVE 04/17/2024 05:50 PM    BLOODU LARGE 07/22/2016 01:30 AM    GLUCOSEU LARGE 04/17/2024 05:50 PM    KETUA NEGATIVE 04/17/2024 05:50 PM     Urine Sodium:     Lab Results   Component Value Date/Time    GAMA 106 04/18/2024 02:10 PM     Urine Potassium:    Lab Results   Component Value Date/Time    KUR 7.6 11/05/2022 09:40 PM     Urine Chloride:    Lab Results   Component

## 2024-04-19 NOTE — PROGRESS NOTES
Pt educated on need to follow up with nephrology. Pt and S/O verbalize understanding. Pt and S/O also verbalize understanding of discharge instruction, new medication doses, times, indications, and possible side effects.

## 2024-04-19 NOTE — PROGRESS NOTES
[facility]   IN-PATIENT SERVICE   [facility]    HISTORY AND PHYSICAL EXAMINATION            Date:   4/19/2024  Patient name:  Carli Stoddard  Date of admission:  4/17/2024  5:12 PM  MRN:   [id number]  Account:  [id number]  YOB: 1985  PCP:    Flor Hoskins APRN - CNP  Room:   [address] [place] [address]  Code Status:    Full Code    Chief Complaint:     Chief Complaint   Patient presents with    Hypertension    Abnormal Lab       History Obtained From:     patient    History of Present Illness:     Carli Stoddard is a 38 y.o. Non- / non  female who presents with Hypertension and Abnormal Lab and is admitted to the hospital for the management of Acute kidney injury superimposed on chronic kidney disease (HCC).  Medical history significant for diabetes mellitus type 1, hypertension, hx of renal infarct, CKD stage V and anemia.  She was seen by nephrology earlier today and sent to ER due to increased creatinine.  Creatinine currently 3.7 with baseline of 2.5-2.7.  GFR 15.  Patient admits to not feeling well, fatigue, decreased appetite and bilateral lower extremity swelling.  She also complains of bilateral flank pain that is usually dull but increased over the past week.  Urinalysis negative for infection.  Ultrasound of kidneys unremarkable.  Incidental cyst upper pole of left kidney. BNP 1000.  Unknown baseline, no record of prior echo or cardiology consult.  Patient moved to the [facility] from [place] approximately 1.5 years ago. Chronic anemia, hemoglobin 9.1 stable. Denies fever, chills, chest pain, cough, abdominal pain, vomiting, diarrhea. There are no aggravating or alleviating factors. Symptoms are reported as chronic with acute increase in severity, constant.     04/18/2024  When seen today the patient reports feeling better.    Past Medical History:     Past Medical History:   Diagnosis Date    CKD (chronic kidney disease), stage III (HCC)     Diabetes mellitus type 1  Anion Gap 11 9 - 17 mmol/L    Glucose 242 (H) 70 - 99 mg/dL    BUN 37 (H) 6 - 20 mg/dL    Creatinine 3.5 (H) 0.5 - 0.9 mg/dL    Est, Glom Filt Rate 16 (L) >60 mL/min/1.73m2    Calcium 8.2 (L) 8.6 - 10.4 mg/dL    Total Protein 6.1 (L) 6.4 - 8.3 g/dL    Albumin 3.1 (L) 3.5 - 5.2 g/dL    Total Bilirubin 0.3 0.3 - 1.2 mg/dL    Alkaline Phosphatase 56 35 - 104 U/L    ALT 7 5 - 33 U/L    AST 10 <32 U/L   CBC with Auto Differential    Collection Time: 04/19/24  5:32 AM   Result Value Ref Range    WBC 6.4 3.5 - 11.0 k/uL    RBC 3.04 (L) 4.0 - 5.2 m/uL    Hemoglobin 8.8 (L) 12.0 - 16.0 g/dL    Hematocrit 26.6 (L) 36 - 46 %    MCV 87.5 80 - 100 fL    MCH 28.9 26 - 34 pg    MCHC 33.1 31 - 37 g/dL    RDW 13.2 11.5 - 14.9 %    Platelets 289 150 - 450 k/uL    MPV 7.8 6.0 - 12.0 fL    Neutrophils % 62 36 - 66 %    Lymphocytes % 30 24 - 44 %    Monocytes % 5 1 - 7 %    Eosinophils % 2 0 - 4 %    Basophils % 1 0 - 2 %    Neutrophils Absolute 4.00 1.3 - 9.1 k/uL    Lymphocytes Absolute 1.90 1.0 - 4.8 k/uL    Monocytes Absolute 0.30 0.1 - 1.3 k/uL    Eosinophils Absolute 0.10 0.0 - 0.4 k/uL    Basophils Absolute 0.00 0.0 - 0.2 k/uL   Magnesium    Collection Time: 04/19/24  5:32 AM   Result Value Ref Range    Magnesium 1.9 1.6 - 2.6 mg/dL   POC Glucose Fingerstick    Collection Time: 04/19/24  6:08 AM   Result Value Ref Range    POC Glucose 295 (H) 65 - 105 mg/dL       Imaging/Diagnostics:    Reviewed    Assessment :      Primary Problem  Acute kidney injury superimposed on chronic kidney disease (HCC)    Active Hospital Problems    Diagnosis Date Noted    Acute kidney injury superimposed on chronic kidney disease (HCC) [N17.9, N18.9] 04/17/2024       Plan:     Patient status Admit as inpatient in the  Progressive Unit/Step down    Acute kidney injury on chronic kidney stage IV with baseline creatinine of 2.5-2.7, secondary to progression of underlying diabetic glomerular nephrosclerosis.  Nephrology consulted, have increased dose of

## 2024-04-19 NOTE — CARE COORDINATION
ONGOING DISCHARGE PLAN:    Patient is alert and oriented x4.    Spoke with patient regarding discharge plan and patient confirms that plan is still to return to home w/ SO.     Denies VNS at this time.     Cr today 3.5, BUN 37, HGB 8.8.     Nephro on board. Pt. Is PD Cath Placement. Surgery has been consulted. Cardio has been consulted for Clearance.       Will continue to follow for additional discharge needs.    If patient is discharged prior to next notation, then this note serves as note for discharge by case management.    Electronically signed by Alma Hanley RN on 4/19/2024 at 3:42 PM

## 2024-04-19 NOTE — PLAN OF CARE
Problem: Discharge Planning  Goal: Discharge to home or other facility with appropriate resources  Outcome: Progressing     Problem: Pain  Goal: Verbalizes/displays adequate comfort level or baseline comfort level  Outcome: Progressing     Problem: Metabolic/Fluid and Electrolytes - Adult  Goal: Electrolytes maintained within normal limits  Outcome: Progressing

## 2024-04-19 NOTE — PROGRESS NOTES
Dr Goodwin notified of consult for surgical clearance.  Dr Jordan notified of surgical consult for placement of peritoneal dialysis cath.

## 2024-04-19 NOTE — PROGRESS NOTES
Pt states she does not feel comfortable with peritoneal dialysis at this time. States she would like to be discharged. Writer notified Dr Johns and Dr Stern. Dr Johns returned call, states pt can follow up out pt and is ok to discharge today.   oral

## 2024-04-19 NOTE — PLAN OF CARE
Problem: Discharge Planning  Goal: Discharge to home or other facility with appropriate resources  4/19/2024 1616 by Tonya Hopkins RN  Outcome: Adequate for Discharge  4/19/2024 1616 by Tonya Hopkins RN  Outcome: Progressing  Flowsheets (Taken 4/19/2024 1000)  Discharge to home or other facility with appropriate resources:   Identify barriers to discharge with patient and caregiver   Arrange for needed discharge resources and transportation as appropriate   Refer to discharge planning if patient needs post-hospital services based on physician order or complex needs related to functional status, cognitive ability or social support system  4/19/2024 0539 by Eloy Bowden RN  Outcome: Progressing     Problem: Pain  Goal: Verbalizes/displays adequate comfort level or baseline comfort level  4/19/2024 1616 by Tonya Hopkins RN  Outcome: Adequate for Discharge  4/19/2024 1616 by Tonya Hopkins RN  Outcome: Progressing  4/19/2024 0539 by Eloy Bowden RN  Outcome: Progressing     Problem: Chronic Conditions and Co-morbidities  Goal: Patient's chronic conditions and co-morbidity symptoms are monitored and maintained or improved  4/19/2024 1616 by Tonya Hopkins RN  Outcome: Adequate for Discharge  4/19/2024 1616 by Tonya Hopkins RN  Outcome: Progressing  Flowsheets (Taken 4/19/2024 1000)  Care Plan - Patient's Chronic Conditions and Co-Morbidity Symptoms are Monitored and Maintained or Improved:   Monitor and assess patient's chronic conditions and comorbid symptoms for stability, deterioration, or improvement   Collaborate with multidisciplinary team to address chronic and comorbid conditions and prevent exacerbation or deterioration   Update acute care plan with appropriate goals if chronic or comorbid symptoms are exacerbated and prevent overall improvement and discharge     Problem: Metabolic/Fluid and Electrolytes - Adult  Goal: Electrolytes maintained within normal limits  4/19/2024 1616  by Tonya Hopkins RN  Outcome: Adequate for Discharge  4/19/2024 1616 by Tonya Hopkins RN  Outcome: Progressing  Flowsheets (Taken 4/19/2024 1000)  Electrolytes maintained within normal limits: Monitor labs and assess patient for signs and symptoms of electrolyte imbalances  4/19/2024 0539 by Eloy Bowden RN  Outcome: Progressing  Goal: Hemodynamic stability and optimal renal function maintained  4/19/2024 1616 by Tonya Hopkins RN  Outcome: Adequate for Discharge  4/19/2024 1616 by Tonya Hopkins RN  Outcome: Progressing  Flowsheets (Taken 4/19/2024 1000)  Hemodynamic stability and optimal renal function maintained:   Monitor labs and assess for signs and symptoms of volume excess or deficit   Monitor intake, output and patient weight   Instruct patient on fluid and nutrition restrictions as appropriate

## 2024-04-19 NOTE — PROGRESS NOTES
CLINICAL PHARMACY NOTE: MEDS TO BEDS    Total # of Prescriptions Filled: 3   The following medications were delivered to the patient:  Furosemide 40mg  Hydralazine 25mg (only #40 in stock to get pt started can transfer remainder to local pharmacy)  Carvedilol 3.125mg    Additional Documentation: p/u at Outpt Pharm by pt and family 4/19/24 4:50pm kbg    -Losartan 50mg on hold pt has at home local pharmacy can call to transfer from [facility] [phone] [address] (Outpatient Surgery entrance- [facility] /[address] entrance) Open Monday-Friday 9:00am-5:30pm closed from 1:00pm-1:30pm for lunch. Closed weekends and major holidays.

## 2024-04-19 NOTE — PLAN OF CARE
Problem: Discharge Planning  Goal: Discharge to home or other facility with appropriate resources  4/19/2024 1616 by Tonya Hopkins RN  Outcome: Progressing  Flowsheets (Taken 4/19/2024 1000)  Discharge to home or other facility with appropriate resources:   Identify barriers to discharge with patient and caregiver   Arrange for needed discharge resources and transportation as appropriate   Refer to discharge planning if patient needs post-hospital services based on physician order or complex needs related to functional status, cognitive ability or social support system  4/19/2024 0539 by Eloy Bowden RN  Outcome: Progressing     Problem: Pain  Goal: Verbalizes/displays adequate comfort level or baseline comfort level  4/19/2024 1616 by Tonya Hopkins RN  Outcome: Progressing  4/19/2024 0539 by Eloy Bowden RN  Outcome: Progressing     Problem: Chronic Conditions and Co-morbidities  Goal: Patient's chronic conditions and co-morbidity symptoms are monitored and maintained or improved  Outcome: Progressing  Flowsheets (Taken 4/19/2024 1000)  Care Plan - Patient's Chronic Conditions and Co-Morbidity Symptoms are Monitored and Maintained or Improved:   Monitor and assess patient's chronic conditions and comorbid symptoms for stability, deterioration, or improvement   Collaborate with multidisciplinary team to address chronic and comorbid conditions and prevent exacerbation or deterioration   Update acute care plan with appropriate goals if chronic or comorbid symptoms are exacerbated and prevent overall improvement and discharge     Problem: Metabolic/Fluid and Electrolytes - Adult  Goal: Electrolytes maintained within normal limits  4/19/2024 1616 by Tonya Hopkins RN  Outcome: Progressing  Flowsheets (Taken 4/19/2024 1000)  Electrolytes maintained within normal limits: Monitor labs and assess patient for signs and symptoms of electrolyte imbalances  4/19/2024 0539 by Eloy Bowden RN  Outcome:  Progressing  Goal: Hemodynamic stability and optimal renal function maintained  Outcome: Progressing  Flowsheets (Taken 4/19/2024 1000)  Hemodynamic stability and optimal renal function maintained:   Monitor labs and assess for signs and symptoms of volume excess or deficit   Monitor intake, output and patient weight   Instruct patient on fluid and nutrition restrictions as appropriate

## 2024-04-20 LAB
EKG ATRIAL RATE: 88 BPM
EKG P AXIS: 100 DEGREES
EKG P-R INTERVAL: 132 MS
EKG Q-T INTERVAL: 344 MS
EKG QRS DURATION: 68 MS
EKG QTC CALCULATION (BAZETT): 416 MS
EKG R AXIS: 117 DEGREES
EKG T AXIS: 108 DEGREES
EKG VENTRICULAR RATE: 88 BPM

## 2024-04-20 PROCEDURE — 93010 ELECTROCARDIOGRAM REPORT: CPT | Performed by: INTERNAL MEDICINE

## 2024-04-21 LAB
EKG ATRIAL RATE: 87 BPM
EKG P AXIS: 57 DEGREES
EKG P-R INTERVAL: 132 MS
EKG Q-T INTERVAL: 346 MS
EKG QRS DURATION: 74 MS
EKG QTC CALCULATION (BAZETT): 416 MS
EKG R AXIS: 26 DEGREES
EKG T AXIS: 33 DEGREES
EKG VENTRICULAR RATE: 87 BPM

## 2024-04-21 NOTE — DISCHARGE SUMMARY
IN-PATIENT SERVICE   [facility]    Discharge Summary     Patient ID: Carli Stoddard  :  1985   MRN: [id number]     ACCOUNT:  [id number]   Patient's PCP: Flor Hoskins APRN - CNP  Admit Date: 2024   Discharge Date: 2024    Length of Stay: 2  Code Status:  Prior  Admitting Physician: Willis Johnson MD  Discharge Physician: Kristi Stern MD     Active Discharge Diagnoses:     Primary Problem  Acute kidney injury superimposed on chronic kidney disease (HCC)      Hospital Problems  Active Hospital Problems    Diagnosis Date Noted    Acute kidney injury superimposed on chronic kidney disease (HCC) [N17.9, N18.9] 2024       Admission Condition:  fair     Discharged Condition: fair    Hospital Stay:     Hospital Course:  Carli Stoddard is a 38 y.o. female who was admitted for the management of Acute kidney injury superimposed on chronic kidney disease (HCC) , presented to ER with Hypertension and Abnormal Lab    Carli Stoddard is a 38 y.o. Non- / non  female who presents with Hypertension and Abnormal Lab and is admitted to the hospital for the management of Acute kidney injury superimposed on chronic kidney disease (HCC). Medical history significant for diabetes mellitus type 1, hypertension, hx of renal infarct, CKD stage V and anemia. She was seen by nephrology earlier today and sent to ER due to increased creatinine. Creatinine currently 3.7 with baseline of 2.5-2.7. GFR 15. Patient admits to not feeling well, fatigue, decreased appetite and bilateral lower extremity swelling. She also complains of bilateral flank pain that is usually dull but increased over the past week. Urinalysis negative for infection. Ultrasound of kidneys unremarkable. Incidental cyst upper pole of left kidney. BNP 1000. Unknown baseline, no record of prior echo or cardiology consult. [facility] [place] approximately 1.5 years ago. Chronic anemia,

## 2024-04-22 ENCOUNTER — TELEPHONE (OUTPATIENT)
Dept: PRIMARY CARE CLINIC | Age: 39
End: 2024-04-22

## 2024-04-22 NOTE — TELEPHONE ENCOUNTER
Care Transitions Initial Follow Up Call    Outreach made within 2 business days of discharge: Yes    Patient: Tori Stoddard Patient : 1985   MRN: [id number]  Reason for Admission: There are no discharge diagnoses documented for the most recent discharge.  Discharge Date: 24       Spoke with: tori stoddard     Discharge department/facility: [facility] Interactive Patient Contact:  Was patient able to fill all prescriptions: Yes  Was patient instructed to bring all medications to the follow-up visit: Yes  Is patient taking all medications as directed in the discharge summary? Yes  Does patient understand their discharge instructions: Yes  Does patient have questions or concerns that need addressed prior to 7-14 day follow up office visit: no    Scheduled appointment with PCP within 7-14 days    Follow Up  Future Appointments   Date Time Provider Department Center   2024  8:15 AM Flor Hoskins APRN - CNP [facility] [place]   2024  1:00 PM Renea Paul LISW [facility] [place]   2024 10:30 AM Carlos Manuel Obrien MD [facility]       Doris Armstrong MA

## 2024-04-26 ENCOUNTER — TELEPHONE (OUTPATIENT)
Age: 39
End: 2024-04-26

## 2024-04-26 NOTE — TELEPHONE ENCOUNTER
Patient called and scheduled an office visit.   Referral in chart  Patient was driving and unable to confirm insurance . Informed patient to bring insurance card and ID to office , and an up to date medication list to her office visit.

## 2024-04-30 ENCOUNTER — OFFICE VISIT (OUTPATIENT)
Dept: PRIMARY CARE CLINIC | Age: 39
End: 2024-04-30
Payer: COMMERCIAL

## 2024-04-30 VITALS
WEIGHT: 169.4 LBS | BODY MASS INDEX: 31.17 KG/M2 | DIASTOLIC BLOOD PRESSURE: 88 MMHG | OXYGEN SATURATION: 100 % | SYSTOLIC BLOOD PRESSURE: 131 MMHG | HEIGHT: 62 IN | HEART RATE: 80 BPM

## 2024-04-30 DIAGNOSIS — E10.22 TYPE 1 DIABETES MELLITUS WITH STAGE 3A CHRONIC KIDNEY DISEASE (HCC): Primary | ICD-10-CM

## 2024-04-30 DIAGNOSIS — N18.31 TYPE 1 DIABETES MELLITUS WITH STAGE 3A CHRONIC KIDNEY DISEASE (HCC): Primary | ICD-10-CM

## 2024-04-30 DIAGNOSIS — Z09 HOSPITAL DISCHARGE FOLLOW-UP: ICD-10-CM

## 2024-04-30 PROCEDURE — 1111F DSCHRG MED/CURRENT MED MERGE: CPT | Performed by: NURSE PRACTITIONER

## 2024-04-30 PROCEDURE — 99213 OFFICE O/P EST LOW 20 MIN: CPT | Performed by: NURSE PRACTITIONER

## 2024-04-30 PROCEDURE — G8417 CALC BMI ABV UP PARAM F/U: HCPCS | Performed by: NURSE PRACTITIONER

## 2024-04-30 PROCEDURE — 1036F TOBACCO NON-USER: CPT | Performed by: NURSE PRACTITIONER

## 2024-04-30 PROCEDURE — 3044F HG A1C LEVEL LT 7.0%: CPT | Performed by: NURSE PRACTITIONER

## 2024-04-30 PROCEDURE — 3079F DIAST BP 80-89 MM HG: CPT | Performed by: NURSE PRACTITIONER

## 2024-04-30 PROCEDURE — G8427 DOCREV CUR MEDS BY ELIG CLIN: HCPCS | Performed by: NURSE PRACTITIONER

## 2024-04-30 PROCEDURE — 3075F SYST BP GE 130 - 139MM HG: CPT | Performed by: NURSE PRACTITIONER

## 2024-04-30 PROCEDURE — 2022F DILAT RTA XM EVC RTNOPTHY: CPT | Performed by: NURSE PRACTITIONER

## 2024-04-30 NOTE — PROGRESS NOTES
Carli Stoddard (:  1985) is a 38 y.o. female,Established patient, here for evaluation of the following chief complaint(s):  Follow-Up from Hospital      Post-Discharge Transitional Care  Follow Up      Carli Stoddard   YOB: 1985    Date of Office Visit:  2024  Date of Hospital Admission: 24  Date of Hospital Discharge: 24  Risk of hospital readmission (high >=14%. Medium >=10%) :Readmission Risk Score: 13.3      Care management risk score Rising risk (score 2-5) and Complex Care (Scores >=6): No Risk Score On File     Non face to face  following discharge, date last encounter closed (first attempt may have been earlier): 2024    Call initiated 2 business days of discharge: Yes    ASSESSMENT/PLAN:   Type 1 diabetes mellitus with stage 3a chronic kidney disease (HCC)  -     Paolo Kent MD, Endocrinology, [place]      Medical Decision Making: low complexity  Return in about 3 months (around 2024).    On this date 2024 I have spent 10 minutes reviewing previous notes, test results and face to face with the patient discussing the diagnosis and importance of compliance with the treatment plan as well as documenting on the day of the visit.       Subjective:     HPI:  Follow up of Hospital problems/diagnosis(es): JOGRE on CKD    \"Hospital Course:  Carli Stoddard is a 38 y.o. female who was admitted for the management of Acute kidney injury superimposed on chronic kidney disease (HCC) , presented to ER with Hypertension and Abnormal Lab     Carli Stoddard is a 38 y.o. Non- / non  female who presents with Hypertension and Abnormal Lab and is admitted to the hospital for the management of Acute kidney injury superimposed on chronic kidney disease (HCC). Medical history significant for diabetes mellitus type 1, hypertension, hx of renal infarct, CKD stage V and anemia. She was seen by nephrology earlier today and sent to ER due to increased creatinine. Creatinine

## 2024-05-09 ENCOUNTER — OFFICE VISIT (OUTPATIENT)
Age: 39
End: 2024-05-09
Payer: COMMERCIAL

## 2024-05-09 ENCOUNTER — OFFICE VISIT (OUTPATIENT)
Dept: BEHAVIORAL/MENTAL HEALTH CLINIC | Age: 39
End: 2024-05-09
Payer: COMMERCIAL

## 2024-05-09 VITALS
HEIGHT: 62 IN | OXYGEN SATURATION: 99 % | SYSTOLIC BLOOD PRESSURE: 122 MMHG | DIASTOLIC BLOOD PRESSURE: 77 MMHG | WEIGHT: 172 LBS | TEMPERATURE: 97.8 F | HEART RATE: 91 BPM | BODY MASS INDEX: 31.65 KG/M2

## 2024-05-09 DIAGNOSIS — F41.9 ANXIETY: ICD-10-CM

## 2024-05-09 DIAGNOSIS — I10 ESSENTIAL HYPERTENSION: ICD-10-CM

## 2024-05-09 DIAGNOSIS — F43.21 ADJUSTMENT DISORDER WITH DEPRESSED MOOD: Primary | ICD-10-CM

## 2024-05-09 DIAGNOSIS — N18.32 STAGE 3B CHRONIC KIDNEY DISEASE (HCC): Primary | ICD-10-CM

## 2024-05-09 DIAGNOSIS — Z86.39 HISTORY OF DIABETES MELLITUS: ICD-10-CM

## 2024-05-09 PROCEDURE — G8427 DOCREV CUR MEDS BY ELIG CLIN: HCPCS | Performed by: SURGERY

## 2024-05-09 PROCEDURE — 99204 OFFICE O/P NEW MOD 45 MIN: CPT | Performed by: SURGERY

## 2024-05-09 PROCEDURE — 3074F SYST BP LT 130 MM HG: CPT | Performed by: SURGERY

## 2024-05-09 PROCEDURE — 1036F TOBACCO NON-USER: CPT | Performed by: SURGERY

## 2024-05-09 PROCEDURE — 3078F DIAST BP <80 MM HG: CPT | Performed by: SURGERY

## 2024-05-09 PROCEDURE — 90834 PSYTX W PT 45 MINUTES: CPT | Performed by: SOCIAL WORKER

## 2024-05-09 PROCEDURE — G8417 CALC BMI ABV UP PARAM F/U: HCPCS | Performed by: SURGERY

## 2024-05-09 PROCEDURE — 1036F TOBACCO NON-USER: CPT | Performed by: SOCIAL WORKER

## 2024-05-09 PROCEDURE — 1111F DSCHRG MED/CURRENT MED MERGE: CPT | Performed by: SURGERY

## 2024-05-09 NOTE — PROGRESS NOTES
ADULT BEHAVIORAL HEALTH FOLLOW UP  JOVI Motta  Licensed Independent          Visit Date: 5/9/2024   Time of appointment: 2:02 PM     Time spent with Patient: 50 minutes.   This is patient's second appointment.    Reason for Consult:  Anxiety     PCP:  Flor Hoskins APRN - CNP      Pt provided informed consent for the behavioral health program. Discussed with patient model of service to include the limits of confidentiality (i.e. abuse reporting, suicide intervention, etc.) and short-term intervention focused approach. Pt indicated understanding.    SUBJECTIVE  Carli is a 38 y.o. female who presents for follow up of anxiety.     Carli reported she has been feeling she has a better routine/structure with managing the kids at home. She has been able to implement rules and routine. She expressed feeling stress with this has decreased. Carli shared that the boys are planning to go to their mother's home for the summer to spend time with her. Carli explained that she is also navigating significant health challenges/decision making. Carli reported she was recently in the hospital due to being sick. She shared she has been able to solidify a team of medical providers that she feels supported by and is now exploring a transplant again as possible treatment option. Carli shared she was also provided option to do dialysis. Carli elaborated on the history of her diabetes worsening and how this has impacted her over time. When prompted about what Carli would like to work on in therapy, Carli stated \"to not be so anxious about things that I shouldn't be anxious about\" She shared she doesn't know if this is control related to experiences she had as a kid, she shared she feels she uses current stress to keep previous stress suppressed from the past. Carli shared she would like to explore \"why I'm still angry\" or will \"be sad for no reason\".     Previous Recommendations:   Carli was recommended to begin psychotherapy for

## 2024-05-09 NOTE — PATIENT INSTRUCTIONS
The office will be calling you soon to schedule surgery, please call our office if you do not receive a phone call within 1 week.

## 2024-05-13 ENCOUNTER — HOSPITAL ENCOUNTER (OUTPATIENT)
Age: 39
Discharge: HOME | End: 2024-05-13
Payer: MEDICARE

## 2024-05-13 DIAGNOSIS — E55.9 VITAMIN D DEFICIENCY: ICD-10-CM

## 2024-05-13 DIAGNOSIS — N18.4 BENIGN HYPERTENSION WITH CKD (CHRONIC KIDNEY DISEASE) STAGE IV (HCC): ICD-10-CM

## 2024-05-13 DIAGNOSIS — I12.9 BENIGN HYPERTENSION WITH CKD (CHRONIC KIDNEY DISEASE) STAGE IV (HCC): ICD-10-CM

## 2024-05-13 DIAGNOSIS — R80.9 PROTEINURIA, UNSPECIFIED TYPE: ICD-10-CM

## 2024-05-13 DIAGNOSIS — N18.4 CKD (CHRONIC KIDNEY DISEASE) STAGE 4, GFR 15-29 ML/MIN (HCC): ICD-10-CM

## 2024-05-13 DIAGNOSIS — E83.51 HYPOCALCEMIA: ICD-10-CM

## 2024-05-13 DIAGNOSIS — D63.1 ANEMIA IN STAGE 4 CHRONIC KIDNEY DISEASE (HCC): ICD-10-CM

## 2024-05-13 DIAGNOSIS — R80.9 MICROALBUMINURIA: ICD-10-CM

## 2024-05-13 DIAGNOSIS — N18.4 ANEMIA IN STAGE 4 CHRONIC KIDNEY DISEASE (HCC): ICD-10-CM

## 2024-05-13 DIAGNOSIS — E87.5 HYPERKALEMIA: ICD-10-CM

## 2024-05-13 DIAGNOSIS — N18.4 SECONDARY DIABETES MELLITUS WITH STAGE 4 CHRONIC KIDNEY DISEASE (HCC): ICD-10-CM

## 2024-05-13 DIAGNOSIS — E13.22 SECONDARY DIABETES MELLITUS WITH STAGE 4 CHRONIC KIDNEY DISEASE (HCC): ICD-10-CM

## 2024-05-13 LAB
25(OH)D3 SERPL-MCNC: 17.1 NG/ML (ref 30–100)
ANION GAP SERPL CALCULATED.3IONS-SCNC: 12 MMOL/L (ref 9–17)
BACTERIA URNS QL MICRO: ABNORMAL
BILIRUB UR QL STRIP: NEGATIVE
BUN SERPL-MCNC: 74 MG/DL (ref 6–20)
CALCIUM SERPL-MCNC: 8.6 MG/DL (ref 8.6–10.4)
CASTS #/AREA URNS LPF: ABNORMAL /LPF
CHLORIDE SERPL-SCNC: 105 MMOL/L (ref 98–107)
CLARITY UR: CLEAR
CO2 SERPL-SCNC: 20 MMOL/L (ref 20–31)
COLOR UR: YELLOW
CREAT SERPL-MCNC: 3.9 MG/DL (ref 0.5–0.9)
CREAT UR-MCNC: 44.2 MG/DL (ref 28–217)
EPI CELLS #/AREA URNS HPF: ABNORMAL /HPF
GFR, ESTIMATED: 14 ML/MIN/1.73M2
GLUCOSE UR STRIP-MCNC: NEGATIVE MG/DL
HCT VFR BLD AUTO: 25.4 % (ref 36–46)
HGB BLD-MCNC: 8.6 G/DL (ref 12–16)
HGB UR QL STRIP.AUTO: NEGATIVE
KETONES UR STRIP-MCNC: NEGATIVE MG/DL
LEUKOCYTE ESTERASE UR QL STRIP: ABNORMAL
MAGNESIUM SERPL-MCNC: 2.5 MG/DL (ref 1.6–2.6)
MICROALBUMIN UR-MCNC: 599 MG/L (ref 0–20)
MICROALBUMIN/CREAT UR-RTO: 1355 MCG/MG CREAT (ref 0–25)
NITRITE UR QL STRIP: NEGATIVE
PH UR STRIP: 5 [PH] (ref 5–8)
PHOSPHATE SERPL-MCNC: 5.1 MG/DL (ref 2.6–4.5)
POTASSIUM SERPL-SCNC: 4.8 MMOL/L (ref 3.7–5.3)
PROT UR STRIP-MCNC: ABNORMAL MG/DL
RBC #/AREA URNS HPF: ABNORMAL /HPF
SODIUM SERPL-SCNC: 137 MMOL/L (ref 135–144)
SP GR UR STRIP: 1.01 (ref 1–1.03)
UROBILINOGEN UR STRIP-ACNC: NORMAL EU/DL (ref 0–1)
WBC #/AREA URNS HPF: ABNORMAL /HPF

## 2024-05-13 PROCEDURE — 36415 COLL VENOUS BLD VENIPUNCTURE: CPT

## 2024-05-13 PROCEDURE — 85018 HEMOGLOBIN: CPT

## 2024-05-13 PROCEDURE — 80051 ELECTROLYTE PANEL: CPT

## 2024-05-13 PROCEDURE — 84100 ASSAY OF PHOSPHORUS: CPT

## 2024-05-13 PROCEDURE — 85014 HEMATOCRIT: CPT

## 2024-05-13 PROCEDURE — 82043 UR ALBUMIN QUANTITATIVE: CPT

## 2024-05-13 PROCEDURE — 82310 ASSAY OF CALCIUM: CPT

## 2024-05-13 PROCEDURE — 82565 ASSAY OF CREATININE: CPT

## 2024-05-13 PROCEDURE — 84520 ASSAY OF UREA NITROGEN: CPT

## 2024-05-13 PROCEDURE — 82570 ASSAY OF URINE CREATININE: CPT

## 2024-05-13 PROCEDURE — 81001 URINALYSIS AUTO W/SCOPE: CPT

## 2024-05-13 PROCEDURE — 83735 ASSAY OF MAGNESIUM: CPT

## 2024-05-13 PROCEDURE — 82306 VITAMIN D 25 HYDROXY: CPT

## 2024-05-15 ENCOUNTER — HOSPITAL ENCOUNTER (OUTPATIENT)
Age: 39
Setting detail: SPECIMEN
Discharge: HOME | End: 2024-05-15
Payer: MEDICARE

## 2024-05-15 DIAGNOSIS — N18.4 SECONDARY DIABETES MELLITUS WITH STAGE 4 CHRONIC KIDNEY DISEASE (HCC): ICD-10-CM

## 2024-05-15 DIAGNOSIS — I12.9 BENIGN HYPERTENSION WITH CKD (CHRONIC KIDNEY DISEASE) STAGE IV (HCC): ICD-10-CM

## 2024-05-15 DIAGNOSIS — E87.5 HYPERKALEMIA: ICD-10-CM

## 2024-05-15 DIAGNOSIS — E55.9 VITAMIN D DEFICIENCY: ICD-10-CM

## 2024-05-15 DIAGNOSIS — E83.51 HYPOCALCEMIA: ICD-10-CM

## 2024-05-15 DIAGNOSIS — R80.9 MICROALBUMINURIA: ICD-10-CM

## 2024-05-15 DIAGNOSIS — N18.4 ANEMIA IN STAGE 4 CHRONIC KIDNEY DISEASE (HCC): ICD-10-CM

## 2024-05-15 DIAGNOSIS — R80.9 PROTEINURIA, UNSPECIFIED TYPE: ICD-10-CM

## 2024-05-15 DIAGNOSIS — N18.4 CKD (CHRONIC KIDNEY DISEASE) STAGE 4, GFR 15-29 ML/MIN (HCC): ICD-10-CM

## 2024-05-15 DIAGNOSIS — E13.22 SECONDARY DIABETES MELLITUS WITH STAGE 4 CHRONIC KIDNEY DISEASE (HCC): ICD-10-CM

## 2024-05-15 DIAGNOSIS — N18.4 BENIGN HYPERTENSION WITH CKD (CHRONIC KIDNEY DISEASE) STAGE IV (HCC): ICD-10-CM

## 2024-05-15 DIAGNOSIS — D63.1 ANEMIA IN STAGE 4 CHRONIC KIDNEY DISEASE (HCC): ICD-10-CM

## 2024-05-15 LAB
COLLECT DURATION TIME UR: 24 H
CREAT SERPL-MCNC: 4.2 MG/DL (ref 0.5–0.9)
CREAT UR-MCNC: 47.9 MG/DL (ref 28–217)
CREATINE 24H UR-MRATE: 575 MG/24 H (ref 740–1570)
CREATININE CLEARANCE: 9.3 ML/MIN/BSA (ref 71–151)
HOURS COLLECTED: 24 H
PATIENT HEIGHT: 61 IN
PROTEIN 24 HOUR URINE: 1548 MG/24 H
SPECIMEN VOL 24H UR: 1200 ML
URINE TOTAL PROTEIN: 129 MG/DL
VOLUME: 1200 ML

## 2024-05-15 PROCEDURE — 84156 ASSAY OF PROTEIN URINE: CPT

## 2024-05-15 PROCEDURE — 82575 CREATININE CLEARANCE TEST: CPT

## 2024-05-17 ENCOUNTER — TELEPHONE (OUTPATIENT)
Age: 39
End: 2024-05-17

## 2024-05-17 NOTE — TELEPHONE ENCOUNTER
Called patient an schedule a procedure. Patient is making a decision about the fistula verses a peritoneal dialysis catheter, due to having children and pets in the home.  Dr. Jordan informed

## 2024-05-20 ENCOUNTER — HOSPITAL ENCOUNTER (OUTPATIENT)
Age: 39
Discharge: HOME | End: 2024-05-20
Payer: MEDICARE

## 2024-05-20 ENCOUNTER — INITIAL CONSULT (OUTPATIENT)
Dept: VASCULAR SURGERY | Age: 39
End: 2024-05-20
Payer: MEDICARE

## 2024-05-20 ENCOUNTER — TELEPHONE (OUTPATIENT)
Dept: VASCULAR SURGERY | Age: 39
End: 2024-05-20

## 2024-05-20 VITALS
SYSTOLIC BLOOD PRESSURE: 154 MMHG | HEART RATE: 97 BPM | OXYGEN SATURATION: 98 % | HEIGHT: 61 IN | DIASTOLIC BLOOD PRESSURE: 91 MMHG | BODY MASS INDEX: 32.1 KG/M2 | RESPIRATION RATE: 16 BRPM | WEIGHT: 170 LBS

## 2024-05-20 DIAGNOSIS — N18.5 ANEMIA IN STAGE 5 CHRONIC KIDNEY DISEASE, NOT ON CHRONIC DIALYSIS (HCC): ICD-10-CM

## 2024-05-20 DIAGNOSIS — N18.5 SECONDARY DIABETES MELLITUS WITH STAGE 5 CHRONIC KIDNEY DISEASE (HCC): ICD-10-CM

## 2024-05-20 DIAGNOSIS — E13.22 SECONDARY DIABETES MELLITUS WITH STAGE 5 CHRONIC KIDNEY DISEASE (HCC): ICD-10-CM

## 2024-05-20 DIAGNOSIS — N18.5 BENIGN HYPERTENSION WITH CKD (CHRONIC KIDNEY DISEASE) STAGE V (HCC): ICD-10-CM

## 2024-05-20 DIAGNOSIS — N18.6 END STAGE RENAL DISEASE (HCC): Primary | ICD-10-CM

## 2024-05-20 DIAGNOSIS — N18.5 CKD (CHRONIC KIDNEY DISEASE) STAGE 5, GFR LESS THAN 15 ML/MIN (HCC): ICD-10-CM

## 2024-05-20 DIAGNOSIS — I12.0 BENIGN HYPERTENSION WITH CKD (CHRONIC KIDNEY DISEASE) STAGE V (HCC): ICD-10-CM

## 2024-05-20 DIAGNOSIS — Z11.59 ENCOUNTER FOR SCREENING FOR OTHER VIRAL DISEASES: ICD-10-CM

## 2024-05-20 DIAGNOSIS — D63.1 ANEMIA IN STAGE 5 CHRONIC KIDNEY DISEASE, NOT ON CHRONIC DIALYSIS (HCC): ICD-10-CM

## 2024-05-20 LAB
HBV CORE AB SER QL: NONREACTIVE
HBV SURFACE AB SERPL IA-ACNC: 90.5 MIU/ML
HBV SURFACE AG SERPL QL IA: NONREACTIVE

## 2024-05-20 PROCEDURE — 36415 COLL VENOUS BLD VENIPUNCTURE: CPT

## 2024-05-20 PROCEDURE — 3080F DIAST BP >= 90 MM HG: CPT | Performed by: SURGERY

## 2024-05-20 PROCEDURE — 87340 HEPATITIS B SURFACE AG IA: CPT

## 2024-05-20 PROCEDURE — 86317 IMMUNOASSAY INFECTIOUS AGENT: CPT

## 2024-05-20 PROCEDURE — 99203 OFFICE O/P NEW LOW 30 MIN: CPT | Performed by: SURGERY

## 2024-05-20 PROCEDURE — 3077F SYST BP >= 140 MM HG: CPT | Performed by: SURGERY

## 2024-05-20 PROCEDURE — 86704 HEP B CORE ANTIBODY TOTAL: CPT

## 2024-05-20 ASSESSMENT — ENCOUNTER SYMPTOMS
COLOR CHANGE: 0
TROUBLE SWALLOWING: 0
COUGH: 0
VOICE CHANGE: 0
SHORTNESS OF BREATH: 0
ABDOMINAL PAIN: 0
VOMITING: 0
EYE PAIN: 0
CHEST TIGHTNESS: 0
ABDOMINAL DISTENTION: 0

## 2024-05-20 NOTE — TELEPHONE ENCOUNTER
Left vm to let patient know surgery is scheduled for 06/13/24 @11am. Not able to leave a vm due to it being full, will try again tomorrow. PAT testing is scheduled for 05/31/24 @ 245pm for a phone visit also.

## 2024-05-20 NOTE — PROGRESS NOTES
[facility], [facility] HEART AND VASCULAR  [address], [place] [facility] [address]  Dept: [phone]     Patient: Carli Stoddard  : 1985  MRN: [id number]  DOS: 2024    Referring provider:  Carlos Manuel Obrien MD         HPI:  Carli Stoddard is a 38 y.o. female who comes to the office for the first time regarding her end-stage renal disease.  She is not yet on dialysis but her GFR is 10-15.  She is here for discussion regarding permanent dialysis access.  She has no tunneled catheter.  She is diabetic with high blood pressure.  She has pancreatic insufficiency as well.  Past Medical History:   Diagnosis Date    CKD (chronic kidney disease), stage III (HCC)     Diabetes mellitus type 1 (HCC)     History of anemia     reports prior blood transfusion x 2- is establishing care locally, recent move from [facility]    History of seizures     last time 10 years ago, states related to low blood sugar    Hypertension     Insulin pump in place     Neuropathy     Renal infarct (HCC)     Due to UTI/DKA    Under care of team 2022    ENDOCRINOLOGY - SEES DRS IN [place] - CHANGING TO LOCAL CARE    Under care of team 2022    GI - DR. ERAZO - 2022    Under care of team 2022    HEM/ONC - DR. STEVE    Under care of team 2022    NEPHROLOGY - To schedule appointment.    Wellness examination 2022    PCP - JOYCELYN SOLORIO CNP - last visit 2022     Family History   Problem Relation Age of Onset    Anemia Mother     Cancer Maternal Grandmother     Anemia Maternal Grandmother     Cancer Maternal Grandfather       Social History     Socioeconomic History    Marital status: Single     Spouse name: Not on file    Number of children: Not on file    Years of education: Not on file    Highest education level: Not on file   Occupational History    Not on file   Tobacco Use    Smoking status: Never    Smokeless tobacco: Never   Substance and

## 2024-05-20 NOTE — H&P (VIEW-ONLY)
[facility], [facility] HEART AND VASCULAR  [address], [place] [facility] [address]  Dept: [phone]     Patient: Carli Stoddard  : 1985  MRN: [id number]  DOS: 2024    Referring provider:  Carlos Manuel Obrien MD         HPI:  Carli Sotddard is a 38 y.o. female who comes to the office for the first time regarding her end-stage renal disease.  She is not yet on dialysis but her GFR is 10-15.  She is here for discussion regarding permanent dialysis access.  She has no tunneled catheter.  She is diabetic with high blood pressure.  She has pancreatic insufficiency as well.  Past Medical History:   Diagnosis Date    CKD (chronic kidney disease), stage III (HCC)     Diabetes mellitus type 1 (HCC)     History of anemia     reports prior blood transfusion x 2- is establishing care locally, recent move from [facility]    History of seizures     last time 10 years ago, states related to low blood sugar    Hypertension     Insulin pump in place     Neuropathy     Renal infarct (HCC)     Due to UTI/DKA    Under care of team 2022    ENDOCRINOLOGY - SEES DRS IN [place] - CHANGING TO LOCAL CARE    Under care of team 2022    GI - DR. ERAZO - 2022    Under care of team 2022    HEM/ONC - DR. STEVE    Under care of team 2022    NEPHROLOGY - To schedule appointment.    Wellness examination 2022    PCP - JOYCELYN SOLORIO CNP - last visit 2022     Family History   Problem Relation Age of Onset    Anemia Mother     Cancer Maternal Grandmother     Anemia Maternal Grandmother     Cancer Maternal Grandfather       Social History     Socioeconomic History    Marital status: Single     Spouse name: Not on file    Number of children: Not on file    Years of education: Not on file    Highest education level: Not on file   Occupational History    Not on file   Tobacco Use    Smoking status: Never    Smokeless tobacco: Never   Substance and  Sexual Activity    Alcohol use: No    Drug use: Never    Sexual activity: Not on file   Other Topics Concern    Not on file   Social History Narrative    Not on file     Social Determinants of Health     Financial Resource Strain: Low Risk  (8/16/2023)    Overall Financial Resource Strain (CARDIA)     Difficulty of Paying Living Expenses: Not hard at all   Food Insecurity: Not on file (11/26/2023)   Transportation Needs: No Transportation Needs (11/26/2023)    Transportation Problems ([facility] HRSN)     In the past 12 months, has lack of reliable transportation kept you from medical appointments, meetings, work or from getting things needed for daily living?: Not on file   Physical Activity: Not on file   Stress: Not on file   Social Connections: Not on file   Intimate Partner Violence: Not on file   Housing Stability: Low Risk  (11/26/2023)    Housing Stability Vital Sign     Unable to Pay for Housing in the Last Year: No     Number of Places Lived in the Last Year: 1     Unstable Housing in the Last Year: No      Past Surgical History:   Procedure Laterality Date    TUBAL LIGATION  11/2018    UPPER GASTROINTESTINAL ENDOSCOPY N/A 11/2/2022    EGD BIOPSY performed by Jun Londono MD at [facility] Endoscopy    UPPER GASTROINTESTINAL ENDOSCOPY N/A 12/7/2022    EGD POLYP SNARE, HOT (BIOPSY AT SAME SITE, SAME NODULE) performed by Fuentes Hernandez MD at [facility] OR      Review of Systems   Constitutional:  Negative for activity change, appetite change, fever and unexpected weight change.   HENT:  Negative for congestion, trouble swallowing and voice change.    Eyes:  Negative for pain and visual disturbance.   Respiratory:  Negative for cough, chest tightness and shortness of breath.    Cardiovascular:  Negative for chest pain and palpitations.   Gastrointestinal:  Negative for abdominal distention, abdominal pain and vomiting.   Endocrine: Negative for cold intolerance and heat intolerance.   Genitourinary:  Negative for dysuria,

## 2024-05-21 ENCOUNTER — TELEPHONE (OUTPATIENT)
Dept: VASCULAR SURGERY | Age: 39
End: 2024-05-21

## 2024-05-21 NOTE — TELEPHONE ENCOUNTER
Dr. Obrien's office called today stating that the patient needed to be put on for a CVC placement, I did get the patients surgery scheduled for tomorrow at 430pm with , I did call the patient to inform her and the patient had no idea about  wanting this surgery and she said she wanted to think on it and give us a call back once she called the other doctors office, I did speak to the 's office and asked their MA if she had called the patient before calling us and she stated she did not yet because she's there alone, I tried to call the patient 3 times, I did leave a vm letting patient know what time her surgery is and when she needs to arrive.

## 2024-05-22 ENCOUNTER — TELEPHONE (OUTPATIENT)
Dept: PRIMARY CARE CLINIC | Age: 39
End: 2024-05-22

## 2024-05-22 NOTE — TELEPHONE ENCOUNTER
Pt insulin came through the mail to the office. Patient just has to come in and sign off for it.       Pt notified.

## 2024-05-30 ENCOUNTER — OFFICE VISIT (OUTPATIENT)
Dept: BEHAVIORAL/MENTAL HEALTH CLINIC | Age: 39
End: 2024-05-30

## 2024-05-30 DIAGNOSIS — F43.21 ADJUSTMENT DISORDER WITH DEPRESSED MOOD: Primary | ICD-10-CM

## 2024-05-30 DIAGNOSIS — F41.9 ANXIETY: ICD-10-CM
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
## 2024-05-31 ENCOUNTER — HOSPITAL ENCOUNTER (OUTPATIENT)
Dept: PREADMISSION TESTING | Age: 39
Discharge: HOME | End: 2024-06-04

## 2024-05-31 VITALS — WEIGHT: 169 LBS | HEIGHT: 61 IN | BODY MASS INDEX: 31.91 KG/M2

## 2024-05-31 NOTE — PROGRESS NOTES
, please bring a copy.    You will be taken to the pre-op holding area where you will be prepared for surgery.  A physical assessment will be performed by a nurse practitioner or house officer.  Your IV will be started and you will meet your anesthesiologist.    When you go to surgery, your family will be directed to the surgical waiting room, where the doctor should speak with them after your surgery.    After surgery, you will be taken to the recovery area.  When you are alert and stable, you will receive instructions and be prepared for discharge.     If you use a Bi-PAP or C-PAP machine, please bring it with you and leave it in the car in case it is needed in recovery room.    INSTRUCTIONS READ TO EDIN, UNDERSTANDING VERBALIZED.  AV FISTULA CREATION LEFT UPPER EXTREMITY VS. AV GRAFT- 06/13/2024

## 2024-06-13 ENCOUNTER — ANESTHESIA EVENT (OUTPATIENT)
Dept: OPERATING ROOM | Age: 39
End: 2024-06-13
Payer: MEDICARE

## 2024-06-13 ENCOUNTER — ANESTHESIA (OUTPATIENT)
Dept: OPERATING ROOM | Age: 39
End: 2024-06-13
Payer: MEDICARE

## 2024-06-13 ENCOUNTER — HOSPITAL ENCOUNTER (OUTPATIENT)
Age: 39
Setting detail: OUTPATIENT SURGERY
Discharge: HOME | End: 2024-06-13
Attending: SURGERY | Admitting: SURGERY
Payer: MEDICARE

## 2024-06-13 VITALS
DIASTOLIC BLOOD PRESSURE: 88 MMHG | WEIGHT: 174 LBS | TEMPERATURE: 98.7 F | HEIGHT: 61 IN | BODY MASS INDEX: 32.85 KG/M2 | OXYGEN SATURATION: 97 % | RESPIRATION RATE: 24 BRPM | HEART RATE: 84 BPM | SYSTOLIC BLOOD PRESSURE: 115 MMHG

## 2024-06-13 DIAGNOSIS — G89.18 ACUTE POST-OPERATIVE PAIN: Primary | ICD-10-CM

## 2024-06-13 LAB
BUN BLD-MCNC: 56 MG/DL (ref 8–26)
CHLORIDE BLD-SCNC: 111 MMOL/L (ref 98–107)
EGFR, POC: 14 ML/MIN/1.73M2
GLUCOSE BLD-MCNC: 111 MG/DL (ref 74–100)
HCT VFR BLD AUTO: 34 % (ref 36–46)
POC CREATININE: 4.1 MG/DL (ref 0.51–1.19)
POC HEMOGLOBIN (CALC): 11.4 G/DL (ref 12–16)
POTASSIUM BLD-SCNC: 5 MMOL/L (ref 3.5–4.5)
SODIUM BLD-SCNC: 143 MMOL/L (ref 138–146)

## 2024-06-13 PROCEDURE — 2500000003 HC RX 250 WO HCPCS: Performed by: NURSE ANESTHETIST, CERTIFIED REGISTERED

## 2024-06-13 PROCEDURE — 36821 AV FUSION DIRECT ANY SITE: CPT | Performed by: SURGERY

## 2024-06-13 PROCEDURE — 6360000002 HC RX W HCPCS: Performed by: SURGERY

## 2024-06-13 PROCEDURE — 84520 ASSAY OF UREA NITROGEN: CPT

## 2024-06-13 PROCEDURE — 2709999900 HC NON-CHARGEABLE SUPPLY: Performed by: SURGERY

## 2024-06-13 PROCEDURE — 3600000003 HC SURGERY LEVEL 3 BASE: Performed by: SURGERY

## 2024-06-13 PROCEDURE — 7100000010 HC PHASE II RECOVERY - FIRST 15 MIN: Performed by: SURGERY

## 2024-06-13 PROCEDURE — 82565 ASSAY OF CREATININE: CPT

## 2024-06-13 PROCEDURE — 82947 ASSAY GLUCOSE BLOOD QUANT: CPT

## 2024-06-13 PROCEDURE — 7100000011 HC PHASE II RECOVERY - ADDTL 15 MIN: Performed by: SURGERY

## 2024-06-13 PROCEDURE — 2500000003 HC RX 250 WO HCPCS: Performed by: SURGERY

## 2024-06-13 PROCEDURE — 85014 HEMATOCRIT: CPT

## 2024-06-13 PROCEDURE — 3700000001 HC ADD 15 MINUTES (ANESTHESIA): Performed by: SURGERY

## 2024-06-13 PROCEDURE — 84295 ASSAY OF SERUM SODIUM: CPT

## 2024-06-13 PROCEDURE — 82435 ASSAY OF BLOOD CHLORIDE: CPT

## 2024-06-13 PROCEDURE — 2580000003 HC RX 258: Performed by: SURGERY

## 2024-06-13 PROCEDURE — 6370000000 HC RX 637 (ALT 250 FOR IP): Performed by: SURGERY

## 2024-06-13 PROCEDURE — 3600000013 HC SURGERY LEVEL 3 ADDTL 15MIN: Performed by: SURGERY

## 2024-06-13 PROCEDURE — A4217 STERILE WATER/SALINE, 500 ML: HCPCS | Performed by: SURGERY

## 2024-06-13 PROCEDURE — 2580000003 HC RX 258: Performed by: NURSE ANESTHETIST, CERTIFIED REGISTERED

## 2024-06-13 PROCEDURE — 6360000002 HC RX W HCPCS: Performed by: NURSE ANESTHETIST, CERTIFIED REGISTERED

## 2024-06-13 PROCEDURE — 84132 ASSAY OF SERUM POTASSIUM: CPT

## 2024-06-13 PROCEDURE — 3700000000 HC ANESTHESIA ATTENDED CARE: Performed by: SURGERY

## 2024-06-13 RX ORDER — PROPOFOL 10 MG/ML
INJECTION, EMULSION INTRAVENOUS
Status: COMPLETED
Start: 2024-06-13 | End: 2024-06-13

## 2024-06-13 RX ORDER — MAGNESIUM HYDROXIDE 1200 MG/15ML
LIQUID ORAL CONTINUOUS PRN
Status: COMPLETED | OUTPATIENT
Start: 2024-06-13 | End: 2024-06-13

## 2024-06-13 RX ORDER — PROPOFOL 10 MG/ML
INJECTION, EMULSION INTRAVENOUS CONTINUOUS PRN
Status: DISCONTINUED | OUTPATIENT
Start: 2024-06-13 | End: 2024-06-13 | Stop reason: SDUPTHER

## 2024-06-13 RX ORDER — PROCHLORPERAZINE EDISYLATE 5 MG/ML
5 INJECTION INTRAMUSCULAR; INTRAVENOUS
Status: DISCONTINUED | OUTPATIENT
Start: 2024-06-13 | End: 2024-06-13 | Stop reason: HOSPADM

## 2024-06-13 RX ORDER — FENTANYL CITRATE 50 UG/ML
INJECTION, SOLUTION INTRAMUSCULAR; INTRAVENOUS PRN
Status: DISCONTINUED | OUTPATIENT
Start: 2024-06-13 | End: 2024-06-13 | Stop reason: SDUPTHER

## 2024-06-13 RX ORDER — SODIUM CHLORIDE 9 MG/ML
INJECTION, SOLUTION INTRAVENOUS CONTINUOUS
Status: DISCONTINUED | OUTPATIENT
Start: 2024-06-13 | End: 2024-06-13 | Stop reason: HOSPADM

## 2024-06-13 RX ORDER — EPHEDRINE SULFATE/0.9% NACL/PF 25 MG/5 ML
SYRINGE (ML) INTRAVENOUS PRN
Status: DISCONTINUED | OUTPATIENT
Start: 2024-06-13 | End: 2024-06-13 | Stop reason: SDUPTHER

## 2024-06-13 RX ORDER — CEFAZOLIN SODIUM 1 G/3ML
INJECTION, POWDER, FOR SOLUTION INTRAMUSCULAR; INTRAVENOUS PRN
Status: DISCONTINUED | OUTPATIENT
Start: 2024-06-13 | End: 2024-06-13 | Stop reason: SDUPTHER

## 2024-06-13 RX ORDER — OXYCODONE HYDROCHLORIDE 5 MG/1
5 TABLET ORAL EVERY 6 HOURS PRN
Qty: 12 TABLET | Refills: 0 | Status: SHIPPED | OUTPATIENT
Start: 2024-06-13 | End: 2024-06-16

## 2024-06-13 RX ORDER — LIDOCAINE HYDROCHLORIDE 10 MG/ML
INJECTION, SOLUTION EPIDURAL; INFILTRATION; INTRACAUDAL; PERINEURAL PRN
Status: DISCONTINUED | OUTPATIENT
Start: 2024-06-13 | End: 2024-06-13 | Stop reason: ALTCHOICE

## 2024-06-13 RX ORDER — PROPOFOL 10 MG/ML
INJECTION, EMULSION INTRAVENOUS PRN
Status: DISCONTINUED | OUTPATIENT
Start: 2024-06-13 | End: 2024-06-13 | Stop reason: SDUPTHER

## 2024-06-13 RX ORDER — SODIUM CHLORIDE, SODIUM LACTATE, POTASSIUM CHLORIDE, CALCIUM CHLORIDE 600; 310; 30; 20 MG/100ML; MG/100ML; MG/100ML; MG/100ML
INJECTION, SOLUTION INTRAVENOUS CONTINUOUS
Status: DISCONTINUED | OUTPATIENT
Start: 2024-06-13 | End: 2024-06-13 | Stop reason: HOSPADM

## 2024-06-13 RX ORDER — NALOXONE HYDROCHLORIDE 0.4 MG/ML
INJECTION, SOLUTION INTRAMUSCULAR; INTRAVENOUS; SUBCUTANEOUS PRN
Status: DISCONTINUED | OUTPATIENT
Start: 2024-06-13 | End: 2024-06-13 | Stop reason: HOSPADM

## 2024-06-13 RX ORDER — FENTANYL CITRATE 50 UG/ML
25 INJECTION, SOLUTION INTRAMUSCULAR; INTRAVENOUS EVERY 5 MIN PRN
Status: DISCONTINUED | OUTPATIENT
Start: 2024-06-13 | End: 2024-06-13 | Stop reason: HOSPADM

## 2024-06-13 RX ORDER — SODIUM CHLORIDE 0.9 % (FLUSH) 0.9 %
5-40 SYRINGE (ML) INJECTION EVERY 12 HOURS SCHEDULED
Status: DISCONTINUED | OUTPATIENT
Start: 2024-06-13 | End: 2024-06-13 | Stop reason: HOSPADM

## 2024-06-13 RX ORDER — SODIUM CHLORIDE 9 MG/ML
INJECTION, SOLUTION INTRAVENOUS CONTINUOUS PRN
Status: DISCONTINUED | OUTPATIENT
Start: 2024-06-13 | End: 2024-06-13 | Stop reason: SDUPTHER

## 2024-06-13 RX ORDER — MIDAZOLAM HYDROCHLORIDE 1 MG/ML
INJECTION INTRAMUSCULAR; INTRAVENOUS PRN
Status: DISCONTINUED | OUTPATIENT
Start: 2024-06-13 | End: 2024-06-13 | Stop reason: SDUPTHER

## 2024-06-13 RX ORDER — LIDOCAINE HYDROCHLORIDE 10 MG/ML
INJECTION, SOLUTION EPIDURAL; INFILTRATION; INTRACAUDAL; PERINEURAL PRN
Status: DISCONTINUED | OUTPATIENT
Start: 2024-06-13 | End: 2024-06-13 | Stop reason: SDUPTHER

## 2024-06-13 RX ORDER — HEPARIN SODIUM 1000 [USP'U]/ML
INJECTION, SOLUTION INTRAVENOUS; SUBCUTANEOUS PRN
Status: DISCONTINUED | OUTPATIENT
Start: 2024-06-13 | End: 2024-06-13 | Stop reason: SDUPTHER

## 2024-06-13 RX ORDER — ACETAMINOPHEN 500 MG
500 TABLET ORAL 4 TIMES DAILY PRN
Qty: 40 TABLET | Refills: 0 | Status: SHIPPED | OUTPATIENT
Start: 2024-06-13 | End: 2024-06-23

## 2024-06-13 RX ORDER — ONDANSETRON 2 MG/ML
4 INJECTION INTRAMUSCULAR; INTRAVENOUS
Status: DISCONTINUED | OUTPATIENT
Start: 2024-06-13 | End: 2024-06-13 | Stop reason: HOSPADM

## 2024-06-13 RX ADMIN — PROPOFOL 125 MCG/KG/MIN: 10 INJECTION, EMULSION INTRAVENOUS at 09:36

## 2024-06-13 RX ADMIN — HEPARIN SODIUM 5000 UNITS: 1000 INJECTION INTRAVENOUS; SUBCUTANEOUS at 10:19

## 2024-06-13 RX ADMIN — FENTANYL CITRATE 50 MCG: 50 INJECTION, SOLUTION INTRAMUSCULAR; INTRAVENOUS at 11:06

## 2024-06-13 RX ADMIN — MIDAZOLAM 2 MG: 1 INJECTION INTRAMUSCULAR; INTRAVENOUS at 09:27

## 2024-06-13 RX ADMIN — SODIUM CHLORIDE: 9 INJECTION, SOLUTION INTRAVENOUS at 09:27

## 2024-06-13 RX ADMIN — LIDOCAINE HYDROCHLORIDE 50 MG: 10 INJECTION, SOLUTION EPIDURAL; INFILTRATION; INTRACAUDAL; PERINEURAL at 09:38

## 2024-06-13 RX ADMIN — SODIUM CHLORIDE: 9 INJECTION, SOLUTION INTRAVENOUS at 09:08

## 2024-06-13 RX ADMIN — PROPOFOL 50 MG: 10 INJECTION, EMULSION INTRAVENOUS at 09:36

## 2024-06-13 RX ADMIN — CEFAZOLIN 2 G: 1 INJECTION, POWDER, FOR SOLUTION INTRAMUSCULAR; INTRAVENOUS at 09:42

## 2024-06-13 RX ADMIN — FENTANYL CITRATE 50 MCG: 50 INJECTION, SOLUTION INTRAMUSCULAR; INTRAVENOUS at 09:36

## 2024-06-13 RX ADMIN — EPHEDRINE SULFATE 5 MG: 5 INJECTION INTRAVENOUS at 10:28

## 2024-06-13 ASSESSMENT — ENCOUNTER SYMPTOMS: SHORTNESS OF BREATH: 1

## 2024-06-13 NOTE — OP NOTE
Operative Note      Patient: Carli Stoddard  YOB: 1985  MRN: [id number]    Date of Procedure: 6/13/2024    Pre-Op Diagnosis Codes:     * End stage renal disease (HCC) [N18.6]    Post-Op Diagnosis: Same       Procedure(s):  LEFT UPPER EXTREMITY ARTERIOVENOUS FISTULA CREATION    Surgeon(s):  Herrera Vaughn MD    Assistant:   Loan Orosco    Anesthesia: Monitor Anesthesia Care    Estimated Blood Loss (mL): Minimal    Complications: None    Specimens:   * No specimens in log *    Implants:  * No implants in log *      Drains: * No LDAs found *    Findings:  Infection Present At Time Of Surgery (PATOS) (choose all levels that have infection present):  No infection present  Other Findings: There was a weakly palpable thrill at the conclusion of the case.  The patient had a 1-1/2 to 2 mm distal radial artery.  The artery was so small that there was a small hole in the back wall after entering with an 11 blade.  This was closed without loss of a distal radial pulse at the conclusion the case with interrupted 7-0 Prolene suture on the back wall.  There was an excellent signal through the fistula after those maneuvers.  The vein was approximately 4 mm in diameter after hydrostatic dilation with heparin saline.  The hand was warm and well-perfused.     Detailed Description of Procedure:   The patient was brought to the operating room and placed in the supine position with the left upper extremity at a 90 degree angle to the body.  The patient was appropriately identified and monitored anesthesia care was begun.  The left upper extremity was prepped and draped in a sterile fashion.  Timeout was held before lidocaine without epinephrine was infiltrated into the skin and subcutaneous tissue overlying the distal radial artery and distal cephalic vein.  An incision was made between the artery and vein on the skin through which Bovie cautery was used to dissect down to the level of the subcutaneous tissue.  A lateral  flap was created to gain access to the vein.  The vein was then exposed circumferentially for approximately 5 cm in length.  The fascia was then incised directly over the arterial pulsations with the Bovie cautery and the artery was exposed on his anterior lateral and medial surfaces.  The patient was then given 5000 units of heparin.  3 minutes were allowed to elapse while the vein was transected distally and tied on the patient's side with a 4-0 silk tie.  The vein was transposed easily to the level of the artery.  The artery was then clamped with bulldog clamps both proximally and distally and a longitudinal arteriotomy was made with an 11 blade and Seo scissors to measure approximately 7 mm in length.  I noticed that there was a small hole from the 11 blade in the back wall of the artery.  I expected that this may bleed at the conclusion of the anastomosis with reintroduction of flow through the system.  The vein was then cut to length and spatulated and the anastomosis was done with a single 7-0 Prolene in a running fashion starting at the heel of the vein and working around the posterior wall to the apex.  The second arm suture was used to work the anterior wall to the apex.  Antegrade and retrograde flush was achieved through each of the arteries through the fistula.  Flow was initiated then through the fistula first from the distal artery and then the proximal.  There was a palpable thrill in the fistula.  There was bleeding from the back wall which was not unexpected.  This was repaired by establishing both proximal and distal control once again and sewing the back wall shot with 2 interrupted 7-0 Prolene's taking very thin bites.  This controlled the bleeding completely.  There was still a palpable thrill and good distal radial pulse.  There were no kinks in the fistula.  The tract of the fistula was smooth and uninterrupted.  There was no bleeding in the dissection planes and therefore the skin was

## 2024-06-13 NOTE — ANESTHESIA POSTPROCEDURE EVALUATION
Department of Anesthesiology  Postprocedure Note    Patient: Carli Stoddard  MRN: [id number]  YOB: 1985  Date of evaluation: 6/13/2024    Procedure Summary       Date: 06/13/24 Room / Location: [place] [address] / [facility]    Anesthesia Start: 0927 Anesthesia Stop: 1120    Procedure: LEFT UPPER EXTREMITY ARTERIOVENOUS FISTULA CREATION (Left: Arm Upper) Diagnosis:       End stage renal disease (HCC)      (End stage renal disease (HCC) [N18.6])    Surgeons: Herrera Vaughn MD Responsible Provider: Amber Gilliam MD    Anesthesia Type: TIVA, MAC ASA Status: 4            Anesthesia Type: No value filed.    Donte Phase I: Donte Score: 8    Donte Phase II:      Anesthesia Post Evaluation    Patient location during evaluation: PACU  Patient participation: complete - patient participated  Level of consciousness: awake and alert  Airway patency: patent  Nausea & Vomiting: no nausea and no vomiting  Cardiovascular status: blood pressure returned to baseline  Respiratory status: acceptable  Hydration status: euvolemic  Comments: No known anesthesia related complication  Multimodal analgesia pain management approach  Pain management: adequate    No notable events documented.

## 2024-06-13 NOTE — INTERVAL H&P NOTE
Update History & Physical    The patient's History and Physical of May 20,  was reviewed with the patient and I examined the patient. There was no change. The surgical site was confirmed by the patient and me.     Plan: The risks, benefits, expected outcome, and alternative to the recommended procedure have been discussed with the patient. Patient understands and wants to proceed with the procedure.     Electronically signed by Herrera Vaughn MD on 6/13/2024 at 9:19 AM

## 2024-06-13 NOTE — ANESTHESIA PRE PROCEDURE
Department of Anesthesiology  Preprocedure Note       Name:  Carli Stoddard   Age:  38 y.o.  :  1985                                          MRN:  [id number]         Date:  2024      Surgeon: Surgeon(s):  Herrera Vaughn MD    Procedure: Procedure(s):  UPPER EXTREMITY ARTERIOVENOUS FISTULA VS GRAFT CREATION    Medications prior to admission:   Prior to Admission medications    Medication Sig Start Date End Date Taking? Authorizing Provider   vitamin D (ERGOCALCIFEROL) 1.25 MG (86394 UT) CAPS capsule Take 1 capsule by mouth once a week 24   Carlos Manuel Obrien MD   hydrALAZINE (APRESOLINE) 25 MG tablet Take 2 tablets by mouth 3 times daily 24   Kristi Stern MD   losartan (COZAAR) 50 MG tablet Take 2 tablets by mouth daily 24   Kristi Stern MD   furosemide (LASIX) 40 MG tablet Take 1 tablet by mouth daily 24   Kristi Stern MD   carvedilol (COREG) 3.125 MG tablet Take 1 tablet by mouth 2 times daily 24   Kristi Stern MD       Current medications:    No current facility-administered medications for this encounter.     Current Outpatient Medications   Medication Sig Dispense Refill   • vitamin D (ERGOCALCIFEROL) 1.25 MG (19616 UT) CAPS capsule Take 1 capsule by mouth once a week 12 capsule 1   • hydrALAZINE (APRESOLINE) 25 MG tablet Take 2 tablets by mouth 3 times daily 270 tablet 3   • losartan (COZAAR) 50 MG tablet Take 2 tablets by mouth daily 30 tablet 5   • furosemide (LASIX) 40 MG tablet Take 1 tablet by mouth daily 60 tablet 3   • carvedilol (COREG) 3.125 MG tablet Take 1 tablet by mouth 2 times daily 60 tablet 3       Allergies:    Allergies   Allergen Reactions   • Latex Rash   • Cinnamon Hives     Itching and hives   • Iodides      Other reaction(s): Other (See Comments)  Kidney reaction   • Iodine Other (See Comments)     Kidney reaction   • Morphine Other (See Comments)     Hallucinations    Hallucinations. She is able to take Dilaudid  Hallucinations. She is

## 2024-06-13 NOTE — DISCHARGE INSTRUCTIONS
Patient Discharge Instructions  Discharge Date:  6/13/2024    HYGEINE: Ok to shower 24 hours after surgery, no soaking in a tub/pool until seen at your follow up appointment. Clean incision daily with gentle soap and water, do not use alcohol/peroxide or any harsh cleansers. There are surgical sutures over your incision(s), will see you in 7-14 days as outpatient to take out suture    Do not remove dressing for 2 days, okay to take a shower    DRIVING: No driving while taking narcotic medications or while in pain    ACTIVITY: No lifting greater than 10lbs for 6 weeks or any strenuous activity.     DIET: Resume your normal diet as advised by your PCP.    MEDICATIONS: Take all medications as prescribed. Take medications as prescribed by physician. For adequate pain control, take tylenol and motrin in alternating cycles (e.g. take 500mg tylenol at 8am, take 400mg motrin at 12pm, take 500mg tylenol at 4pm, take 400mg motrin at 8pm, etc.). If prescribed narcotic medications (Norco, Percocet, or Roxicodone), use for breakthrough pain and attempt to wean off medications as soon as possible. Do not take more than 4000mg tylenol in 24 hours.     SPECIAL INSTRUCTIONS:     Follow up with Dr. Vaughn in 2 weeks, call the clinic for appointment. Call sooner if fever above 101.4 degrees Celsius, increase in swelling or redness, thick purulent discharge or pain not controlled with medications.

## 2024-06-13 NOTE — PROGRESS NOTES
CLINICAL PHARMACY NOTE: MEDS TO BEDS    Total # of Prescriptions Filled: 2   The following medications were delivered to the patient:  Tylenol  oxycodone    Additional Documentation:

## 2024-06-24 ENCOUNTER — OFFICE VISIT (OUTPATIENT)
Dept: VASCULAR SURGERY | Age: 39
End: 2024-06-24

## 2024-06-24 VITALS
BODY MASS INDEX: 31.15 KG/M2 | OXYGEN SATURATION: 98 % | RESPIRATION RATE: 16 BRPM | HEART RATE: 99 BPM | HEIGHT: 61 IN | DIASTOLIC BLOOD PRESSURE: 84 MMHG | WEIGHT: 165 LBS | SYSTOLIC BLOOD PRESSURE: 155 MMHG

## 2024-06-24 DIAGNOSIS — N18.6 END STAGE RENAL DISEASE (HCC): Primary | ICD-10-CM

## 2024-06-24 PROCEDURE — 99024 POSTOP FOLLOW-UP VISIT: CPT | Performed by: SURGERY

## 2024-06-24 NOTE — PROGRESS NOTES
[facility], [facility] HEART AND VASCULAR  [address] [address]  Dept: [phone]     Patient: Carli Stoddard  : 1985  MRN: [id number]  DOS: 2024            HPI:  Carli Stoddard is a 38 y.o. female who returns to the office regarding her left distal radiocephalic fistula construction.  This is working well on examination.  She is not yet being dialyzed through the fistula.  She is back for her first postoperative appointment.    Review of Systems    Vitals:    24 1357   BP: (!) 155/84   Site: Right Upper Arm   Position: Sitting   Cuff Size: Medium Adult   Pulse: 99   Resp: 16   SpO2: 98%   Weight: 74.8 kg (165 lb)   Height: 1.549 m (5' 1\")          Physical Exam  The incisions are healing well.  There is no sign of infection.  There is no dehiscence.  There is an outstanding thrill.  She has an outstanding distal radial pulse.  Assessment:  1. End stage renal disease (HCC)          Plan:  At this point we will have her back in another 8 weeks to examine the utility of using the fistula.  Currently we will remove the sutures and have her back in 2 months.    Electronically signed by:  Herrera Vaughn MD

## 2024-07-01 ENCOUNTER — HOSPITAL ENCOUNTER (OUTPATIENT)
Age: 39
Setting detail: SPECIMEN
Discharge: HOME | End: 2024-07-01

## 2024-07-01 ENCOUNTER — OFFICE VISIT (OUTPATIENT)
Dept: OBGYN CLINIC | Age: 39
End: 2024-07-01
Payer: MEDICARE

## 2024-07-01 VITALS
WEIGHT: 167 LBS | DIASTOLIC BLOOD PRESSURE: 90 MMHG | HEIGHT: 61 IN | SYSTOLIC BLOOD PRESSURE: 134 MMHG | BODY MASS INDEX: 31.53 KG/M2

## 2024-07-01 DIAGNOSIS — Z11.51 SPECIAL SCREENING EXAMINATION FOR HUMAN PAPILLOMAVIRUS (HPV): ICD-10-CM

## 2024-07-01 DIAGNOSIS — Z12.31 ENCOUNTER FOR SCREENING MAMMOGRAM FOR MALIGNANT NEOPLASM OF BREAST: ICD-10-CM

## 2024-07-01 DIAGNOSIS — Z01.419 WELL FEMALE EXAM WITH ROUTINE GYNECOLOGICAL EXAM: Primary | ICD-10-CM

## 2024-07-01 PROCEDURE — 99385 PREV VISIT NEW AGE 18-39: CPT | Performed by: NURSE PRACTITIONER

## 2024-07-01 PROCEDURE — 3080F DIAST BP >= 90 MM HG: CPT | Performed by: NURSE PRACTITIONER

## 2024-07-01 PROCEDURE — 3075F SYST BP GE 130 - 139MM HG: CPT | Performed by: NURSE PRACTITIONER

## 2024-07-01 NOTE — PROGRESS NOTES
Neck pain 01/28/2019    Amenorrhea, secondary 12/17/2018    Female dyspareunia 09/01/2018    Diabetic polyneuropathy associated with type 1 diabetes mellitus (HCC) 02/13/2018    Dermatofibroma 10/25/2017    Anemia of chronic renal failure 10/04/2017    Paronychia 11/03/2014    Abnormal Pap smear 08/11/2014    Mild nonproliferative diabetic retinopathy (HCC) 07/24/2014    Uveitis 07/14/2014          Hereditary Breast, Ovarian, Colon and Uterine Cancer screening Done.          Tobacco & Secondary smoke risks reviewed; instructed on cessation and avoidance      Counseling Completed:  Preventative Health Recommendations and Follow up.  The patient was informed of the recommended preventative health recommendations.    1. Annuals every year; Cytology collections per prevailing guidelines.   2. Mammograms begin every year at 39 yo if no abnormalities are found and no family history.  3. Bone density studies every 2-3 years. Begin at 66 yo. If no fracture history or osteoporosis family history.(significant).  4. Colonoscopy begin at 46 yo. Repeat every ten years if negative and no family history.  5. Calcium of 3318-2792 mg/day in split dosing  6. Vitamin D 400-800 IU/day  7. All other preventative health recommendations will be managed by the patients Primary care physician.             PLAN:  Return in about 1 year (around 7/1/2025) for annual.  Pap smear collected   Repeat Annual every 1 year  Cervical Cytology Evaluation begins at 21 years old.  If Negative Cytology, Follow-up screening per current guidelines.   Mammograms every 1 year. If 39 yo and last mammogram was negative.  Calcium and Vitamin D dosing reviewed.  Colonoscopy screening reviewed as well as onset for bone density testing.  Birth control and barrier recommendations discussed.  STD counseling and prevention reviewed.  Gardisil counseling completed for all patients 9-46 yo.  Routine health maintenance per patients PCP.  Orders Placed This Encounter

## 2024-07-03 LAB
HPV I/H RISK 4 DNA CVX QL NAA+PROBE: NOT DETECTED
HPV SAMPLE: NORMAL
HPV, INTERPRETATION: NORMAL
HPV16 DNA CVX QL NAA+PROBE: NOT DETECTED
HPV18 DNA CVX QL NAA+PROBE: NOT DETECTED
SPECIMEN DESCRIPTION: NORMAL

## 2024-07-08 LAB — CYTOLOGY REPORT: NORMAL

## 2024-07-09 ENCOUNTER — PATIENT MESSAGE (OUTPATIENT)
Dept: PRIMARY CARE CLINIC | Age: 39
End: 2024-07-09

## 2024-07-09 DIAGNOSIS — Z01.810 PRE-OPERATIVE CARDIOVASCULAR EXAMINATION: Primary | ICD-10-CM

## 2024-07-09 DIAGNOSIS — Z01.818 PRE-TRANSPLANT EVALUATION FOR ESRD (END STAGE RENAL DISEASE): ICD-10-CM

## 2024-07-11 ENCOUNTER — HOSPITAL ENCOUNTER (OUTPATIENT)
Dept: WOMENS IMAGING | Age: 39
Discharge: HOME | End: 2024-07-13
Payer: MEDICARE

## 2024-07-11 VITALS — HEIGHT: 61 IN | WEIGHT: 167 LBS | BODY MASS INDEX: 31.53 KG/M2

## 2024-07-11 DIAGNOSIS — Z12.31 ENCOUNTER FOR SCREENING MAMMOGRAM FOR MALIGNANT NEOPLASM OF BREAST: ICD-10-CM

## 2024-07-11 PROCEDURE — 77063 BREAST TOMOSYNTHESIS BI: CPT

## 2024-07-12 ENCOUNTER — OFFICE VISIT (OUTPATIENT)
Dept: BEHAVIORAL/MENTAL HEALTH CLINIC | Age: 39
End: 2024-07-12
Payer: MEDICAID

## 2024-07-12 DIAGNOSIS — F43.21 ADJUSTMENT DISORDER WITH DEPRESSED MOOD: Primary | ICD-10-CM

## 2024-07-12 PROCEDURE — 90834 PSYTX W PT 45 MINUTES: CPT | Performed by: SOCIAL WORKER

## 2024-07-12 PROCEDURE — 1036F TOBACCO NON-USER: CPT | Performed by: SOCIAL WORKER

## 2024-07-15 ENCOUNTER — TELEPHONE (OUTPATIENT)
Dept: PRIMARY CARE CLINIC | Age: 39
End: 2024-07-15

## 2024-07-15 NOTE — TELEPHONE ENCOUNTER
Please let patient know orders for the stress echo are needing further clarification by the scheduling department.  Please verify exactly what the transplant team at [facility] wants ordered.  They can fax a letter/note stating the exact orders/testing that are needed directly to our office at [phone].

## 2024-07-25 PROBLEM — Z79.4 LONG TERM CURRENT USE OF INSULIN (HCC): Status: ACTIVE | Noted: 2024-06-20

## 2024-07-29 ENCOUNTER — HOSPITAL ENCOUNTER (OUTPATIENT)
Age: 39
Discharge: HOME | End: 2024-07-31
Payer: MEDICARE

## 2024-07-29 DIAGNOSIS — Z01.810 PRE-OPERATIVE CARDIOVASCULAR EXAMINATION: ICD-10-CM

## 2024-07-29 PROCEDURE — 93320 DOPPLER ECHO COMPLETE: CPT | Performed by: INTERNAL MEDICINE

## 2024-07-29 PROCEDURE — 93016 CV STRESS TEST SUPVJ ONLY: CPT | Performed by: INTERNAL MEDICINE

## 2024-07-29 PROCEDURE — 93350 STRESS TTE ONLY: CPT

## 2024-07-29 PROCEDURE — 93017 CV STRESS TEST TRACING ONLY: CPT

## 2024-07-29 PROCEDURE — 93018 CV STRESS TEST I&R ONLY: CPT | Performed by: INTERNAL MEDICINE

## 2024-07-29 PROCEDURE — 93350 STRESS TTE ONLY: CPT | Performed by: INTERNAL MEDICINE

## 2024-07-29 PROCEDURE — 78452 HT MUSCLE IMAGE SPECT MULT: CPT

## 2024-07-30 ENCOUNTER — OFFICE VISIT (OUTPATIENT)
Dept: PRIMARY CARE CLINIC | Age: 39
End: 2024-07-30
Payer: MEDICARE

## 2024-07-30 VITALS
DIASTOLIC BLOOD PRESSURE: 89 MMHG | SYSTOLIC BLOOD PRESSURE: 147 MMHG | HEIGHT: 61 IN | BODY MASS INDEX: 31.75 KG/M2 | HEART RATE: 92 BPM | WEIGHT: 168.2 LBS | OXYGEN SATURATION: 99 %

## 2024-07-30 DIAGNOSIS — E10.65 TYPE 1 DIABETES MELLITUS WITH HYPERGLYCEMIA (HCC): ICD-10-CM

## 2024-07-30 DIAGNOSIS — N18.4 CKD (CHRONIC KIDNEY DISEASE) STAGE 4, GFR 15-29 ML/MIN (HCC): ICD-10-CM

## 2024-07-30 DIAGNOSIS — Z00.00 WELCOME TO MEDICARE PREVENTIVE VISIT: Primary | ICD-10-CM

## 2024-07-30 LAB
STRESS BASELINE DIAS BP: 88 MMHG
STRESS BASELINE HR: 99 BPM
STRESS BASELINE SYS BP: 152 MMHG
STRESS ESTIMATED WORKLOAD: 1 METS
STRESS PEAK DIAS BP: 83 MMHG
STRESS PEAK SYS BP: 191 MMHG
STRESS PERCENT HR ACHIEVED: 83 %
STRESS POST PEAK HR: 150 BPM
STRESS RATE PRESSURE PRODUCT: NORMAL BPM*MMHG
STRESS ST DEPRESSION: 0 MM
STRESS STAGE RECOVERY 1 BP: NORMAL MMHG
STRESS STAGE RECOVERY 1 DURATION: NORMAL MIN:SEC
STRESS STAGE RECOVERY 1 HR: 96 BPM
STRESS TARGET HR: 181 BPM

## 2024-07-30 PROCEDURE — G0439 PPPS, SUBSEQ VISIT: HCPCS | Performed by: NURSE PRACTITIONER

## 2024-07-30 PROCEDURE — 3077F SYST BP >= 140 MM HG: CPT | Performed by: NURSE PRACTITIONER

## 2024-07-30 PROCEDURE — 3044F HG A1C LEVEL LT 7.0%: CPT | Performed by: NURSE PRACTITIONER

## 2024-07-30 PROCEDURE — 3079F DIAST BP 80-89 MM HG: CPT | Performed by: NURSE PRACTITIONER

## 2024-07-30 ASSESSMENT — PATIENT HEALTH QUESTIONNAIRE - PHQ9
SUM OF ALL RESPONSES TO PHQ QUESTIONS 1-9: 1
2. FEELING DOWN, DEPRESSED OR HOPELESS: NOT AT ALL
SUM OF ALL RESPONSES TO PHQ9 QUESTIONS 1 & 2: 1
1. LITTLE INTEREST OR PLEASURE IN DOING THINGS: SEVERAL DAYS
SUM OF ALL RESPONSES TO PHQ QUESTIONS 1-9: 1

## 2024-07-30 NOTE — PATIENT INSTRUCTIONS

## 2024-07-30 NOTE — PROGRESS NOTES
Medicare Annual Wellness Visit    Carli Stoddard is here for Medicare AWV and Follow-up (3mo f/u)    Assessment & Plan   Welcome to Medicare preventive visit  CKD (chronic kidney disease) stage 4, GFR 15-29 ml/min (HCC)  -     [facility]  Type 1 diabetes mellitus with hyperglycemia (HCC)  -     [facility]    Recommendations for Preventive Services Due: see orders and patient instructions/AVS.  Recommended screening schedule for the next 5-10 years is provided to the patient in written form: see Patient Instructions/AVS.     Return in 6 months (on 1/30/2025).     Subjective   The following acute and/or chronic problems were also addressed today:    CKD/Type I DM: On transplant list for dual pancreas/renal transplant throught [facility].  Sees Dr. Obrien locally every 4 weeks.  AVF creation.  Not on HD yet.    Seeing endocrinology at [facility] for management of Type I DM.  On insulin pump.    Mental Health: meeting with Renea (behaviroal health)      Patient's complete Health Risk Assessment and screening values have been reviewed and are found in Flowsheets. The following problems were reviewed today and where indicated follow up appointments were made and/or referrals ordered.    Positive Risk Factor Screenings with Interventions:              Self-assessment of health:  In general, how would you say your health is?: (!) Poor    Interventions:  On transplant list.  Following with specialty providers.  Managing stress/anxiety well.     General HRA Questions:  Select all that apply: (!) Stress, Anger  Interventions - Stress:  Sees behavioral health and believes to be managing well  Interventions - Anger:  Sees behavioral health, managing ok        Abnormal BMI (obese):  Body mass index is 31.78 kg/m². (!) Abnormal  Interventions:  On transplant list          Vision Screen:  Do you have difficulty driving, watching TV, or doing any of your daily activities because of your

## 2024-08-19 ENCOUNTER — OFFICE VISIT (OUTPATIENT)
Dept: VASCULAR SURGERY | Age: 39
End: 2024-08-19

## 2024-08-19 ENCOUNTER — HOSPITAL ENCOUNTER (OUTPATIENT)
Age: 39
Discharge: HOME | End: 2024-08-19
Payer: MEDICARE

## 2024-08-19 VITALS
RESPIRATION RATE: 16 BRPM | HEART RATE: 106 BPM | WEIGHT: 168 LBS | HEIGHT: 61 IN | BODY MASS INDEX: 31.72 KG/M2 | SYSTOLIC BLOOD PRESSURE: 168 MMHG | DIASTOLIC BLOOD PRESSURE: 84 MMHG | OXYGEN SATURATION: 97 %

## 2024-08-19 DIAGNOSIS — E55.9 VITAMIN D DEFICIENCY: ICD-10-CM

## 2024-08-19 DIAGNOSIS — N18.5 SECONDARY DIABETES MELLITUS WITH STAGE 5 CHRONIC KIDNEY DISEASE (HCC): ICD-10-CM

## 2024-08-19 DIAGNOSIS — N18.5 BENIGN HYPERTENSION WITH CKD (CHRONIC KIDNEY DISEASE) STAGE V (HCC): ICD-10-CM

## 2024-08-19 DIAGNOSIS — E13.22 SECONDARY DIABETES MELLITUS WITH STAGE 5 CHRONIC KIDNEY DISEASE (HCC): ICD-10-CM

## 2024-08-19 DIAGNOSIS — N18.5 ANEMIA IN STAGE 5 CHRONIC KIDNEY DISEASE, NOT ON CHRONIC DIALYSIS (HCC): ICD-10-CM

## 2024-08-19 DIAGNOSIS — Z11.59 ENCOUNTER FOR SCREENING FOR OTHER VIRAL DISEASES: ICD-10-CM

## 2024-08-19 DIAGNOSIS — N18.6 END STAGE RENAL DISEASE (HCC): Primary | ICD-10-CM

## 2024-08-19 DIAGNOSIS — N18.5 CKD (CHRONIC KIDNEY DISEASE) STAGE 5, GFR LESS THAN 15 ML/MIN (HCC): ICD-10-CM

## 2024-08-19 DIAGNOSIS — E83.51 HYPOCALCEMIA: ICD-10-CM

## 2024-08-19 DIAGNOSIS — Z13.21 ENCOUNTER FOR VITAMIN DEFICIENCY SCREENING: ICD-10-CM

## 2024-08-19 DIAGNOSIS — I12.0 BENIGN HYPERTENSION WITH CKD (CHRONIC KIDNEY DISEASE) STAGE V (HCC): ICD-10-CM

## 2024-08-19 DIAGNOSIS — D63.1 ANEMIA IN STAGE 5 CHRONIC KIDNEY DISEASE, NOT ON CHRONIC DIALYSIS (HCC): ICD-10-CM

## 2024-08-19 LAB
25(OH)D3 SERPL-MCNC: 28.8 NG/ML (ref 30–100)
ANION GAP SERPL CALCULATED.3IONS-SCNC: 13 MMOL/L (ref 9–17)
BACTERIA URNS QL MICRO: ABNORMAL
BILIRUB UR QL STRIP: NEGATIVE
BUN SERPL-MCNC: 45 MG/DL (ref 6–20)
CALCIUM SERPL-MCNC: 8.3 MG/DL (ref 8.6–10.4)
CASTS #/AREA URNS LPF: ABNORMAL /LPF
CHLORIDE SERPL-SCNC: 105 MMOL/L (ref 98–107)
CLARITY UR: CLEAR
CO2 SERPL-SCNC: 19 MMOL/L (ref 20–31)
COLOR UR: YELLOW
CREAT SERPL-MCNC: 3.6 MG/DL (ref 0.5–0.9)
CREAT UR-MCNC: 97.1 MG/DL (ref 28–217)
EPI CELLS #/AREA URNS HPF: ABNORMAL /HPF
GFR, ESTIMATED: 16 ML/MIN/1.73M2
GLUCOSE UR STRIP-MCNC: NEGATIVE MG/DL
HCT VFR BLD AUTO: 25 % (ref 36–46)
HGB BLD-MCNC: 8.4 G/DL (ref 12–16)
HGB UR QL STRIP.AUTO: NEGATIVE
KETONES UR STRIP-MCNC: NEGATIVE MG/DL
LEUKOCYTE ESTERASE UR QL STRIP: NEGATIVE
MAGNESIUM SERPL-MCNC: 2.1 MG/DL (ref 1.6–2.6)
MICROALBUMIN UR-MCNC: 979 MG/L (ref 0–20)
MICROALBUMIN/CREAT UR-RTO: 1008 MCG/MG CREAT (ref 0–25)
NITRITE UR QL STRIP: NEGATIVE
PH UR STRIP: 5.5 [PH] (ref 5–8)
PHOSPHATE SERPL-MCNC: 4.3 MG/DL (ref 2.6–4.5)
POTASSIUM SERPL-SCNC: 4.3 MMOL/L (ref 3.7–5.3)
PROT UR STRIP-MCNC: ABNORMAL MG/DL
RBC #/AREA URNS HPF: ABNORMAL /HPF
SODIUM SERPL-SCNC: 137 MMOL/L (ref 135–144)
SP GR UR STRIP: 1.01 (ref 1–1.03)
UROBILINOGEN UR STRIP-ACNC: NORMAL EU/DL (ref 0–1)
WBC #/AREA URNS HPF: ABNORMAL /HPF

## 2024-08-19 PROCEDURE — 36415 COLL VENOUS BLD VENIPUNCTURE: CPT

## 2024-08-19 PROCEDURE — 82306 VITAMIN D 25 HYDROXY: CPT

## 2024-08-19 PROCEDURE — 84100 ASSAY OF PHOSPHORUS: CPT

## 2024-08-19 PROCEDURE — 84520 ASSAY OF UREA NITROGEN: CPT

## 2024-08-19 PROCEDURE — 82565 ASSAY OF CREATININE: CPT

## 2024-08-19 PROCEDURE — 85018 HEMOGLOBIN: CPT

## 2024-08-19 PROCEDURE — 99024 POSTOP FOLLOW-UP VISIT: CPT | Performed by: SURGERY

## 2024-08-19 PROCEDURE — 82043 UR ALBUMIN QUANTITATIVE: CPT

## 2024-08-19 PROCEDURE — 85014 HEMATOCRIT: CPT

## 2024-08-19 PROCEDURE — 82570 ASSAY OF URINE CREATININE: CPT

## 2024-08-19 PROCEDURE — 83735 ASSAY OF MAGNESIUM: CPT

## 2024-08-19 PROCEDURE — 81001 URINALYSIS AUTO W/SCOPE: CPT

## 2024-08-19 PROCEDURE — 80051 ELECTROLYTE PANEL: CPT

## 2024-08-19 PROCEDURE — 82310 ASSAY OF CALCIUM: CPT

## 2024-08-19 NOTE — PROGRESS NOTES
[facility], [facility] HEART AND VASCULAR  [address], [place] [facility] [address]  Dept: [phone]     Patient: Carli Stoddard  : 1985  MRN: [id number]  DOS: 2024            HPI:  Carli Stoddard is a 38 y.o. female who returns to the office regarding her end-stage renal disease.  Recall about 8 to 9 weeks ago we had constructed a left distal radiocephalic fistula.  This is actually feeling quite good but has not yet been used.  She is not yet on dialysis and she is on the kidney transplant list.    Review of Systems    Vitals:    24 1123   BP: (!) 168/84   Site: Right Upper Arm   Position: Sitting   Cuff Size: Medium Adult   Pulse: (!) 106   Resp: 16   SpO2: 97%   Weight: 76.2 kg (168 lb)   Height: 1.549 m (5' 1\")          Physical Exam  On examination her left upper extremity has an outstanding thrill in the forearm at and beyond the anastomosis.  There does not seem to be any siphoning flow from sidebranches.  The vein is slightly deep but palpable under the skin.  There is a palpable distal radial pulse.  The hand is warm and well-perfused without thenar or hypothenar wasting and there are no ulcerations.  Assessment:  1. End stage renal disease (HCC)          Plan:  At this time she can use the fistula at any time if needed.  She is not yet on dialysis and hopefully will get a kidney transplant before dialysis is even contemplated.  We can see her back to the office as needed and she knows if she needs my assistance she is certainly welcome back at any time    Electronically signed by:  Herrera Vaughn MD

## 2024-08-20 DIAGNOSIS — I12.9 RENAL HYPERTENSION: Primary | ICD-10-CM

## 2024-08-20 DIAGNOSIS — N18.6 ANEMIA IN END-STAGE RENAL DISEASE (HCC): ICD-10-CM

## 2024-08-20 DIAGNOSIS — D63.1 ANEMIA IN END-STAGE RENAL DISEASE (HCC): ICD-10-CM

## 2024-08-20 DIAGNOSIS — E83.51 HYPOCALCEMIA: ICD-10-CM

## 2024-08-20 DIAGNOSIS — N18.4 CKD (CHRONIC KIDNEY DISEASE), STAGE IV (HCC): ICD-10-CM

## 2024-08-20 DIAGNOSIS — E13.22 SECONDARY DIABETES MELLITUS WITH CHRONIC KIDNEY DISEASE (HCC): ICD-10-CM

## 2024-08-20 RX ORDER — ALBUTEROL SULFATE 90 UG/1
4 AEROSOL, METERED RESPIRATORY (INHALATION) PRN
OUTPATIENT
Start: 2024-08-20

## 2024-08-20 RX ORDER — EPINEPHRINE 1 MG/ML
0.3 INJECTION, SOLUTION, CONCENTRATE INTRAVENOUS PRN
OUTPATIENT
Start: 2024-08-20

## 2024-08-20 RX ORDER — ACETAMINOPHEN 325 MG/1
650 TABLET ORAL
OUTPATIENT
Start: 2024-08-20

## 2024-08-20 RX ORDER — ONDANSETRON 2 MG/ML
8 INJECTION INTRAMUSCULAR; INTRAVENOUS
OUTPATIENT
Start: 2024-08-20

## 2024-08-20 RX ORDER — SODIUM CHLORIDE 9 MG/ML
INJECTION, SOLUTION INTRAVENOUS CONTINUOUS
OUTPATIENT
Start: 2024-08-20

## 2024-08-20 RX ORDER — DIPHENHYDRAMINE HYDROCHLORIDE 50 MG/ML
50 INJECTION INTRAMUSCULAR; INTRAVENOUS
OUTPATIENT
Start: 2024-08-20

## 2024-09-04 ENCOUNTER — HOSPITAL ENCOUNTER (OUTPATIENT)
Dept: INFUSION THERAPY | Age: 39
Setting detail: INFUSION SERIES
Discharge: HOME | End: 2024-09-04
Payer: MEDICARE

## 2024-09-04 VITALS
HEART RATE: 87 BPM | TEMPERATURE: 98 F | DIASTOLIC BLOOD PRESSURE: 82 MMHG | RESPIRATION RATE: 16 BRPM | OXYGEN SATURATION: 100 % | SYSTOLIC BLOOD PRESSURE: 149 MMHG

## 2024-09-04 DIAGNOSIS — N18.6 ANEMIA IN END-STAGE RENAL DISEASE (HCC): ICD-10-CM

## 2024-09-04 DIAGNOSIS — E83.51 HYPOCALCEMIA: Primary | ICD-10-CM

## 2024-09-04 DIAGNOSIS — N18.4 CKD (CHRONIC KIDNEY DISEASE), STAGE IV (HCC): ICD-10-CM

## 2024-09-04 DIAGNOSIS — E13.22 SECONDARY DIABETES MELLITUS WITH CHRONIC KIDNEY DISEASE (HCC): ICD-10-CM

## 2024-09-04 DIAGNOSIS — D63.1 ANEMIA IN END-STAGE RENAL DISEASE (HCC): ICD-10-CM

## 2024-09-04 DIAGNOSIS — I12.9 RENAL HYPERTENSION: ICD-10-CM

## 2024-09-04 LAB
BASOPHILS # BLD: 0.07 K/UL (ref 0–0.2)
BASOPHILS NFR BLD: 1 % (ref 0–2)
EOSINOPHIL # BLD: 0.13 K/UL (ref 0–0.4)
EOSINOPHILS RELATIVE PERCENT: 2 % (ref 0–4)
ERYTHROCYTE [DISTWIDTH] IN BLOOD BY AUTOMATED COUNT: 13.1 % (ref 11.5–14.9)
HCT VFR BLD AUTO: 23.4 % (ref 36–46)
HGB BLD-MCNC: 7.8 G/DL (ref 12–16)
LYMPHOCYTES NFR BLD: 2.28 K/UL (ref 1–4.8)
LYMPHOCYTES RELATIVE PERCENT: 34 % (ref 24–44)
MCH RBC QN AUTO: 28.7 PG (ref 26–34)
MCHC RBC AUTO-ENTMCNC: 33.3 G/DL (ref 31–37)
MCV RBC AUTO: 86.1 FL (ref 80–100)
MONOCYTES NFR BLD: 0.4 K/UL (ref 0.1–1.3)
MONOCYTES NFR BLD: 6 % (ref 1–7)
MORPHOLOGY: ABNORMAL
NEUTROPHILS NFR BLD: 57 % (ref 36–66)
NEUTS SEG NFR BLD: 3.82 K/UL (ref 1.3–9.1)
PLATELET # BLD AUTO: 274 K/UL (ref 150–450)
PMV BLD AUTO: 7.1 FL (ref 6–12)
RBC # BLD AUTO: 2.72 M/UL (ref 4–5.2)
WBC OTHER # BLD: 6.7 K/UL (ref 3.5–11)

## 2024-09-04 PROCEDURE — 85025 COMPLETE CBC W/AUTO DIFF WBC: CPT

## 2024-09-04 PROCEDURE — 6360000002 HC RX W HCPCS: Performed by: INTERNAL MEDICINE

## 2024-09-04 PROCEDURE — 36415 COLL VENOUS BLD VENIPUNCTURE: CPT

## 2024-09-04 PROCEDURE — 96372 THER/PROPH/DIAG INJ SC/IM: CPT

## 2024-09-04 RX ORDER — DIPHENHYDRAMINE HYDROCHLORIDE 50 MG/ML
50 INJECTION INTRAMUSCULAR; INTRAVENOUS
OUTPATIENT
Start: 2024-09-08

## 2024-09-04 RX ORDER — ACETAMINOPHEN 325 MG/1
650 TABLET ORAL
OUTPATIENT
Start: 2024-09-08

## 2024-09-04 RX ORDER — ALBUTEROL SULFATE 90 UG/1
4 AEROSOL, METERED RESPIRATORY (INHALATION) PRN
OUTPATIENT
Start: 2024-09-08

## 2024-09-04 RX ORDER — ONDANSETRON 2 MG/ML
8 INJECTION INTRAMUSCULAR; INTRAVENOUS
OUTPATIENT
Start: 2024-09-08

## 2024-09-04 RX ORDER — SODIUM CHLORIDE 9 MG/ML
INJECTION, SOLUTION INTRAVENOUS CONTINUOUS
OUTPATIENT
Start: 2024-09-08

## 2024-09-04 RX ORDER — EPINEPHRINE 1 MG/ML
0.3 INJECTION, SOLUTION, CONCENTRATE INTRAVENOUS PRN
OUTPATIENT
Start: 2024-09-08

## 2024-09-04 RX ADMIN — DARBEPOETIN ALFA 25 MCG: 25 INJECTION, SOLUTION INTRAVENOUS; SUBCUTANEOUS at 13:55

## 2024-09-04 NOTE — PROGRESS NOTES
Pt arrived for aranesp injection.  Vitals obtained and labs drawn.  Hgb=7.8 therefore injection indicated per written parameters.  Injection given in R arm.   Pt tolerated well.  No s/s adverse reaction noted.  Pt discharged home, ambulatory per self.

## 2024-09-11 ENCOUNTER — HOSPITAL ENCOUNTER (OUTPATIENT)
Dept: INFUSION THERAPY | Age: 39
Setting detail: INFUSION SERIES
Discharge: HOME | End: 2024-09-11
Payer: MEDICARE

## 2024-09-11 VITALS
HEART RATE: 89 BPM | OXYGEN SATURATION: 100 % | SYSTOLIC BLOOD PRESSURE: 145 MMHG | DIASTOLIC BLOOD PRESSURE: 84 MMHG | RESPIRATION RATE: 17 BRPM | TEMPERATURE: 97.3 F

## 2024-09-11 DIAGNOSIS — E83.51 HYPOCALCEMIA: Primary | ICD-10-CM

## 2024-09-11 DIAGNOSIS — I12.9 RENAL HYPERTENSION: ICD-10-CM

## 2024-09-11 DIAGNOSIS — N18.4 CKD (CHRONIC KIDNEY DISEASE), STAGE IV (HCC): ICD-10-CM

## 2024-09-11 DIAGNOSIS — D63.1 ANEMIA IN END-STAGE RENAL DISEASE (HCC): ICD-10-CM

## 2024-09-11 DIAGNOSIS — N18.6 ANEMIA IN END-STAGE RENAL DISEASE (HCC): ICD-10-CM

## 2024-09-11 DIAGNOSIS — E13.22 SECONDARY DIABETES MELLITUS WITH CHRONIC KIDNEY DISEASE (HCC): ICD-10-CM

## 2024-09-11 LAB
BASOPHILS # BLD: 0.1 K/UL (ref 0–0.2)
BASOPHILS NFR BLD: 1 % (ref 0–2)
EOSINOPHIL # BLD: 0.1 K/UL (ref 0–0.4)
EOSINOPHILS RELATIVE PERCENT: 2 % (ref 0–4)
ERYTHROCYTE [DISTWIDTH] IN BLOOD BY AUTOMATED COUNT: 13.3 % (ref 11.5–14.9)
HCT VFR BLD AUTO: 24.8 % (ref 36–46)
HGB BLD-MCNC: 8.1 G/DL (ref 12–16)
LYMPHOCYTES NFR BLD: 2.6 K/UL (ref 1–4.8)
LYMPHOCYTES RELATIVE PERCENT: 38 % (ref 24–44)
MCH RBC QN AUTO: 28 PG (ref 26–34)
MCHC RBC AUTO-ENTMCNC: 32.6 G/DL (ref 31–37)
MCV RBC AUTO: 86.1 FL (ref 80–100)
MONOCYTES NFR BLD: 0.4 K/UL (ref 0.1–1.3)
MONOCYTES NFR BLD: 6 % (ref 1–7)
NEUTROPHILS NFR BLD: 53 % (ref 36–66)
NEUTS SEG NFR BLD: 3.6 K/UL (ref 1.3–9.1)
PLATELET # BLD AUTO: 300 K/UL (ref 150–450)
PMV BLD AUTO: 7.2 FL (ref 6–12)
RBC # BLD AUTO: 2.88 M/UL (ref 4–5.2)
WBC OTHER # BLD: 6.7 K/UL (ref 3.5–11)

## 2024-09-11 PROCEDURE — 85025 COMPLETE CBC W/AUTO DIFF WBC: CPT

## 2024-09-11 PROCEDURE — 36415 COLL VENOUS BLD VENIPUNCTURE: CPT

## 2024-09-11 PROCEDURE — 96372 THER/PROPH/DIAG INJ SC/IM: CPT

## 2024-09-11 PROCEDURE — 6360000002 HC RX W HCPCS: Performed by: INTERNAL MEDICINE

## 2024-09-11 RX ORDER — ACETAMINOPHEN 325 MG/1
650 TABLET ORAL
OUTPATIENT
Start: 2024-09-18

## 2024-09-11 RX ORDER — EPINEPHRINE 1 MG/ML
0.3 INJECTION, SOLUTION, CONCENTRATE INTRAVENOUS PRN
OUTPATIENT
Start: 2024-09-18

## 2024-09-11 RX ORDER — SODIUM CHLORIDE 9 MG/ML
INJECTION, SOLUTION INTRAVENOUS CONTINUOUS
OUTPATIENT
Start: 2024-09-18

## 2024-09-11 RX ORDER — ALBUTEROL SULFATE 90 UG/1
4 AEROSOL, METERED RESPIRATORY (INHALATION) PRN
OUTPATIENT
Start: 2024-09-18

## 2024-09-11 RX ORDER — ONDANSETRON 2 MG/ML
8 INJECTION INTRAMUSCULAR; INTRAVENOUS
OUTPATIENT
Start: 2024-09-18

## 2024-09-11 RX ORDER — DIPHENHYDRAMINE HYDROCHLORIDE 50 MG/ML
50 INJECTION INTRAMUSCULAR; INTRAVENOUS
OUTPATIENT
Start: 2024-09-18

## 2024-09-11 RX ADMIN — DARBEPOETIN ALFA 25 MCG: 25 INJECTION, SOLUTION INTRAVENOUS; SUBCUTANEOUS at 13:44

## 2024-09-19 ENCOUNTER — HOSPITAL ENCOUNTER (OUTPATIENT)
Age: 39
Discharge: HOME | End: 2024-09-19
Payer: MEDICARE

## 2024-09-19 ENCOUNTER — HOSPITAL ENCOUNTER (OUTPATIENT)
Dept: INFUSION THERAPY | Age: 39
Setting detail: INFUSION SERIES
Discharge: HOME | End: 2024-09-19
Payer: MEDICARE

## 2024-09-19 VITALS
DIASTOLIC BLOOD PRESSURE: 81 MMHG | TEMPERATURE: 97.8 F | HEART RATE: 85 BPM | RESPIRATION RATE: 16 BRPM | SYSTOLIC BLOOD PRESSURE: 130 MMHG | OXYGEN SATURATION: 99 %

## 2024-09-19 DIAGNOSIS — E13.22 SECONDARY DIABETES MELLITUS WITH CHRONIC KIDNEY DISEASE (HCC): ICD-10-CM

## 2024-09-19 DIAGNOSIS — N18.4 CKD (CHRONIC KIDNEY DISEASE), STAGE IV (HCC): ICD-10-CM

## 2024-09-19 DIAGNOSIS — D63.1 ANEMIA IN END-STAGE RENAL DISEASE (HCC): ICD-10-CM

## 2024-09-19 DIAGNOSIS — E83.51 HYPOCALCEMIA: Primary | ICD-10-CM

## 2024-09-19 DIAGNOSIS — E83.51 HYPOCALCEMIA: ICD-10-CM

## 2024-09-19 DIAGNOSIS — I12.9 RENAL HYPERTENSION: ICD-10-CM

## 2024-09-19 DIAGNOSIS — N18.6 ANEMIA IN END-STAGE RENAL DISEASE (HCC): ICD-10-CM

## 2024-09-19 LAB
BASOPHILS # BLD: 0.1 K/UL (ref 0–0.2)
BASOPHILS NFR BLD: 1 % (ref 0–2)
EOSINOPHIL # BLD: 0.1 K/UL (ref 0–0.4)
EOSINOPHILS RELATIVE PERCENT: 2 % (ref 0–4)
ERYTHROCYTE [DISTWIDTH] IN BLOOD BY AUTOMATED COUNT: 13.6 % (ref 11.5–14.9)
HCT VFR BLD AUTO: 27.2 % (ref 36–46)
HGB BLD-MCNC: 8.8 G/DL (ref 12–16)
LYMPHOCYTES NFR BLD: 2.6 K/UL (ref 1–4.8)
LYMPHOCYTES RELATIVE PERCENT: 35 % (ref 24–44)
MCH RBC QN AUTO: 28.4 PG (ref 26–34)
MCHC RBC AUTO-ENTMCNC: 32.4 G/DL (ref 31–37)
MCV RBC AUTO: 87.8 FL (ref 80–100)
MONOCYTES NFR BLD: 0.4 K/UL (ref 0.1–1.3)
MONOCYTES NFR BLD: 5 % (ref 1–7)
NEUTROPHILS NFR BLD: 57 % (ref 36–66)
NEUTS SEG NFR BLD: 4.3 K/UL (ref 1.3–9.1)
PLATELET # BLD AUTO: 305 K/UL (ref 150–450)
PMV BLD AUTO: 7.3 FL (ref 6–12)
RBC # BLD AUTO: 3.1 M/UL (ref 4–5.2)
WBC OTHER # BLD: 7.5 K/UL (ref 3.5–11)

## 2024-09-19 PROCEDURE — 85025 COMPLETE CBC W/AUTO DIFF WBC: CPT

## 2024-09-19 PROCEDURE — 96372 THER/PROPH/DIAG INJ SC/IM: CPT

## 2024-09-19 PROCEDURE — 36415 COLL VENOUS BLD VENIPUNCTURE: CPT

## 2024-09-19 PROCEDURE — 6360000002 HC RX W HCPCS: Performed by: INTERNAL MEDICINE

## 2024-09-19 RX ORDER — ACETAMINOPHEN 325 MG/1
650 TABLET ORAL
OUTPATIENT
Start: 2024-09-25

## 2024-09-19 RX ORDER — EPINEPHRINE 1 MG/ML
0.3 INJECTION, SOLUTION, CONCENTRATE INTRAVENOUS PRN
OUTPATIENT
Start: 2024-09-25

## 2024-09-19 RX ORDER — ALBUTEROL SULFATE 90 UG/1
4 INHALANT RESPIRATORY (INHALATION) PRN
OUTPATIENT
Start: 2024-09-25

## 2024-09-19 RX ORDER — DIPHENHYDRAMINE HYDROCHLORIDE 50 MG/ML
50 INJECTION INTRAMUSCULAR; INTRAVENOUS
OUTPATIENT
Start: 2024-09-25

## 2024-09-19 RX ORDER — ONDANSETRON 2 MG/ML
8 INJECTION INTRAMUSCULAR; INTRAVENOUS
OUTPATIENT
Start: 2024-09-25

## 2024-09-19 RX ORDER — SODIUM CHLORIDE 9 MG/ML
INJECTION, SOLUTION INTRAVENOUS CONTINUOUS
OUTPATIENT
Start: 2024-09-25

## 2024-09-19 RX ADMIN — DARBEPOETIN ALFA 25 MCG: 25 INJECTION, SOLUTION INTRAVENOUS; SUBCUTANEOUS at 15:12

## 2024-09-25 ENCOUNTER — HOSPITAL ENCOUNTER (OUTPATIENT)
Dept: INFUSION THERAPY | Age: 39
Setting detail: INFUSION SERIES
Discharge: HOME | End: 2024-09-25
Payer: MEDICARE

## 2024-09-25 VITALS
HEART RATE: 85 BPM | SYSTOLIC BLOOD PRESSURE: 131 MMHG | TEMPERATURE: 96.8 F | OXYGEN SATURATION: 98 % | DIASTOLIC BLOOD PRESSURE: 80 MMHG | RESPIRATION RATE: 16 BRPM

## 2024-09-25 DIAGNOSIS — D63.1 ANEMIA IN END-STAGE RENAL DISEASE (HCC): ICD-10-CM

## 2024-09-25 DIAGNOSIS — N18.4 CKD (CHRONIC KIDNEY DISEASE), STAGE IV (HCC): ICD-10-CM

## 2024-09-25 DIAGNOSIS — E13.22 SECONDARY DIABETES MELLITUS WITH CHRONIC KIDNEY DISEASE (HCC): ICD-10-CM

## 2024-09-25 DIAGNOSIS — N18.6 ANEMIA IN END-STAGE RENAL DISEASE (HCC): ICD-10-CM

## 2024-09-25 DIAGNOSIS — E83.51 HYPOCALCEMIA: Primary | ICD-10-CM

## 2024-09-25 DIAGNOSIS — I12.9 RENAL HYPERTENSION: ICD-10-CM

## 2024-09-25 LAB
BASOPHILS # BLD: 0 K/UL (ref 0–0.2)
BASOPHILS NFR BLD: 1 % (ref 0–2)
EOSINOPHIL # BLD: 0.1 K/UL (ref 0–0.4)
EOSINOPHILS RELATIVE PERCENT: 1 % (ref 0–4)
ERYTHROCYTE [DISTWIDTH] IN BLOOD BY AUTOMATED COUNT: 13.6 % (ref 11.5–14.9)
HCT VFR BLD AUTO: 24.9 % (ref 36–46)
HGB BLD-MCNC: 8.2 G/DL (ref 12–16)
LYMPHOCYTES NFR BLD: 2.1 K/UL (ref 1–4.8)
LYMPHOCYTES RELATIVE PERCENT: 24 % (ref 24–44)
MCH RBC QN AUTO: 28.5 PG (ref 26–34)
MCHC RBC AUTO-ENTMCNC: 33.1 G/DL (ref 31–37)
MCV RBC AUTO: 86.2 FL (ref 80–100)
MONOCYTES NFR BLD: 0.3 K/UL (ref 0.1–1.3)
MONOCYTES NFR BLD: 4 % (ref 1–7)
NEUTROPHILS NFR BLD: 70 % (ref 36–66)
NEUTS SEG NFR BLD: 6.4 K/UL (ref 1.3–9.1)
PLATELET # BLD AUTO: 275 K/UL (ref 150–450)
PMV BLD AUTO: 6.9 FL (ref 6–12)
RBC # BLD AUTO: 2.89 M/UL (ref 4–5.2)
WBC OTHER # BLD: 9 K/UL (ref 3.5–11)

## 2024-09-25 PROCEDURE — 85025 COMPLETE CBC W/AUTO DIFF WBC: CPT

## 2024-09-25 PROCEDURE — 96372 THER/PROPH/DIAG INJ SC/IM: CPT

## 2024-09-25 PROCEDURE — 6360000002 HC RX W HCPCS: Performed by: INTERNAL MEDICINE

## 2024-09-25 PROCEDURE — 36415 COLL VENOUS BLD VENIPUNCTURE: CPT

## 2024-09-25 RX ORDER — SODIUM CHLORIDE 9 MG/ML
INJECTION, SOLUTION INTRAVENOUS CONTINUOUS
OUTPATIENT
Start: 2024-10-02

## 2024-09-25 RX ORDER — ACETAMINOPHEN 325 MG/1
650 TABLET ORAL
OUTPATIENT
Start: 2024-10-02

## 2024-09-25 RX ORDER — EPINEPHRINE 1 MG/ML
0.3 INJECTION, SOLUTION, CONCENTRATE INTRAVENOUS PRN
OUTPATIENT
Start: 2024-10-02

## 2024-09-25 RX ORDER — ONDANSETRON 2 MG/ML
8 INJECTION INTRAMUSCULAR; INTRAVENOUS
OUTPATIENT
Start: 2024-10-02

## 2024-09-25 RX ORDER — DIPHENHYDRAMINE HYDROCHLORIDE 50 MG/ML
50 INJECTION INTRAMUSCULAR; INTRAVENOUS
OUTPATIENT
Start: 2024-10-02

## 2024-09-25 RX ORDER — ALBUTEROL SULFATE 90 UG/1
4 INHALANT RESPIRATORY (INHALATION) PRN
OUTPATIENT
Start: 2024-10-02

## 2024-09-25 RX ADMIN — DARBEPOETIN ALFA 25 MCG: 25 INJECTION, SOLUTION INTRAVENOUS; SUBCUTANEOUS at 13:19

## 2024-10-02 ENCOUNTER — HOSPITAL ENCOUNTER (OUTPATIENT)
Dept: INFUSION THERAPY | Age: 39
Setting detail: INFUSION SERIES
Discharge: HOME | End: 2024-10-02
Payer: MEDICARE

## 2024-10-02 VITALS
HEART RATE: 88 BPM | RESPIRATION RATE: 17 BRPM | DIASTOLIC BLOOD PRESSURE: 79 MMHG | TEMPERATURE: 97.9 F | OXYGEN SATURATION: 98 % | SYSTOLIC BLOOD PRESSURE: 123 MMHG

## 2024-10-02 DIAGNOSIS — E13.22 SECONDARY DIABETES MELLITUS WITH CHRONIC KIDNEY DISEASE (HCC): ICD-10-CM

## 2024-10-02 DIAGNOSIS — N18.6 ANEMIA IN END-STAGE RENAL DISEASE (HCC): ICD-10-CM

## 2024-10-02 DIAGNOSIS — N18.4 CKD (CHRONIC KIDNEY DISEASE), STAGE IV (HCC): ICD-10-CM

## 2024-10-02 DIAGNOSIS — E83.51 HYPOCALCEMIA: ICD-10-CM

## 2024-10-02 DIAGNOSIS — D63.1 ANEMIA IN END-STAGE RENAL DISEASE (HCC): ICD-10-CM

## 2024-10-02 DIAGNOSIS — I12.9 RENAL HYPERTENSION: Primary | ICD-10-CM

## 2024-10-02 LAB
BASOPHILS # BLD: 0.1 K/UL (ref 0–0.2)
BASOPHILS NFR BLD: 1 % (ref 0–2)
EOSINOPHIL # BLD: 0.1 K/UL (ref 0–0.4)
EOSINOPHILS RELATIVE PERCENT: 2 % (ref 0–4)
ERYTHROCYTE [DISTWIDTH] IN BLOOD BY AUTOMATED COUNT: 13.5 % (ref 11.5–14.9)
HCT VFR BLD AUTO: 25.4 % (ref 36–46)
HGB BLD-MCNC: 8.5 G/DL (ref 12–16)
LYMPHOCYTES NFR BLD: 1.9 K/UL (ref 1–4.8)
LYMPHOCYTES RELATIVE PERCENT: 28 % (ref 24–44)
MCH RBC QN AUTO: 28.8 PG (ref 26–34)
MCHC RBC AUTO-ENTMCNC: 33.6 G/DL (ref 31–37)
MCV RBC AUTO: 85.7 FL (ref 80–100)
MONOCYTES NFR BLD: 0.5 K/UL (ref 0.1–1.3)
MONOCYTES NFR BLD: 7 % (ref 1–7)
NEUTROPHILS NFR BLD: 62 % (ref 36–66)
NEUTS SEG NFR BLD: 4.3 K/UL (ref 1.3–9.1)
PLATELET # BLD AUTO: 291 K/UL (ref 150–450)
PMV BLD AUTO: 6.7 FL (ref 6–12)
RBC # BLD AUTO: 2.97 M/UL (ref 4–5.2)
WBC OTHER # BLD: 7 K/UL (ref 3.5–11)

## 2024-10-02 PROCEDURE — 85025 COMPLETE CBC W/AUTO DIFF WBC: CPT

## 2024-10-02 PROCEDURE — 6360000002 HC RX W HCPCS: Performed by: INTERNAL MEDICINE

## 2024-10-02 PROCEDURE — 96372 THER/PROPH/DIAG INJ SC/IM: CPT

## 2024-10-02 PROCEDURE — 36415 COLL VENOUS BLD VENIPUNCTURE: CPT

## 2024-10-02 RX ORDER — ACETAMINOPHEN 325 MG/1
650 TABLET ORAL
OUTPATIENT
Start: 2024-10-09

## 2024-10-02 RX ORDER — EPINEPHRINE 1 MG/ML
0.3 INJECTION, SOLUTION, CONCENTRATE INTRAVENOUS PRN
OUTPATIENT
Start: 2024-10-09

## 2024-10-02 RX ORDER — ONDANSETRON 2 MG/ML
8 INJECTION INTRAMUSCULAR; INTRAVENOUS
OUTPATIENT
Start: 2024-10-09

## 2024-10-02 RX ORDER — SODIUM CHLORIDE 9 MG/ML
INJECTION, SOLUTION INTRAVENOUS CONTINUOUS
OUTPATIENT
Start: 2024-10-09

## 2024-10-02 RX ORDER — DIPHENHYDRAMINE HYDROCHLORIDE 50 MG/ML
50 INJECTION INTRAMUSCULAR; INTRAVENOUS
OUTPATIENT
Start: 2024-10-09

## 2024-10-02 RX ORDER — ALBUTEROL SULFATE 90 UG/1
4 INHALANT RESPIRATORY (INHALATION) PRN
OUTPATIENT
Start: 2024-10-09

## 2024-10-02 RX ADMIN — DARBEPOETIN ALFA 25 MCG: 25 INJECTION, SOLUTION INTRAVENOUS; SUBCUTANEOUS at 13:13

## 2024-10-02 NOTE — PROGRESS NOTES
Pt arrived for Aranesp injection.  Vitals obtained and labs drawn.  Hgb=8.5, therefore injection indicated per written parameters.  Injection given in R arm.   Pt tolerated well.  No s/s adverse reaction noted.  Pt discharged home, ambulatory per self.

## 2024-10-09 ENCOUNTER — HOSPITAL ENCOUNTER (OUTPATIENT)
Dept: INFUSION THERAPY | Age: 39
Setting detail: INFUSION SERIES
Discharge: HOME | End: 2024-10-09
Payer: MEDICARE

## 2024-10-09 VITALS
RESPIRATION RATE: 16 BRPM | HEART RATE: 85 BPM | TEMPERATURE: 98 F | DIASTOLIC BLOOD PRESSURE: 91 MMHG | OXYGEN SATURATION: 100 % | SYSTOLIC BLOOD PRESSURE: 167 MMHG

## 2024-10-09 DIAGNOSIS — E83.51 HYPOCALCEMIA: Primary | ICD-10-CM

## 2024-10-09 DIAGNOSIS — D63.1 ANEMIA IN END-STAGE RENAL DISEASE (HCC): ICD-10-CM

## 2024-10-09 DIAGNOSIS — N18.6 ANEMIA IN END-STAGE RENAL DISEASE (HCC): ICD-10-CM

## 2024-10-09 DIAGNOSIS — N18.4 CKD (CHRONIC KIDNEY DISEASE), STAGE IV (HCC): ICD-10-CM

## 2024-10-09 DIAGNOSIS — E13.22 SECONDARY DIABETES MELLITUS WITH CHRONIC KIDNEY DISEASE (HCC): ICD-10-CM

## 2024-10-09 DIAGNOSIS — I12.9 RENAL HYPERTENSION: ICD-10-CM

## 2024-10-09 LAB
BASOPHILS # BLD: 0.1 K/UL (ref 0–0.2)
BASOPHILS NFR BLD: 1 % (ref 0–2)
EOSINOPHIL # BLD: 0.1 K/UL (ref 0–0.4)
EOSINOPHILS RELATIVE PERCENT: 2 % (ref 0–4)
ERYTHROCYTE [DISTWIDTH] IN BLOOD BY AUTOMATED COUNT: 13.3 % (ref 11.5–14.9)
HCT VFR BLD AUTO: 25.5 % (ref 36–46)
HGB BLD-MCNC: 8.6 G/DL (ref 12–16)
LYMPHOCYTES NFR BLD: 2.2 K/UL (ref 1–4.8)
LYMPHOCYTES RELATIVE PERCENT: 28 % (ref 24–44)
MCH RBC QN AUTO: 28.2 PG (ref 26–34)
MCHC RBC AUTO-ENTMCNC: 33.8 G/DL (ref 31–37)
MCV RBC AUTO: 83.4 FL (ref 80–100)
MONOCYTES NFR BLD: 0.4 K/UL (ref 0.1–1.3)
MONOCYTES NFR BLD: 5 % (ref 1–7)
NEUTROPHILS NFR BLD: 64 % (ref 36–66)
NEUTS SEG NFR BLD: 5 K/UL (ref 1.3–9.1)
PLATELET # BLD AUTO: 314 K/UL (ref 150–450)
PMV BLD AUTO: 6.7 FL (ref 6–12)
RBC # BLD AUTO: 3.06 M/UL (ref 4–5.2)
WBC OTHER # BLD: 7.8 K/UL (ref 3.5–11)

## 2024-10-09 PROCEDURE — 6360000002 HC RX W HCPCS: Performed by: INTERNAL MEDICINE

## 2024-10-09 PROCEDURE — 85025 COMPLETE CBC W/AUTO DIFF WBC: CPT

## 2024-10-09 PROCEDURE — 36415 COLL VENOUS BLD VENIPUNCTURE: CPT

## 2024-10-09 PROCEDURE — 96372 THER/PROPH/DIAG INJ SC/IM: CPT

## 2024-10-09 RX ORDER — EPINEPHRINE 1 MG/ML
0.3 INJECTION, SOLUTION, CONCENTRATE INTRAVENOUS PRN
OUTPATIENT
Start: 2024-10-16

## 2024-10-09 RX ORDER — ONDANSETRON 2 MG/ML
8 INJECTION INTRAMUSCULAR; INTRAVENOUS
OUTPATIENT
Start: 2024-10-16

## 2024-10-09 RX ORDER — ALBUTEROL SULFATE 90 UG/1
4 INHALANT RESPIRATORY (INHALATION) PRN
OUTPATIENT
Start: 2024-10-16

## 2024-10-09 RX ORDER — ACETAMINOPHEN 325 MG/1
650 TABLET ORAL
OUTPATIENT
Start: 2024-10-16

## 2024-10-09 RX ORDER — DIPHENHYDRAMINE HYDROCHLORIDE 50 MG/ML
50 INJECTION INTRAMUSCULAR; INTRAVENOUS
OUTPATIENT
Start: 2024-10-16

## 2024-10-09 RX ORDER — SODIUM CHLORIDE 9 MG/ML
INJECTION, SOLUTION INTRAVENOUS CONTINUOUS
OUTPATIENT
Start: 2024-10-16

## 2024-10-09 RX ADMIN — DARBEPOETIN ALFA 25 MCG: 25 INJECTION, SOLUTION INTRAVENOUS; SUBCUTANEOUS at 13:37

## 2024-10-09 NOTE — PROGRESS NOTES
Pt arrived for aranesp injection.  Vitals obtained and labs drawn.  Hgb=8.6, therefore injection indicated per written parameters.  Injection given in L arm.   Pt tolerated well.  No s/s adverse reaction noted.  Pt discharged home, ambulatory per self.

## 2024-10-16 ENCOUNTER — HOSPITAL ENCOUNTER (OUTPATIENT)
Dept: INFUSION THERAPY | Age: 39
Setting detail: INFUSION SERIES
Discharge: HOME | End: 2024-10-16
Payer: MEDICARE

## 2024-10-16 VITALS
RESPIRATION RATE: 18 BRPM | OXYGEN SATURATION: 100 % | SYSTOLIC BLOOD PRESSURE: 163 MMHG | HEART RATE: 88 BPM | TEMPERATURE: 97.7 F | DIASTOLIC BLOOD PRESSURE: 84 MMHG

## 2024-10-16 DIAGNOSIS — E83.51 HYPOCALCEMIA: ICD-10-CM

## 2024-10-16 DIAGNOSIS — N18.4 CKD (CHRONIC KIDNEY DISEASE), STAGE IV (HCC): ICD-10-CM

## 2024-10-16 DIAGNOSIS — I12.9 RENAL HYPERTENSION: Primary | ICD-10-CM

## 2024-10-16 DIAGNOSIS — N18.6 ANEMIA IN END-STAGE RENAL DISEASE (HCC): ICD-10-CM

## 2024-10-16 DIAGNOSIS — D63.1 ANEMIA IN END-STAGE RENAL DISEASE (HCC): ICD-10-CM

## 2024-10-16 DIAGNOSIS — E13.22 SECONDARY DIABETES MELLITUS WITH CHRONIC KIDNEY DISEASE (HCC): ICD-10-CM

## 2024-10-16 LAB
BASOPHILS # BLD: 0.1 K/UL (ref 0–0.2)
BASOPHILS NFR BLD: 1 % (ref 0–2)
EOSINOPHIL # BLD: 0 K/UL (ref 0–0.4)
EOSINOPHILS RELATIVE PERCENT: 0 % (ref 0–4)
ERYTHROCYTE [DISTWIDTH] IN BLOOD BY AUTOMATED COUNT: 13.9 % (ref 11.5–14.9)
HCT VFR BLD AUTO: 29.7 % (ref 36–46)
HGB BLD-MCNC: 9.8 G/DL (ref 12–16)
LYMPHOCYTES NFR BLD: 2.2 K/UL (ref 1–4.8)
LYMPHOCYTES RELATIVE PERCENT: 28 % (ref 24–44)
MCH RBC QN AUTO: 28.1 PG (ref 26–34)
MCHC RBC AUTO-ENTMCNC: 33.1 G/DL (ref 31–37)
MCV RBC AUTO: 84.9 FL (ref 80–100)
MONOCYTES NFR BLD: 0.4 K/UL (ref 0.1–1.3)
MONOCYTES NFR BLD: 5 % (ref 1–7)
NEUTROPHILS NFR BLD: 66 % (ref 36–66)
NEUTS SEG NFR BLD: 5.1 K/UL (ref 1.3–9.1)
PLATELET # BLD AUTO: 393 K/UL (ref 150–450)
PMV BLD AUTO: 7.4 FL (ref 6–12)
RBC # BLD AUTO: 3.5 M/UL (ref 4–5.2)
WBC OTHER # BLD: 7.8 K/UL (ref 3.5–11)

## 2024-10-16 PROCEDURE — 36415 COLL VENOUS BLD VENIPUNCTURE: CPT

## 2024-10-16 PROCEDURE — 96372 THER/PROPH/DIAG INJ SC/IM: CPT

## 2024-10-16 PROCEDURE — 6360000002 HC RX W HCPCS: Performed by: INTERNAL MEDICINE

## 2024-10-16 PROCEDURE — 85025 COMPLETE CBC W/AUTO DIFF WBC: CPT

## 2024-10-16 RX ORDER — DIPHENHYDRAMINE HYDROCHLORIDE 50 MG/ML
50 INJECTION INTRAMUSCULAR; INTRAVENOUS
OUTPATIENT
Start: 2024-10-23

## 2024-10-16 RX ORDER — SODIUM CHLORIDE 9 MG/ML
INJECTION, SOLUTION INTRAVENOUS CONTINUOUS
OUTPATIENT
Start: 2024-10-23

## 2024-10-16 RX ORDER — ONDANSETRON 2 MG/ML
8 INJECTION INTRAMUSCULAR; INTRAVENOUS
OUTPATIENT
Start: 2024-10-23

## 2024-10-16 RX ORDER — EPINEPHRINE 1 MG/ML
0.3 INJECTION, SOLUTION, CONCENTRATE INTRAVENOUS PRN
OUTPATIENT
Start: 2024-10-23

## 2024-10-16 RX ORDER — ACETAMINOPHEN 325 MG/1
650 TABLET ORAL
OUTPATIENT
Start: 2024-10-23

## 2024-10-16 RX ORDER — ALBUTEROL SULFATE 90 UG/1
4 INHALANT RESPIRATORY (INHALATION) PRN
OUTPATIENT
Start: 2024-10-23

## 2024-10-16 RX ADMIN — DARBEPOETIN ALFA 25 MCG: 25 INJECTION, SOLUTION INTRAVENOUS; SUBCUTANEOUS at 14:41

## 2024-10-16 NOTE — PROGRESS NOTES
Pt arrived for aranesp injection.  Vitals obtained and labs drawn.  Hgb=9.8, therefore injection indicated per written parameters.  Injection given in R arm.   Pt tolerated well.  No s/s adverse reaction noted.  Pt discharged home, ambulatory per self.

## 2024-10-23 ENCOUNTER — HOSPITAL ENCOUNTER (OUTPATIENT)
Dept: INFUSION THERAPY | Age: 39
Setting detail: INFUSION SERIES
Discharge: HOME | End: 2024-10-23
Payer: MEDICARE

## 2024-10-23 VITALS
RESPIRATION RATE: 16 BRPM | OXYGEN SATURATION: 98 % | SYSTOLIC BLOOD PRESSURE: 144 MMHG | HEART RATE: 88 BPM | TEMPERATURE: 97 F | DIASTOLIC BLOOD PRESSURE: 81 MMHG

## 2024-10-23 DIAGNOSIS — E13.22 SECONDARY DIABETES MELLITUS WITH CHRONIC KIDNEY DISEASE (HCC): ICD-10-CM

## 2024-10-23 DIAGNOSIS — D63.1 ANEMIA IN END-STAGE RENAL DISEASE (HCC): ICD-10-CM

## 2024-10-23 DIAGNOSIS — I12.9 RENAL HYPERTENSION: ICD-10-CM

## 2024-10-23 DIAGNOSIS — E83.51 HYPOCALCEMIA: Primary | ICD-10-CM

## 2024-10-23 DIAGNOSIS — N18.6 ANEMIA IN END-STAGE RENAL DISEASE (HCC): ICD-10-CM

## 2024-10-23 DIAGNOSIS — N18.4 CKD (CHRONIC KIDNEY DISEASE), STAGE IV (HCC): ICD-10-CM

## 2024-10-23 LAB
BASOPHILS # BLD: 0.1 K/UL (ref 0–0.2)
BASOPHILS NFR BLD: 1 % (ref 0–2)
EOSINOPHIL # BLD: 0.1 K/UL (ref 0–0.4)
EOSINOPHILS RELATIVE PERCENT: 2 % (ref 0–4)
ERYTHROCYTE [DISTWIDTH] IN BLOOD BY AUTOMATED COUNT: 13.9 % (ref 11.5–14.9)
HCT VFR BLD AUTO: 29.5 % (ref 36–46)
HGB BLD-MCNC: 9.6 G/DL (ref 12–16)
LYMPHOCYTES NFR BLD: 2.5 K/UL (ref 1–4.8)
LYMPHOCYTES RELATIVE PERCENT: 35 % (ref 24–44)
MCH RBC QN AUTO: 28.1 PG (ref 26–34)
MCHC RBC AUTO-ENTMCNC: 32.6 G/DL (ref 31–37)
MCV RBC AUTO: 86.1 FL (ref 80–100)
MONOCYTES NFR BLD: 0.5 K/UL (ref 0.1–1.3)
MONOCYTES NFR BLD: 7 % (ref 1–7)
NEUTROPHILS NFR BLD: 55 % (ref 36–66)
NEUTS SEG NFR BLD: 4.1 K/UL (ref 1.3–9.1)
PLATELET # BLD AUTO: 340 K/UL (ref 150–450)
PMV BLD AUTO: 7.1 FL (ref 6–12)
RBC # BLD AUTO: 3.43 M/UL (ref 4–5.2)
WBC OTHER # BLD: 7.4 K/UL (ref 3.5–11)

## 2024-10-23 PROCEDURE — 96372 THER/PROPH/DIAG INJ SC/IM: CPT

## 2024-10-23 PROCEDURE — 6360000002 HC RX W HCPCS: Performed by: INTERNAL MEDICINE

## 2024-10-23 PROCEDURE — 36415 COLL VENOUS BLD VENIPUNCTURE: CPT

## 2024-10-23 PROCEDURE — 85025 COMPLETE CBC W/AUTO DIFF WBC: CPT

## 2024-10-23 RX ORDER — SODIUM CHLORIDE 9 MG/ML
INJECTION, SOLUTION INTRAVENOUS CONTINUOUS
OUTPATIENT
Start: 2024-10-30

## 2024-10-23 RX ORDER — ACETAMINOPHEN 325 MG/1
650 TABLET ORAL
OUTPATIENT
Start: 2024-10-30

## 2024-10-23 RX ORDER — EPINEPHRINE 1 MG/ML
0.3 INJECTION, SOLUTION, CONCENTRATE INTRAVENOUS PRN
OUTPATIENT
Start: 2024-10-30

## 2024-10-23 RX ORDER — ONDANSETRON 2 MG/ML
8 INJECTION INTRAMUSCULAR; INTRAVENOUS
OUTPATIENT
Start: 2024-10-30

## 2024-10-23 RX ORDER — DIPHENHYDRAMINE HYDROCHLORIDE 50 MG/ML
50 INJECTION INTRAMUSCULAR; INTRAVENOUS
OUTPATIENT
Start: 2024-10-30

## 2024-10-23 RX ORDER — ALBUTEROL SULFATE 90 UG/1
4 INHALANT RESPIRATORY (INHALATION) PRN
OUTPATIENT
Start: 2024-10-30

## 2024-10-23 RX ADMIN — DARBEPOETIN ALFA 25 MCG: 25 INJECTION, SOLUTION INTRAVENOUS; SUBCUTANEOUS at 13:33

## 2024-10-23 NOTE — PROGRESS NOTES
Pt arrived for aranesp injection.  Vitals obtained and labs drawn.  Hgb=9.6, therefore injection indicated per written parameters.  Injection given in L arm.   Pt tolerated well.  No s/s adverse reaction noted.  Pt discharged home, ambulatory per self.

## 2024-10-30 ENCOUNTER — HOSPITAL ENCOUNTER (OUTPATIENT)
Dept: INFUSION THERAPY | Age: 39
Setting detail: INFUSION SERIES
Discharge: HOME | End: 2024-10-30
Payer: MEDICARE

## 2024-10-30 VITALS
OXYGEN SATURATION: 99 % | RESPIRATION RATE: 17 BRPM | DIASTOLIC BLOOD PRESSURE: 91 MMHG | TEMPERATURE: 97 F | HEART RATE: 89 BPM | SYSTOLIC BLOOD PRESSURE: 156 MMHG

## 2024-10-30 DIAGNOSIS — I12.9 RENAL HYPERTENSION: ICD-10-CM

## 2024-10-30 DIAGNOSIS — D63.1 ANEMIA IN END-STAGE RENAL DISEASE (HCC): ICD-10-CM

## 2024-10-30 DIAGNOSIS — N18.4 CKD (CHRONIC KIDNEY DISEASE), STAGE IV (HCC): ICD-10-CM

## 2024-10-30 DIAGNOSIS — E83.51 HYPOCALCEMIA: Primary | ICD-10-CM

## 2024-10-30 DIAGNOSIS — E13.22 SECONDARY DIABETES MELLITUS WITH CHRONIC KIDNEY DISEASE (HCC): ICD-10-CM

## 2024-10-30 DIAGNOSIS — N18.6 ANEMIA IN END-STAGE RENAL DISEASE (HCC): ICD-10-CM

## 2024-10-30 LAB
BASOPHILS # BLD: 0.1 K/UL (ref 0–0.2)
BASOPHILS NFR BLD: 1 % (ref 0–2)
EOSINOPHIL # BLD: 0.1 K/UL (ref 0–0.4)
EOSINOPHILS RELATIVE PERCENT: 2 % (ref 0–4)
ERYTHROCYTE [DISTWIDTH] IN BLOOD BY AUTOMATED COUNT: 13.4 % (ref 11.5–14.9)
HCT VFR BLD AUTO: 27.8 % (ref 36–46)
HGB BLD-MCNC: 9.4 G/DL (ref 12–16)
LYMPHOCYTES NFR BLD: 2.7 K/UL (ref 1–4.8)
LYMPHOCYTES RELATIVE PERCENT: 34 % (ref 24–44)
MCH RBC QN AUTO: 28.1 PG (ref 26–34)
MCHC RBC AUTO-ENTMCNC: 33.8 G/DL (ref 31–37)
MCV RBC AUTO: 83.1 FL (ref 80–100)
MONOCYTES NFR BLD: 0.4 K/UL (ref 0.1–1.3)
MONOCYTES NFR BLD: 6 % (ref 1–7)
NEUTROPHILS NFR BLD: 57 % (ref 36–66)
NEUTS SEG NFR BLD: 4.6 K/UL (ref 1.3–9.1)
PLATELET # BLD AUTO: 303 K/UL (ref 150–450)
PMV BLD AUTO: 7.1 FL (ref 6–12)
RBC # BLD AUTO: 3.35 M/UL (ref 4–5.2)
WBC OTHER # BLD: 7.9 K/UL (ref 3.5–11)

## 2024-10-30 PROCEDURE — 85025 COMPLETE CBC W/AUTO DIFF WBC: CPT

## 2024-10-30 PROCEDURE — 6360000002 HC RX W HCPCS: Performed by: INTERNAL MEDICINE

## 2024-10-30 PROCEDURE — 36415 COLL VENOUS BLD VENIPUNCTURE: CPT

## 2024-10-30 PROCEDURE — 96372 THER/PROPH/DIAG INJ SC/IM: CPT

## 2024-10-30 RX ORDER — ONDANSETRON 2 MG/ML
8 INJECTION INTRAMUSCULAR; INTRAVENOUS
OUTPATIENT
Start: 2024-11-06

## 2024-10-30 RX ORDER — EPINEPHRINE 1 MG/ML
0.3 INJECTION, SOLUTION, CONCENTRATE INTRAVENOUS PRN
OUTPATIENT
Start: 2024-11-06

## 2024-10-30 RX ORDER — SODIUM CHLORIDE 9 MG/ML
INJECTION, SOLUTION INTRAVENOUS CONTINUOUS
OUTPATIENT
Start: 2024-11-06

## 2024-10-30 RX ORDER — ACETAMINOPHEN 325 MG/1
650 TABLET ORAL
OUTPATIENT
Start: 2024-11-06

## 2024-10-30 RX ORDER — DIPHENHYDRAMINE HYDROCHLORIDE 50 MG/ML
50 INJECTION INTRAMUSCULAR; INTRAVENOUS
OUTPATIENT
Start: 2024-11-06

## 2024-10-30 RX ORDER — ALBUTEROL SULFATE 90 UG/1
4 INHALANT RESPIRATORY (INHALATION) PRN
OUTPATIENT
Start: 2024-11-06

## 2024-10-30 RX ADMIN — DARBEPOETIN ALFA 25 MCG: 25 INJECTION, SOLUTION INTRAVENOUS; SUBCUTANEOUS at 13:43

## 2024-10-30 NOTE — PROGRESS NOTES
Pt arrived for Aranesp injection.  Vitals obtained and labs drawn.  Hgb=9.4, therefore injection indicated per written parameters.  Injection given in R arm.   Pt tolerated well.  No s/s adverse reaction noted.  Pt discharged home, ambulatory per self.

## 2024-11-06 ENCOUNTER — HOSPITAL ENCOUNTER (OUTPATIENT)
Dept: INFUSION THERAPY | Age: 39
Setting detail: INFUSION SERIES
Discharge: HOME | End: 2024-11-06
Payer: MEDICARE

## 2024-11-06 VITALS
HEART RATE: 96 BPM | TEMPERATURE: 97.2 F | SYSTOLIC BLOOD PRESSURE: 147 MMHG | DIASTOLIC BLOOD PRESSURE: 79 MMHG | RESPIRATION RATE: 17 BRPM | OXYGEN SATURATION: 99 %

## 2024-11-06 DIAGNOSIS — D63.1 ANEMIA IN END-STAGE RENAL DISEASE (HCC): ICD-10-CM

## 2024-11-06 DIAGNOSIS — N18.6 ANEMIA IN END-STAGE RENAL DISEASE (HCC): ICD-10-CM

## 2024-11-06 DIAGNOSIS — I12.9 RENAL HYPERTENSION: ICD-10-CM

## 2024-11-06 DIAGNOSIS — N18.4 CKD (CHRONIC KIDNEY DISEASE), STAGE IV (HCC): ICD-10-CM

## 2024-11-06 DIAGNOSIS — E13.22 SECONDARY DIABETES MELLITUS WITH CHRONIC KIDNEY DISEASE (HCC): ICD-10-CM

## 2024-11-06 DIAGNOSIS — E83.51 HYPOCALCEMIA: Primary | ICD-10-CM

## 2024-11-06 LAB
BASOPHILS # BLD: 0 K/UL (ref 0–0.2)
BASOPHILS NFR BLD: 1 % (ref 0–2)
EOSINOPHIL # BLD: 0.1 K/UL (ref 0–0.4)
EOSINOPHILS RELATIVE PERCENT: 1 % (ref 0–4)
ERYTHROCYTE [DISTWIDTH] IN BLOOD BY AUTOMATED COUNT: 14 % (ref 11.5–14.9)
HCT VFR BLD AUTO: 26.6 % (ref 36–46)
HGB BLD-MCNC: 9 G/DL (ref 12–16)
LYMPHOCYTES NFR BLD: 2.2 K/UL (ref 1–4.8)
LYMPHOCYTES RELATIVE PERCENT: 26 % (ref 24–44)
MCH RBC QN AUTO: 28.1 PG (ref 26–34)
MCHC RBC AUTO-ENTMCNC: 33.9 G/DL (ref 31–37)
MCV RBC AUTO: 82.9 FL (ref 80–100)
MONOCYTES NFR BLD: 0.4 K/UL (ref 0.1–1.3)
MONOCYTES NFR BLD: 4 % (ref 1–7)
NEUTROPHILS NFR BLD: 68 % (ref 36–66)
NEUTS SEG NFR BLD: 5.7 K/UL (ref 1.3–9.1)
PLATELET # BLD AUTO: 259 K/UL (ref 150–450)
PMV BLD AUTO: 7.6 FL (ref 6–12)
RBC # BLD AUTO: 3.21 M/UL (ref 4–5.2)
WBC OTHER # BLD: 8.4 K/UL (ref 3.5–11)

## 2024-11-06 PROCEDURE — 36415 COLL VENOUS BLD VENIPUNCTURE: CPT

## 2024-11-06 PROCEDURE — 6360000002 HC RX W HCPCS: Performed by: INTERNAL MEDICINE

## 2024-11-06 PROCEDURE — 85025 COMPLETE CBC W/AUTO DIFF WBC: CPT

## 2024-11-06 PROCEDURE — 96372 THER/PROPH/DIAG INJ SC/IM: CPT

## 2024-11-06 RX ORDER — SODIUM CHLORIDE 9 MG/ML
INJECTION, SOLUTION INTRAVENOUS CONTINUOUS
OUTPATIENT
Start: 2024-11-13

## 2024-11-06 RX ORDER — ACETAMINOPHEN 325 MG/1
650 TABLET ORAL
OUTPATIENT
Start: 2024-11-13

## 2024-11-06 RX ORDER — ONDANSETRON 2 MG/ML
8 INJECTION INTRAMUSCULAR; INTRAVENOUS
OUTPATIENT
Start: 2024-11-13

## 2024-11-06 RX ORDER — DIPHENHYDRAMINE HYDROCHLORIDE 50 MG/ML
50 INJECTION INTRAMUSCULAR; INTRAVENOUS
OUTPATIENT
Start: 2024-11-13

## 2024-11-06 RX ORDER — ALBUTEROL SULFATE 90 UG/1
4 INHALANT RESPIRATORY (INHALATION) PRN
OUTPATIENT
Start: 2024-11-13

## 2024-11-06 RX ORDER — EPINEPHRINE 1 MG/ML
0.3 INJECTION, SOLUTION, CONCENTRATE INTRAVENOUS PRN
OUTPATIENT
Start: 2024-11-13

## 2024-11-06 RX ADMIN — DARBEPOETIN ALFA 25 MCG: 25 INJECTION, SOLUTION INTRAVENOUS; SUBCUTANEOUS at 14:05

## 2024-11-06 NOTE — PROGRESS NOTES
Pt arrived for aranesp injection.  Vitals obtained and labs drawn.  Hgb=9.0, therefore injection indicated per written parameters.  Injection given in R arm.   Pt tolerated well.  No s/s adverse reaction noted.  Pt discharged home, ambulatory per self.

## 2024-11-13 ENCOUNTER — HOSPITAL ENCOUNTER (OUTPATIENT)
Dept: INFUSION THERAPY | Age: 39
Setting detail: INFUSION SERIES
Discharge: HOME | End: 2024-11-13
Payer: MEDICARE

## 2024-11-13 ENCOUNTER — PATIENT MESSAGE (OUTPATIENT)
Dept: PRIMARY CARE CLINIC | Age: 39
End: 2024-11-13

## 2024-11-13 VITALS
RESPIRATION RATE: 18 BRPM | DIASTOLIC BLOOD PRESSURE: 95 MMHG | OXYGEN SATURATION: 100 % | TEMPERATURE: 96.4 F | HEART RATE: 89 BPM | SYSTOLIC BLOOD PRESSURE: 177 MMHG

## 2024-11-13 DIAGNOSIS — E10.22 TYPE 1 DIABETES MELLITUS WITH STAGE 3A CHRONIC KIDNEY DISEASE (HCC): ICD-10-CM

## 2024-11-13 DIAGNOSIS — E83.51 HYPOCALCEMIA: Primary | ICD-10-CM

## 2024-11-13 DIAGNOSIS — N18.6 ANEMIA IN END-STAGE RENAL DISEASE (HCC): ICD-10-CM

## 2024-11-13 DIAGNOSIS — D63.1 ANEMIA IN END-STAGE RENAL DISEASE (HCC): ICD-10-CM

## 2024-11-13 DIAGNOSIS — E10.22 TYPE 1 DIABETES MELLITUS WITH STAGE 3A CHRONIC KIDNEY DISEASE (HCC): Primary | ICD-10-CM

## 2024-11-13 DIAGNOSIS — N18.31 TYPE 1 DIABETES MELLITUS WITH STAGE 3A CHRONIC KIDNEY DISEASE (HCC): Primary | ICD-10-CM

## 2024-11-13 DIAGNOSIS — I12.9 RENAL HYPERTENSION: ICD-10-CM

## 2024-11-13 DIAGNOSIS — N18.31 TYPE 1 DIABETES MELLITUS WITH STAGE 3A CHRONIC KIDNEY DISEASE (HCC): ICD-10-CM

## 2024-11-13 DIAGNOSIS — E13.22 SECONDARY DIABETES MELLITUS WITH CHRONIC KIDNEY DISEASE (HCC): ICD-10-CM

## 2024-11-13 DIAGNOSIS — N18.4 CKD (CHRONIC KIDNEY DISEASE), STAGE IV (HCC): ICD-10-CM

## 2024-11-13 LAB
BASOPHILS # BLD: 0.1 K/UL (ref 0–0.2)
BASOPHILS NFR BLD: 1 % (ref 0–2)
EOSINOPHIL # BLD: 0.1 K/UL (ref 0–0.4)
EOSINOPHILS RELATIVE PERCENT: 1 % (ref 0–4)
ERYTHROCYTE [DISTWIDTH] IN BLOOD BY AUTOMATED COUNT: 14 % (ref 11.5–14.9)
HCT VFR BLD AUTO: 27.6 % (ref 36–46)
HGB BLD-MCNC: 9.2 G/DL (ref 12–16)
LYMPHOCYTES NFR BLD: 2.1 K/UL (ref 1–4.8)
LYMPHOCYTES RELATIVE PERCENT: 27 % (ref 24–44)
MCH RBC QN AUTO: 27.7 PG (ref 26–34)
MCHC RBC AUTO-ENTMCNC: 33.3 G/DL (ref 31–37)
MCV RBC AUTO: 83.1 FL (ref 80–100)
MONOCYTES NFR BLD: 0.4 K/UL (ref 0.1–1.3)
MONOCYTES NFR BLD: 6 % (ref 1–7)
NEUTROPHILS NFR BLD: 65 % (ref 36–66)
NEUTS SEG NFR BLD: 5.2 K/UL (ref 1.3–9.1)
PLATELET # BLD AUTO: 291 K/UL (ref 150–450)
PMV BLD AUTO: 7.2 FL (ref 6–12)
RBC # BLD AUTO: 3.32 M/UL (ref 4–5.2)
WBC OTHER # BLD: 7.8 K/UL (ref 3.5–11)

## 2024-11-13 PROCEDURE — 6360000002 HC RX W HCPCS: Performed by: INTERNAL MEDICINE

## 2024-11-13 PROCEDURE — 96372 THER/PROPH/DIAG INJ SC/IM: CPT

## 2024-11-13 PROCEDURE — 36415 COLL VENOUS BLD VENIPUNCTURE: CPT

## 2024-11-13 PROCEDURE — 85025 COMPLETE CBC W/AUTO DIFF WBC: CPT

## 2024-11-13 RX ORDER — DIPHENHYDRAMINE HYDROCHLORIDE 50 MG/ML
50 INJECTION INTRAMUSCULAR; INTRAVENOUS
OUTPATIENT
Start: 2024-11-20

## 2024-11-13 RX ORDER — ALBUTEROL SULFATE 90 UG/1
4 INHALANT RESPIRATORY (INHALATION) PRN
OUTPATIENT
Start: 2024-11-20

## 2024-11-13 RX ORDER — INSULIN ASPART 100 [IU]/ML
INJECTION, SOLUTION INTRAVENOUS; SUBCUTANEOUS
Qty: 10 ML | Refills: 3 | Status: SHIPPED | OUTPATIENT
Start: 2024-11-13

## 2024-11-13 RX ORDER — INSULIN ASPART INJECTION 100 [IU]/ML
INJECTION, SOLUTION SUBCUTANEOUS
Refills: 0 | OUTPATIENT
Start: 2024-11-13

## 2024-11-13 RX ORDER — ONDANSETRON 2 MG/ML
8 INJECTION INTRAMUSCULAR; INTRAVENOUS
OUTPATIENT
Start: 2024-11-20

## 2024-11-13 RX ORDER — SODIUM CHLORIDE 9 MG/ML
INJECTION, SOLUTION INTRAVENOUS CONTINUOUS
OUTPATIENT
Start: 2024-11-20

## 2024-11-13 RX ORDER — ACETAMINOPHEN 325 MG/1
650 TABLET ORAL
OUTPATIENT
Start: 2024-11-20

## 2024-11-13 RX ORDER — EPINEPHRINE 1 MG/ML
0.3 INJECTION, SOLUTION, CONCENTRATE INTRAVENOUS PRN
OUTPATIENT
Start: 2024-11-20

## 2024-11-13 RX ADMIN — DARBEPOETIN ALFA 25 MCG: 25 INJECTION, SOLUTION INTRAVENOUS; SUBCUTANEOUS at 13:49

## 2024-11-13 NOTE — PROGRESS NOTES
Pt arrived for aranesp injection.  Vitals obtained and labs drawn.  Hgb=9.2  therefore injection indicated per written parameters.  Injection given in R arm.   Pt tolerated well.  No s/s adverse reaction noted.  Pt discharged home, ambulatory per self.

## 2024-11-19 ENCOUNTER — PATIENT MESSAGE (OUTPATIENT)
Dept: PRIMARY CARE CLINIC | Age: 39
End: 2024-11-19

## 2024-11-20 ENCOUNTER — HOSPITAL ENCOUNTER (OUTPATIENT)
Dept: INFUSION THERAPY | Age: 39
Setting detail: INFUSION SERIES
Discharge: HOME | End: 2024-11-20
Payer: MEDICARE

## 2024-11-20 VITALS
DIASTOLIC BLOOD PRESSURE: 88 MMHG | SYSTOLIC BLOOD PRESSURE: 158 MMHG | OXYGEN SATURATION: 100 % | RESPIRATION RATE: 16 BRPM | TEMPERATURE: 96.7 F | HEART RATE: 86 BPM

## 2024-11-20 DIAGNOSIS — N18.4 CKD (CHRONIC KIDNEY DISEASE), STAGE IV (HCC): ICD-10-CM

## 2024-11-20 DIAGNOSIS — E83.51 HYPOCALCEMIA: ICD-10-CM

## 2024-11-20 DIAGNOSIS — E13.22 SECONDARY DIABETES MELLITUS WITH CHRONIC KIDNEY DISEASE (HCC): ICD-10-CM

## 2024-11-20 DIAGNOSIS — D63.1 ANEMIA IN END-STAGE RENAL DISEASE (HCC): ICD-10-CM

## 2024-11-20 DIAGNOSIS — N18.6 ANEMIA IN END-STAGE RENAL DISEASE (HCC): ICD-10-CM

## 2024-11-20 DIAGNOSIS — I12.9 RENAL HYPERTENSION: Primary | ICD-10-CM

## 2024-11-20 LAB
BASOPHILS # BLD: 0 K/UL (ref 0–0.2)
BASOPHILS NFR BLD: 1 % (ref 0–2)
EOSINOPHIL # BLD: 0.1 K/UL (ref 0–0.4)
EOSINOPHILS RELATIVE PERCENT: 1 % (ref 0–4)
ERYTHROCYTE [DISTWIDTH] IN BLOOD BY AUTOMATED COUNT: 13.8 % (ref 11.5–14.9)
HCT VFR BLD AUTO: 28.7 % (ref 36–46)
HGB BLD-MCNC: 9.5 G/DL (ref 12–16)
LYMPHOCYTES NFR BLD: 2.1 K/UL (ref 1–4.8)
LYMPHOCYTES RELATIVE PERCENT: 28 % (ref 24–44)
MCH RBC QN AUTO: 27.7 PG (ref 26–34)
MCHC RBC AUTO-ENTMCNC: 33 G/DL (ref 31–37)
MCV RBC AUTO: 83.9 FL (ref 80–100)
MONOCYTES NFR BLD: 0.4 K/UL (ref 0.1–1.3)
MONOCYTES NFR BLD: 6 % (ref 1–7)
NEUTROPHILS NFR BLD: 64 % (ref 36–66)
NEUTS SEG NFR BLD: 4.8 K/UL (ref 1.3–9.1)
PLATELET # BLD AUTO: 307 K/UL (ref 150–450)
PMV BLD AUTO: 7.3 FL (ref 6–12)
RBC # BLD AUTO: 3.43 M/UL (ref 4–5.2)
WBC OTHER # BLD: 7.5 K/UL (ref 3.5–11)

## 2024-11-20 PROCEDURE — 85025 COMPLETE CBC W/AUTO DIFF WBC: CPT

## 2024-11-20 PROCEDURE — 96372 THER/PROPH/DIAG INJ SC/IM: CPT

## 2024-11-20 PROCEDURE — 36415 COLL VENOUS BLD VENIPUNCTURE: CPT

## 2024-11-20 PROCEDURE — 6360000002 HC RX W HCPCS: Performed by: INTERNAL MEDICINE

## 2024-11-20 RX ORDER — ACETAMINOPHEN 325 MG/1
650 TABLET ORAL
OUTPATIENT
Start: 2024-11-27

## 2024-11-20 RX ORDER — ONDANSETRON 2 MG/ML
8 INJECTION INTRAMUSCULAR; INTRAVENOUS
OUTPATIENT
Start: 2024-11-27

## 2024-11-20 RX ORDER — ALBUTEROL SULFATE 90 UG/1
4 INHALANT RESPIRATORY (INHALATION) PRN
OUTPATIENT
Start: 2024-11-27

## 2024-11-20 RX ORDER — HYDROCORTISONE SODIUM SUCCINATE 100 MG/2ML
100 INJECTION INTRAMUSCULAR; INTRAVENOUS
OUTPATIENT
Start: 2024-11-27

## 2024-11-20 RX ORDER — EPINEPHRINE 1 MG/ML
0.3 INJECTION, SOLUTION, CONCENTRATE INTRAVENOUS PRN
OUTPATIENT
Start: 2024-11-27

## 2024-11-20 RX ORDER — DIPHENHYDRAMINE HYDROCHLORIDE 50 MG/ML
50 INJECTION INTRAMUSCULAR; INTRAVENOUS
OUTPATIENT
Start: 2024-11-27

## 2024-11-20 RX ORDER — SODIUM CHLORIDE 9 MG/ML
INJECTION, SOLUTION INTRAVENOUS CONTINUOUS
OUTPATIENT
Start: 2024-11-27

## 2024-11-20 RX ADMIN — DARBEPOETIN ALFA 25 MCG: 25 INJECTION, SOLUTION INTRAVENOUS; SUBCUTANEOUS at 14:31

## 2024-11-20 NOTE — PROGRESS NOTES
Pt arrived for Aranesp injection.  Vitals obtained and labs drawn.  Hgb=9.5, therefore injection indicated per written parameters.  Injection given in R arm.   Pt tolerated well.  No s/s adverse reaction noted.  Pt discharged home, ambulatory per self.

## 2024-11-27 ENCOUNTER — HOSPITAL ENCOUNTER (OUTPATIENT)
Dept: INFUSION THERAPY | Age: 39
Setting detail: INFUSION SERIES
Discharge: HOME | End: 2024-11-27
Payer: MEDICARE

## 2024-11-27 VITALS
TEMPERATURE: 96.8 F | HEART RATE: 87 BPM | OXYGEN SATURATION: 99 % | RESPIRATION RATE: 17 BRPM | SYSTOLIC BLOOD PRESSURE: 148 MMHG | DIASTOLIC BLOOD PRESSURE: 88 MMHG

## 2024-11-27 DIAGNOSIS — I12.9 RENAL HYPERTENSION: ICD-10-CM

## 2024-11-27 DIAGNOSIS — E13.22 SECONDARY DIABETES MELLITUS WITH CHRONIC KIDNEY DISEASE (HCC): ICD-10-CM

## 2024-11-27 DIAGNOSIS — N18.4 CKD (CHRONIC KIDNEY DISEASE), STAGE IV (HCC): ICD-10-CM

## 2024-11-27 DIAGNOSIS — E83.51 HYPOCALCEMIA: Primary | ICD-10-CM

## 2024-11-27 DIAGNOSIS — N18.6 ANEMIA IN END-STAGE RENAL DISEASE (HCC): ICD-10-CM

## 2024-11-27 DIAGNOSIS — D63.1 ANEMIA IN END-STAGE RENAL DISEASE (HCC): ICD-10-CM

## 2024-11-27 LAB
BASOPHILS # BLD: 0.1 K/UL (ref 0–0.2)
BASOPHILS NFR BLD: 1 % (ref 0–2)
EOSINOPHIL # BLD: 0.1 K/UL (ref 0–0.4)
EOSINOPHILS RELATIVE PERCENT: 2 % (ref 0–4)
ERYTHROCYTE [DISTWIDTH] IN BLOOD BY AUTOMATED COUNT: 13.9 % (ref 11.5–14.9)
HCT VFR BLD AUTO: 28.2 % (ref 36–46)
HGB BLD-MCNC: 9.3 G/DL (ref 12–16)
LYMPHOCYTES NFR BLD: 2.3 K/UL (ref 1–4.8)
LYMPHOCYTES RELATIVE PERCENT: 30 % (ref 24–44)
MCH RBC QN AUTO: 27.5 PG (ref 26–34)
MCHC RBC AUTO-ENTMCNC: 32.8 G/DL (ref 31–37)
MCV RBC AUTO: 83.7 FL (ref 80–100)
MONOCYTES NFR BLD: 0.4 K/UL (ref 0.1–1.3)
MONOCYTES NFR BLD: 5 % (ref 1–7)
NEUTROPHILS NFR BLD: 62 % (ref 36–66)
NEUTS SEG NFR BLD: 4.9 K/UL (ref 1.3–9.1)
PLATELET # BLD AUTO: 313 K/UL (ref 150–450)
PMV BLD AUTO: 7.4 FL (ref 6–12)
RBC # BLD AUTO: 3.37 M/UL (ref 4–5.2)
WBC OTHER # BLD: 7.7 K/UL (ref 3.5–11)

## 2024-11-27 PROCEDURE — 36415 COLL VENOUS BLD VENIPUNCTURE: CPT

## 2024-11-27 PROCEDURE — 85025 COMPLETE CBC W/AUTO DIFF WBC: CPT

## 2024-11-27 PROCEDURE — 96372 THER/PROPH/DIAG INJ SC/IM: CPT

## 2024-11-27 PROCEDURE — 6360000002 HC RX W HCPCS: Performed by: INTERNAL MEDICINE

## 2024-11-27 RX ORDER — SODIUM CHLORIDE 9 MG/ML
INJECTION, SOLUTION INTRAVENOUS CONTINUOUS
OUTPATIENT
Start: 2024-12-04

## 2024-11-27 RX ORDER — ACETAMINOPHEN 325 MG/1
650 TABLET ORAL
OUTPATIENT
Start: 2024-12-04

## 2024-11-27 RX ORDER — DIPHENHYDRAMINE HYDROCHLORIDE 50 MG/ML
50 INJECTION INTRAMUSCULAR; INTRAVENOUS
OUTPATIENT
Start: 2024-12-04

## 2024-11-27 RX ORDER — ALBUTEROL SULFATE 90 UG/1
4 INHALANT RESPIRATORY (INHALATION) PRN
OUTPATIENT
Start: 2024-12-04

## 2024-11-27 RX ORDER — EPINEPHRINE 1 MG/ML
0.3 INJECTION, SOLUTION, CONCENTRATE INTRAVENOUS PRN
OUTPATIENT
Start: 2024-12-04

## 2024-11-27 RX ORDER — HYDROCORTISONE SODIUM SUCCINATE 100 MG/2ML
100 INJECTION INTRAMUSCULAR; INTRAVENOUS
OUTPATIENT
Start: 2024-12-04

## 2024-11-27 RX ORDER — ONDANSETRON 2 MG/ML
8 INJECTION INTRAMUSCULAR; INTRAVENOUS
OUTPATIENT
Start: 2024-12-04

## 2024-11-27 RX ADMIN — DARBEPOETIN ALFA 25 MCG: 25 INJECTION, SOLUTION INTRAVENOUS; SUBCUTANEOUS at 13:55

## 2024-11-27 NOTE — PROGRESS NOTES
Pt arrived for aranesp injection.  Vitals obtained and labs drawn.  Hgb=9.3, therefore injection indicated per written parameters.  Injection given in L arm.   Pt tolerated well.  No s/s adverse reaction noted.  Pt discharged home, ambulatory per self.

## (undated) DEVICE — SYRINGE MED 10ML LUERLOCK TIP W/O SFTY DISP

## (undated) DEVICE — NEEDLE SCLERO L4MM L200CM OD25GA ODSEC18MM 038IN LOK CLR HUB

## (undated) DEVICE — DRESSING TRNSPAR W5XL4.5IN FLM SHT SEMIPERMEABLE WIND

## (undated) DEVICE — COVER,LIGHT HANDLE,FLX,2/PK: Brand: MEDLINE INDUSTRIES, INC.

## (undated) DEVICE — SUTURE VICRYL + SZ 4-0 L27IN ABSRB UD L26MM SH 1/2 CIR VCP415H

## (undated) DEVICE — ELECTRODE PT RET AD L9FT HI MOIST COND ADH HYDRGEL CORDED

## (undated) DEVICE — CLIP RESOLUTION 360

## (undated) DEVICE — STRAP,POSITIONING,KNEE/BODY,FOAM,4X60": Brand: MEDLINE

## (undated) DEVICE — SINGLE-USE POLYPECTOMY SNARE: Brand: CAPTIVATOR

## (undated) DEVICE — CONTAINER,SPECIMEN,4OZ,OR STRL: Brand: MEDLINE

## (undated) DEVICE — TRAP SPEC RETRV CLR PLAS POLYP IN LN SUCT QUIK CTCH

## (undated) DEVICE — BLADE OPHTH D5MM 15DEG GRN W/ RND KNURLED HNDL MICRO-SHARP

## (undated) DEVICE — FORCEPS BX L240CM WRK CHN 2.8MM STD CAP W/ NDL MIC MESH

## (undated) DEVICE — Device

## (undated) DEVICE — SUTURE NONABSORBABLE MONOFILAMENT 7-0 BV-1 1X24 IN PROLENE 8702H

## (undated) DEVICE — GLOVE SURG SZ 7 CRM LTX FREE POLYISOPRENE POLYMER BEAD ANTI

## (undated) DEVICE — SHEET, T, LAPAROTOMY, STERILE: Brand: MEDLINE

## (undated) DEVICE — SUTURE PERMA-HAND SZ 2-0 L30IN NONABSORBABLE BLK L26MM SH K833H

## (undated) DEVICE — GAUZE,SPONGE,FLUFF,6"X6.75",STRL,5/TRAY: Brand: MEDLINE

## (undated) DEVICE — GLOVE SURG SZ 7.5 L11.73IN FNGR THK9.8MIL STRW LTX POLYMER

## (undated) DEVICE — SUTURE N ABSRB L 18 IN SZ 4-0 NDL L 19 MM NYL MONOFILAMENT

## (undated) DEVICE — EVERGRIP INSERT SET: Brand: FOGARTY EVERGRIP

## (undated) DEVICE — DRAPE,REIN 53X77,STERILE: Brand: MEDLINE

## (undated) DEVICE — GLOVE SURG SZ 75 L12IN FNGR THK79MIL GRN LTX FREE

## (undated) DEVICE — STRAP ARMBRD W1.5XL32IN FOAM STR YET SFT W/ HK AND LOOP

## (undated) DEVICE — SUTURE NONABSORBABLE MONOFILAMENT 6-0 C-1 1X30 IN PROLENE 8706H

## (undated) DEVICE — BLADE,CARBON-STEEL,15,STRL,DISPOSABLE,TB: Brand: MEDLINE

## (undated) DEVICE — PAD N ADH W3XL4IN POLY COT SFT PERF FLM EASILY CUT ABSRB

## (undated) DEVICE — TUBING SUCT 12FR MAL ALUM SHFT FN CAP VENT UNIV CONN W/ OBT

## (undated) DEVICE — SUTURE PERMAHAND SZ 4-0 L18IN NONABSORBABLE BLK SILK BRAID A183H